# Patient Record
Sex: FEMALE | Race: WHITE | NOT HISPANIC OR LATINO | Employment: OTHER | URBAN - METROPOLITAN AREA
[De-identification: names, ages, dates, MRNs, and addresses within clinical notes are randomized per-mention and may not be internally consistent; named-entity substitution may affect disease eponyms.]

---

## 2017-01-05 RX ORDER — FLUTICASONE PROPIONATE 50 MCG
SPRAY, SUSPENSION (ML) NASAL
Qty: 16 ML | Refills: 0 | Status: SHIPPED | OUTPATIENT
Start: 2017-01-05 | End: 2017-04-07 | Stop reason: SDUPTHER

## 2017-01-15 DIAGNOSIS — I10 HYPERTENSION, BENIGN: ICD-10-CM

## 2017-01-16 RX ORDER — LOSARTAN POTASSIUM 100 MG/1
TABLET ORAL
Qty: 90 TABLET | Refills: 1 | Status: SHIPPED | OUTPATIENT
Start: 2017-01-16 | End: 2017-08-02 | Stop reason: SDUPTHER

## 2017-01-26 DIAGNOSIS — E03.9 HYPOTHYROIDISM (ACQUIRED): ICD-10-CM

## 2017-01-26 RX ORDER — LEVOTHYROXINE SODIUM 0.12 MG/1
TABLET ORAL
Qty: 90 TABLET | Refills: 0 | Status: SHIPPED | OUTPATIENT
Start: 2017-01-26 | End: 2017-05-16 | Stop reason: SDUPTHER

## 2017-02-14 DIAGNOSIS — I10 ESSENTIAL HYPERTENSION: ICD-10-CM

## 2017-02-14 RX ORDER — ATENOLOL AND CHLORTHALIDONE TABLET 50; 25 MG/1; MG/1
TABLET ORAL
Qty: 90 TABLET | Refills: 1 | Status: SHIPPED | OUTPATIENT
Start: 2017-02-14 | End: 2017-08-30 | Stop reason: SDUPTHER

## 2017-04-10 RX ORDER — FLUTICASONE PROPIONATE 50 MCG
SPRAY, SUSPENSION (ML) NASAL
Qty: 16 ML | Refills: 2 | Status: SHIPPED | OUTPATIENT
Start: 2017-04-10 | End: 2018-11-01 | Stop reason: SDUPTHER

## 2017-05-02 ENCOUNTER — OFFICE VISIT (OUTPATIENT)
Dept: INTERNAL MEDICINE | Facility: CLINIC | Age: 82
End: 2017-05-02

## 2017-05-02 VITALS
WEIGHT: 149.4 LBS | SYSTOLIC BLOOD PRESSURE: 142 MMHG | BODY MASS INDEX: 27.49 KG/M2 | HEIGHT: 62 IN | DIASTOLIC BLOOD PRESSURE: 84 MMHG

## 2017-05-02 DIAGNOSIS — I10 ESSENTIAL HYPERTENSION: Primary | ICD-10-CM

## 2017-05-02 DIAGNOSIS — E78.49 OTHER HYPERLIPIDEMIA: ICD-10-CM

## 2017-05-02 DIAGNOSIS — E03.9 ACQUIRED HYPOTHYROIDISM: ICD-10-CM

## 2017-05-02 LAB
ALBUMIN SERPL-MCNC: 4.06 G/DL (ref 3.4–4.6)
ALBUMIN/GLOB SERPL: 1.2 G/DL
ALP SERPL-CCNC: 67 U/L (ref 46–116)
ALT SERPL W P-5'-P-CCNC: 29 U/L (ref 14–59)
ANION GAP SERPL CALCULATED.3IONS-SCNC: 10 MMOL/L
AST SERPL-CCNC: 22 U/L (ref 7–37)
BASOPHILS # BLD AUTO: 0.02 10*3/MM3 (ref 0–0.2)
BASOPHILS NFR BLD AUTO: 0.3 % (ref 0–2)
BILIRUB SERPL-MCNC: 0.5 MG/DL (ref 0.2–1)
BUN BLD-MCNC: 22 MG/DL (ref 6–22)
BUN/CREAT SERPL: 32.8 (ref 7–25)
CALCIUM SPEC-SCNC: 9.2 MG/DL (ref 8.6–10.5)
CHLORIDE SERPL-SCNC: 101 MMOL/L (ref 95–107)
CHOLEST SERPL-MCNC: 226 MG/DL (ref 0–200)
CO2 SERPL-SCNC: 31 MMOL/L (ref 23–32)
CREAT BLD-MCNC: 0.67 MG/DL (ref 0.55–1.02)
DEPRECATED RDW RBC AUTO: 42.5 FL (ref 37–54)
EOSINOPHIL # BLD AUTO: 0.2 10*3/MM3 (ref 0–0.7)
EOSINOPHIL NFR BLD AUTO: 2.9 % (ref 0–5)
ERYTHROCYTE [DISTWIDTH] IN BLOOD BY AUTOMATED COUNT: 12.4 % (ref 11.5–15)
GFR SERPL CREATININE-BSD FRML MDRD: 84 ML/MIN/1.73
GLOBULIN UR ELPH-MCNC: 3.4 GM/DL
GLUCOSE BLD-MCNC: 100 MG/DL (ref 70–100)
HCT VFR BLD AUTO: 40.9 % (ref 34.1–44.9)
HDLC SERPL-MCNC: 69 MG/DL (ref 40–81)
HGB BLD-MCNC: 13.1 G/DL (ref 11.2–15.7)
LDLC SERPL CALC-MCNC: 136 MG/DL (ref 0–100)
LDLC/HDLC SERPL: 1.97 {RATIO}
LYMPHOCYTES # BLD AUTO: 1.56 10*3/MM3 (ref 0.8–7)
LYMPHOCYTES NFR BLD AUTO: 22.7 % (ref 10–60)
MCH RBC QN AUTO: 30.5 PG (ref 26–34)
MCHC RBC AUTO-ENTMCNC: 32 G/DL (ref 31–37)
MCV RBC AUTO: 95.1 FL (ref 80–100)
MONOCYTES # BLD AUTO: 0.75 10*3/MM3 (ref 0–1)
MONOCYTES NFR BLD AUTO: 10.9 % (ref 0–13)
NEUTROPHILS # BLD AUTO: 4.34 10*3/MM3 (ref 1–11)
NEUTROPHILS NFR BLD AUTO: 63.2 % (ref 30–85)
PLATELET # BLD AUTO: 256 10*3/MM3 (ref 150–450)
PMV BLD AUTO: 9.8 FL (ref 6–12)
POTASSIUM BLD-SCNC: 4.1 MMOL/L (ref 3.3–5.3)
PROT SERPL-MCNC: 7.5 G/DL (ref 6.3–8.4)
RBC # BLD AUTO: 4.3 10*6/MM3 (ref 3.93–5.22)
SODIUM BLD-SCNC: 142 MMOL/L (ref 136–145)
TRIGL SERPL-MCNC: 107 MG/DL (ref 0–150)
TSH SERPL-ACNC: 8.22 MIU/ML (ref 0.27–4.2)
VLDLC SERPL-MCNC: 21.4 MG/DL
WBC NRBC COR # BLD: 6.87 10*3/MM3 (ref 5–10)

## 2017-05-02 PROCEDURE — 99213 OFFICE O/P EST LOW 20 MIN: CPT | Performed by: INTERNAL MEDICINE

## 2017-05-02 PROCEDURE — 36415 COLL VENOUS BLD VENIPUNCTURE: CPT | Performed by: INTERNAL MEDICINE

## 2017-05-02 PROCEDURE — 85025 COMPLETE CBC W/AUTO DIFF WBC: CPT | Performed by: INTERNAL MEDICINE

## 2017-05-02 PROCEDURE — 80053 COMPREHEN METABOLIC PANEL: CPT | Performed by: INTERNAL MEDICINE

## 2017-05-02 PROCEDURE — 80061 LIPID PANEL: CPT | Performed by: INTERNAL MEDICINE

## 2017-05-16 DIAGNOSIS — E03.9 HYPOTHYROIDISM (ACQUIRED): ICD-10-CM

## 2017-05-17 RX ORDER — LEVOTHYROXINE SODIUM 0.12 MG/1
TABLET ORAL
Qty: 90 TABLET | Refills: 1 | Status: SHIPPED | OUTPATIENT
Start: 2017-05-17 | End: 2017-11-24 | Stop reason: SDUPTHER

## 2017-05-26 RX ORDER — AMLODIPINE BESYLATE 5 MG/1
TABLET ORAL
Qty: 90 TABLET | Refills: 0 | Status: SHIPPED | OUTPATIENT
Start: 2017-05-26 | End: 2017-08-30 | Stop reason: SDUPTHER

## 2017-06-16 ENCOUNTER — TELEPHONE (OUTPATIENT)
Dept: INTERNAL MEDICINE | Facility: CLINIC | Age: 82
End: 2017-06-16

## 2017-06-16 NOTE — TELEPHONE ENCOUNTER
----- Message from Emely Roman MA sent at 6/16/2017  2:32 PM EDT -----  Pt calling today and would like to have clarification in Levothyroxine directions     Pt#[phone]

## 2017-08-02 DIAGNOSIS — I10 HYPERTENSION, BENIGN: ICD-10-CM

## 2017-08-02 RX ORDER — LOSARTAN POTASSIUM 100 MG/1
TABLET ORAL
Qty: 90 TABLET | Refills: 1 | Status: SHIPPED | OUTPATIENT
Start: 2017-08-02 | End: 2018-02-19 | Stop reason: SDUPTHER

## 2017-08-30 DIAGNOSIS — I10 ESSENTIAL HYPERTENSION: ICD-10-CM

## 2017-08-30 RX ORDER — ATENOLOL AND CHLORTHALIDONE TABLET 50; 25 MG/1; MG/1
TABLET ORAL
Qty: 90 TABLET | Refills: 1 | Status: SHIPPED | OUTPATIENT
Start: 2017-08-30 | End: 2018-03-19 | Stop reason: SDUPTHER

## 2017-08-30 RX ORDER — AMLODIPINE BESYLATE 5 MG/1
TABLET ORAL
Qty: 90 TABLET | Refills: 1 | Status: SHIPPED | OUTPATIENT
Start: 2017-08-30 | End: 2018-03-19 | Stop reason: SDUPTHER

## 2017-11-08 ENCOUNTER — OFFICE VISIT (OUTPATIENT)
Dept: INTERNAL MEDICINE | Facility: CLINIC | Age: 82
End: 2017-11-08

## 2017-11-08 VITALS
HEIGHT: 62 IN | WEIGHT: 141.8 LBS | RESPIRATION RATE: 16 BRPM | SYSTOLIC BLOOD PRESSURE: 124 MMHG | TEMPERATURE: 97.3 F | BODY MASS INDEX: 26.09 KG/M2 | DIASTOLIC BLOOD PRESSURE: 80 MMHG | HEART RATE: 62 BPM | OXYGEN SATURATION: 97 %

## 2017-11-08 DIAGNOSIS — I10 ESSENTIAL HYPERTENSION: Primary | ICD-10-CM

## 2017-11-08 DIAGNOSIS — Z12.31 ENCOUNTER FOR SCREENING MAMMOGRAM FOR BREAST CANCER: ICD-10-CM

## 2017-11-08 DIAGNOSIS — J30.2 SEASONAL ALLERGIC RHINITIS, UNSPECIFIED CHRONICITY, UNSPECIFIED TRIGGER: ICD-10-CM

## 2017-11-08 DIAGNOSIS — E55.9 VITAMIN D DEFICIENCY: ICD-10-CM

## 2017-11-08 DIAGNOSIS — Z78.0 MENOPAUSE: ICD-10-CM

## 2017-11-08 DIAGNOSIS — Z86.010 HISTORY OF COLONIC POLYPS: ICD-10-CM

## 2017-11-08 DIAGNOSIS — Z00.00 MEDICARE ANNUAL WELLNESS VISIT, SUBSEQUENT: ICD-10-CM

## 2017-11-08 DIAGNOSIS — Z23 NEED FOR VACCINATION: ICD-10-CM

## 2017-11-08 DIAGNOSIS — E03.9 ACQUIRED HYPOTHYROIDISM: ICD-10-CM

## 2017-11-08 DIAGNOSIS — E78.49 OTHER HYPERLIPIDEMIA: ICD-10-CM

## 2017-11-08 LAB
ALBUMIN SERPL-MCNC: 4.6 G/DL (ref 3.5–5.2)
ALBUMIN/GLOB SERPL: 1.6 G/DL
ALP SERPL-CCNC: 65 U/L (ref 39–117)
ALT SERPL-CCNC: 16 U/L (ref 1–33)
AST SERPL-CCNC: 19 U/L (ref 1–32)
BILIRUB SERPL-MCNC: 0.5 MG/DL (ref 0.1–1.2)
BUN SERPL-MCNC: 17 MG/DL (ref 8–23)
BUN/CREAT SERPL: 25.8 (ref 7–25)
CALCIUM SERPL-MCNC: 10.3 MG/DL (ref 8.6–10.5)
CHLORIDE SERPL-SCNC: 99 MMOL/L (ref 98–107)
CHOLEST SERPL-MCNC: 205 MG/DL (ref 0–200)
CO2 SERPL-SCNC: 32.7 MMOL/L (ref 22–29)
CREAT SERPL-MCNC: 0.66 MG/DL (ref 0.57–1)
GLOBULIN SER CALC-MCNC: 2.9 GM/DL
GLUCOSE SERPL-MCNC: 108 MG/DL (ref 65–99)
HDLC SERPL-MCNC: 53 MG/DL (ref 40–60)
LDLC SERPL CALC-MCNC: 114 MG/DL (ref 0–100)
POTASSIUM SERPL-SCNC: 3.8 MMOL/L (ref 3.5–5.2)
PROT SERPL-MCNC: 7.5 G/DL (ref 6–8.5)
SODIUM SERPL-SCNC: 144 MMOL/L (ref 136–145)
TRIGL SERPL-MCNC: 189 MG/DL (ref 0–150)
TSH SERPL-ACNC: 0.62 MIU/ML (ref 0.27–4.2)
VLDLC SERPL-MCNC: 37.8 MG/DL (ref 5–40)

## 2017-11-08 PROCEDURE — 90670 PCV13 VACCINE IM: CPT | Performed by: INTERNAL MEDICINE

## 2017-11-08 PROCEDURE — 90662 IIV NO PRSV INCREASED AG IM: CPT | Performed by: INTERNAL MEDICINE

## 2017-11-08 PROCEDURE — 99213 OFFICE O/P EST LOW 20 MIN: CPT | Performed by: INTERNAL MEDICINE

## 2017-11-08 PROCEDURE — 90472 IMMUNIZATION ADMIN EACH ADD: CPT | Performed by: INTERNAL MEDICINE

## 2017-11-08 PROCEDURE — 90471 IMMUNIZATION ADMIN: CPT | Performed by: INTERNAL MEDICINE

## 2017-11-08 PROCEDURE — G0439 PPPS, SUBSEQ VISIT: HCPCS | Performed by: INTERNAL MEDICINE

## 2017-11-08 NOTE — PROGRESS NOTES
"Alanis eSverino is a 82 y.o. female here for   Chief Complaint   Patient presents with   • Subsequent Wellness   • Hyperlipidemia   • Hypertension   • Hypothyroidism   .    Vitals:    11/08/17 0810   BP: 124/80   BP Location: Left arm   Patient Position: Sitting   Cuff Size: Adult   Pulse: 62   Resp: 16   Temp: 97.3 °F (36.3 °C)   TempSrc: Temporal Artery    SpO2: 97%   Weight: 141 lb 12.8 oz (64.3 kg)   Height: 61.5\" (156.2 cm)       Body mass index is 26.36 kg/(m^2).    Hyperlipidemia   This is a chronic problem. The current episode started more than 1 year ago. The problem is controlled. Recent lipid tests were reviewed and are normal. Exacerbating diseases include hypothyroidism. She has no history of chronic renal disease, diabetes or liver disease. Pertinent negatives include no chest pain or shortness of breath.   Hypertension   This is a chronic problem. The current episode started more than 1 year ago. The problem is unchanged. The problem is controlled. Pertinent negatives include no chest pain, palpitations or shortness of breath. There is no history of chronic renal disease.   Hypothyroidism   This is a chronic problem. The current episode started more than 1 year ago. The problem occurs constantly. The problem has been unchanged. Pertinent negatives include no chest pain, chills, coughing, fatigue or fever.        The following portions of the patient's history were reviewed and updated as appropriate: allergies, current medications, past social history and problem list.    Review of Systems   Constitutional: Negative for chills, fatigue and fever.   Respiratory: Negative for cough, shortness of breath and wheezing.    Cardiovascular: Negative for chest pain, palpitations and leg swelling.   Psychiatric/Behavioral: Negative for dysphoric mood and sleep disturbance. The patient is not nervous/anxious.        Objective   Physical Exam   Constitutional: She appears well-developed and " well-nourished. No distress.   Cardiovascular: Normal rate, regular rhythm and normal heart sounds.    Pulmonary/Chest: No respiratory distress. She has no wheezes. She has no rales. She exhibits no tenderness.   Musculoskeletal: She exhibits no edema.   Psychiatric: She has a normal mood and affect. Her behavior is normal.   Nursing note and vitals reviewed.      Assessment/Plan   Diagnoses and all orders for this visit:    Essential hypertension  Comments:  controlled - call if bp over 140/90  Orders:  -     Comprehensive Metabolic Panel; Future  -     Lipid Panel; Future  -     TSH; Future  -     Urinalysis With / Microscopic If Indicated - Urine, Clean Catch; Future  -     Comprehensive Metabolic Panel  -     Lipid Panel  -     TSH  -     Urinalysis With / Microscopic If Indicated - Urine, Clean Catch    Acquired hypothyroidism  Comments:  she is taking synthroid 125 mcg daily - need rechk  Orders:  -     TSH; Future  -     TSH    Other hyperlipidemia  Comments:  need diet & exercise  Orders:  -     Comprehensive Metabolic Panel; Future  -     Lipid Panel; Future  -     Comprehensive Metabolic Panel  -     Lipid Panel    Vitamin D deficiency  Comments:  D=28 - need 2,000 units daily    Medicare annual wellness visit, subsequent    Need for vaccination  -     Flu Vaccine High Dose PF 65YR+  -     Discontinue: pneumococcal conj. 13-valent (PREVNAR-13) vaccine 0.5 mL; Inject 0.5 mL into the shoulder, thigh, or buttocks 1 (One) Time.  -     Pneumococcal Conjugate Vaccine 13-Valent All    Encounter for screening mammogram for breast cancer  -     Mammo Screening Bilateral With CAD; Future    Menopause  -     DEXA Bone Density Axial; Future    History of colonic polyps  Comments:  hyperplastic 10/2012    Seasonal allergic rhinitis, unspecified chronicity, unspecified trigger  Comments:  call if incr sx       Wellness today.     Need daily strengthening & balance exercises (shown today).   Need screening for AAA &  carotid disease.  Information given today.      I recommend zostavax - she will ask cost at pharmacy.

## 2017-11-08 NOTE — PATIENT INSTRUCTIONS
Medicare Wellness  Personal Prevention Plan of Service     Date of Office Visit:  2017  Encounter Provider:  Lluvia Liz MD  Place of Service:  [facility] INTERNAL MEDICINE  Patient Name: Kajal Severino  :  1935    As part of the Medicare Wellness portion of your visit today, we are providing you with this personalized preventive plan of services (PPPS). This plan is based upon recommendations of the [place] Preventive Services Task Force (USPSTF) and the Advisory Committee on Immunization Practices (ACIP).    This lists the preventive care services that should be considered, and provides dates of when you are due. Items listed as completed are up-to-date and do not require any further intervention.    Health Maintenance   Topic Date Due   • PNEUMOCOCCAL VACCINES (65+ LOW/MEDIUM RISK) (2 of 2 - PCV13) 2014   • MEDICARE ANNUAL WELLNESS  2016   • MAMMOGRAM  2016   • DXA SCAN  2016   • INFLUENZA VACCINE  2017   • LIPID PANEL  2018   • TDAP/TD VACCINES (2 - Td) 2023   • ZOSTER VACCINE  Completed       Orders Placed This Encounter   Procedures   • Comprehensive Metabolic Panel     Standing Status:   Future     Standing Expiration Date:   2018   • Lipid Panel     Standing Status:   Future     Standing Expiration Date:   2018   • TSH     Standing Status:   Future     Standing Expiration Date:   2018   • Urinalysis With / Microscopic If Indicated - Urine, Clean Catch     Standing Status:   Future     Standing Expiration Date:   2018       No Follow-up on file.

## 2017-11-08 NOTE — PROGRESS NOTES
QUICK REFERENCE INFORMATION:  The ABCs of the Annual Wellness Visit    Subsequent Medicare Wellness Visit    HEALTH RISK ASSESSMENT    1935    Recent Hospitalizations:  No hospitalization(s) within the last year..        Current Medical Providers:  Patient Care Team:  Lluvia Liz MD as PCP - General  Lluvia Liz MD as PCP - Family Medicine        Smoking Status:  History   Smoking Status   • Never Smoker   Smokeless Tobacco   • Never Used       Alcohol Consumption:  History   Alcohol Use No       Depression Screen:   PHQ-2/PHQ-9 Depression Screening 11/8/2017   Little interest or pleasure in doing things 0   Feeling down, depressed, or hopeless 0   Trouble falling or staying asleep, or sleeping too much 0   Feeling tired or having little energy 0   Poor appetite or overeating 0   Feeling bad about yourself - or that you are a failure or have let yourself or your family down 0   Trouble concentrating on things, such as reading the newspaper or watching television 0   Moving or speaking so slowly that other people could have noticed. Or the opposite - being so fidgety or restless that you have been moving around a lot more than usual 0   Thoughts that you would be better off dead, or of hurting yourself in some way 0   Total Score 0       Health Habits and Functional and Cognitive Screening:  Functional & Cognitive Status 11/8/2017   Do you have difficulty preparing food and eating? No   Do you have difficulty bathing yourself, getting dressed or grooming yourself? No   Do you have difficulty using the toilet? No   Do you have difficulty moving around from place to place? No   Do you have trouble with steps or getting out of a bed or a chair? No   In the past year have you fallen or experienced a near fall? No   Do you need help using the phone?  No   Are you deaf or do you have serious difficulty hearing?  No   Do you need help with transportation? Yes   Do you need help shopping? No   Do you need  help preparing meals?  No   Do you need help with housework?  No   Do you need help with laundry? No   Do you need help taking your medications? No   Do you need help managing money? No   Do you have difficulty concentrating, remembering or making decisions? No           Does the patient have evidence of cognitive impairment? No    Aspirin use counseling: Does not need AS but is currently taking (discussed benefits vs risks and patient elects to stay on ASA)      Recent Lab Results:  CMP:  Lab Results   Component Value Date    GLU 99 05/20/2016    BUN 22 05/02/2017    CREATININE 0.67 05/02/2017    EGFRIFNONA 84 05/02/2017    EGFRIFAFRI 121 05/20/2016    BCR 32.8 (H) 05/02/2017     05/02/2017    K 4.1 05/02/2017    CO2 31.0 05/02/2017    CALCIUM 9.2 05/02/2017    PROTENTOTREF 6.9 05/20/2016    ALBUMIN 4.06 05/02/2017    LABGLOBREF 2.5 05/20/2016    LABIL2 1.2 05/02/2017    BILITOT 0.5 05/02/2017    ALKPHOS 67 05/02/2017    AST 22 05/02/2017    ALT 29 05/02/2017     Lipid Panel:  Lab Results   Component Value Date    CHOL 226 (H) 05/02/2017    TRIG 107 05/02/2017    HDL 69 05/02/2017    VLDL 21.4 05/02/2017    LDLCALC 136 (H) 05/02/2017    LDLHDL 1.97 05/02/2017     HbA1c:       Visual Acuity:  No exam data present    Age-appropriate Screening Schedule:  Refer to the list below for future screening recommendations based on patient's age, sex and/or medical conditions. Orders for these recommended tests are listed in the plan section. The patient has been provided with a written plan.    Health Maintenance   Topic Date Due   • MAMMOGRAM  03/09/2016   • DXA SCAN  08/03/2016   • LIPID PANEL  11/08/2018   • TDAP/TD VACCINES (2 - Td) 03/18/2023   • INFLUENZA VACCINE  Completed   • PNEUMOCOCCAL VACCINES (65+ LOW/MEDIUM RISK)  Completed   • ZOSTER VACCINE  Completed        Subjective   History of Present Illness    Kajal Severino is a 82 y.o. female who presents for an Subsequent Wellness Visit.    The following portions  of the patient's history were reviewed and updated as appropriate: allergies, current medications, past family history, past medical history, past social history, past surgical history and problem list.    Outpatient Medications Prior to Visit   Medication Sig Dispense Refill   • amLODIPine (NORVASC) 5 MG tablet TAKE 1 TABLET BY MOUTH DAILY 90 tablet 1   • atenolol-chlorthalidone (TENORETIC) 50-25 MG per tablet TAKE 1 TABLET BY MOUTH DAILY 90 tablet 1   • fluticasone (FLONASE) 50 MCG/ACT nasal spray USE 2 SPRAYS IN EACH NOSTRIL EVERY DAY 16 mL 2   • levothyroxine (SYNTHROID, LEVOTHROID) 125 MCG tablet TAKE 1 TABLET BY MOUTH EVERY DAY EXCEPT SUNDAY 90 tablet 1   • losartan (COZAAR) 100 MG tablet TAKE 1 TABLET BY MOUTH DAILY 90 tablet 1   • montelukast (SINGULAIR) 10 MG tablet TAKE 1 TABLET BY MOUTH AT BEDTIME 30 tablet 3   • potassium chloride (K-DUR,KLOR-CON) 10 MEQ CR tablet Take 1 tablet by mouth Daily. 90 tablet 1   • levothyroxine (SYNTHROID, LEVOTHROID) 125 MCG tablet TAKE 1 TABLET BY MOUTH EVERY DAY EXCEPT SUNDAY (Patient taking differently: TAKE 1 TABLET BY MOUTH EVERY DAY) 90 tablet 0     No facility-administered medications prior to visit.        Patient Active Problem List   Diagnosis   • Hypertension   • Hyperlipidemia   • History of colonic polyps   • Hypothyroidism   • Allergic rhinitis   • Back pain, lumbosacral   • Osteoarthritis   • Vitamin D deficiency       Advance Care Planning:  Information given today - she will discuss with 3 daughters & fill out forms.    Identification of Risk Factors:  Risk factors include: increased fall risk.    Review of Systems    Compared to one year ago, the patient feels her physical health is better.  Compared to one year ago, the patient feels her mental health is better.    Objective     Physical Exam    Vitals:    11/08/17 0810   BP: 124/80   BP Location: Left arm   Patient Position: Sitting   Cuff Size: Adult   Pulse: 62   Resp: 16   Temp: 97.3 °F (36.3 °C)  "  TempSrc: Temporal Artery    SpO2: 97%   Weight: 141 lb 12.8 oz (64.3 kg)   Height: 61.5\" (156.2 cm)  Comment: Updated Height   PainSc: 0-No pain       Body mass index is 26.36 kg/(m^2).  Discussed the patient's BMI with her. The BMI is above average; BMI management plan is completed.    Assessment/Plan   Patient Self-Management and Personalized Health Advice  The patient has been provided with information about: diet, exercise, weight management, prevention of cardiac or vascular disease, the relationship between weight and GERD, fall prevention and designing advance directives and preventive services including:   · Advance directive, Bone densitometry screening, Diabetes screening, see lab orders, Exercise counseling provided, Fall Risk assessment done, Fall Risk plan of care done, Glaucoma screening recommended, Influenza vaccine, Nutrition counseling provided, Pneumococcal vaccine , Screening for AAA, referral for ultrasound placed, Screening mammography, referral placed, Zostavax vaccine (Herpes Zoster).    Visit Diagnoses:    ICD-10-CM ICD-9-CM   1. Need for vaccination Z23 V05.9   2. Acquired hypothyroidism E03.9 244.9   3. Essential hypertension I10 401.9   4. Other hyperlipidemia E78.4 272.4   5. Vitamin D deficiency E55.9 268.9   6. Medicare annual wellness visit, subsequent Z00.00 V70.0   7. Encounter for screening mammogram for breast cancer Z12.31 V76.12   8. Menopause Z78.0 627.2   9. History of colonic polyps Z86.010 V12.72   10. Seasonal allergic rhinitis, unspecified chronicity, unspecified trigger J30.2 477.9       Orders Placed This Encounter   Procedures   • DEXA Bone Density Axial     Standing Status:   Future     Standing Expiration Date:   11/8/2018     Order Specific Question:   Reason for Exam:     Answer:   menopause   • Mammo Screening Bilateral With CAD     Standing Status:   Future     Standing Expiration Date:   11/8/2018     Order Specific Question:   Reason for Exam:     Answer:   " screen   • Flu Vaccine High Dose PF 65YR+   • Pneumococcal Conjugate Vaccine 13-Valent All   • Comprehensive Metabolic Panel     Standing Status:   Future     Number of Occurrences:   1     Standing Expiration Date:   11/8/2018   • Lipid Panel     Standing Status:   Future     Number of Occurrences:   1     Standing Expiration Date:   11/8/2018   • TSH     Standing Status:   Future     Number of Occurrences:   1     Standing Expiration Date:   11/8/2018   • Urinalysis With / Microscopic If Indicated - Urine, Clean Catch     Standing Status:   Future     Number of Occurrences:   1     Standing Expiration Date:   11/8/2018       Outpatient Encounter Prescriptions as of 11/8/2017   Medication Sig Dispense Refill   • amLODIPine (NORVASC) 5 MG tablet TAKE 1 TABLET BY MOUTH DAILY 90 tablet 1   • atenolol-chlorthalidone (TENORETIC) 50-25 MG per tablet TAKE 1 TABLET BY MOUTH DAILY 90 tablet 1   • fluticasone (FLONASE) 50 MCG/ACT nasal spray USE 2 SPRAYS IN EACH NOSTRIL EVERY DAY 16 mL 2   • levothyroxine (SYNTHROID, LEVOTHROID) 125 MCG tablet TAKE 1 TABLET BY MOUTH EVERY DAY EXCEPT SUNDAY 90 tablet 1   • losartan (COZAAR) 100 MG tablet TAKE 1 TABLET BY MOUTH DAILY 90 tablet 1   • montelukast (SINGULAIR) 10 MG tablet TAKE 1 TABLET BY MOUTH AT BEDTIME 30 tablet 3   • potassium chloride (K-DUR,KLOR-CON) 10 MEQ CR tablet Take 1 tablet by mouth Daily. 90 tablet 1   • [DISCONTINUED] levothyroxine (SYNTHROID, LEVOTHROID) 125 MCG tablet TAKE 1 TABLET BY MOUTH EVERY DAY EXCEPT SUNDAY (Patient taking differently: TAKE 1 TABLET BY MOUTH EVERY DAY) 90 tablet 0     Facility-Administered Encounter Medications as of 11/8/2017   Medication Dose Route Frequency Provider Last Rate Last Dose   • [DISCONTINUED] pneumococcal conj. 13-valent (PREVNAR-13) vaccine 0.5 mL  0.5 mL Intramuscular Once Lluvia Liz MD           Reviewed use of high risk medication in the elderly: not applicable  Reviewed for potential of harmful drug interactions  in the elderly: not applicable    Follow Up:  Return in about 6 months (around 5/8/2018) for Recheck.     An After Visit Summary and PPPS with all of these plans were given to the patient.

## 2017-11-24 DIAGNOSIS — E03.9 HYPOTHYROIDISM (ACQUIRED): ICD-10-CM

## 2017-11-27 RX ORDER — LEVOTHYROXINE SODIUM 0.12 MG/1
TABLET ORAL
Qty: 90 TABLET | Refills: 3 | Status: SHIPPED | OUTPATIENT
Start: 2017-11-27 | End: 2018-05-09 | Stop reason: DRUGHIGH

## 2018-02-19 DIAGNOSIS — I10 HYPERTENSION, BENIGN: ICD-10-CM

## 2018-02-19 RX ORDER — LOSARTAN POTASSIUM 100 MG/1
TABLET ORAL
Qty: 90 TABLET | Refills: 1 | Status: SHIPPED | OUTPATIENT
Start: 2018-02-19 | End: 2018-09-10 | Stop reason: SDUPTHER

## 2018-03-19 DIAGNOSIS — I10 ESSENTIAL HYPERTENSION: ICD-10-CM

## 2018-03-19 RX ORDER — AMLODIPINE BESYLATE 5 MG/1
TABLET ORAL
Qty: 90 TABLET | Refills: 1 | Status: SHIPPED | OUTPATIENT
Start: 2018-03-19 | End: 2018-11-20 | Stop reason: SDUPTHER

## 2018-03-19 RX ORDER — ATENOLOL AND CHLORTHALIDONE TABLET 50; 25 MG/1; MG/1
TABLET ORAL
Qty: 90 TABLET | Refills: 1 | Status: SHIPPED | OUTPATIENT
Start: 2018-03-19 | End: 2018-10-06 | Stop reason: SDUPTHER

## 2018-05-09 ENCOUNTER — OFFICE VISIT (OUTPATIENT)
Dept: INTERNAL MEDICINE | Facility: CLINIC | Age: 83
End: 2018-05-09

## 2018-05-09 VITALS
HEIGHT: 62 IN | OXYGEN SATURATION: 98 % | HEART RATE: 60 BPM | BODY MASS INDEX: 25.21 KG/M2 | DIASTOLIC BLOOD PRESSURE: 80 MMHG | SYSTOLIC BLOOD PRESSURE: 134 MMHG | WEIGHT: 137 LBS

## 2018-05-09 DIAGNOSIS — E55.9 VITAMIN D DEFICIENCY: ICD-10-CM

## 2018-05-09 DIAGNOSIS — E03.9 ACQUIRED HYPOTHYROIDISM: ICD-10-CM

## 2018-05-09 DIAGNOSIS — I10 ESSENTIAL HYPERTENSION: Primary | ICD-10-CM

## 2018-05-09 DIAGNOSIS — E78.49 OTHER HYPERLIPIDEMIA: ICD-10-CM

## 2018-05-09 LAB
ALBUMIN SERPL-MCNC: 4.6 G/DL (ref 3.5–5.2)
ALBUMIN/GLOB SERPL: 1.8 G/DL
ALP SERPL-CCNC: 54 U/L (ref 39–117)
ALT SERPL-CCNC: 22 U/L (ref 1–33)
AST SERPL-CCNC: 27 U/L (ref 1–32)
BASOPHILS # BLD AUTO: 0.03 10*3/MM3 (ref 0–0.2)
BASOPHILS NFR BLD AUTO: 0.6 % (ref 0–2)
BILIRUB SERPL-MCNC: 0.4 MG/DL (ref 0.1–1.2)
BUN SERPL-MCNC: 19 MG/DL (ref 8–23)
BUN/CREAT SERPL: 28.8 (ref 7–25)
CALCIUM SERPL-MCNC: 10.1 MG/DL (ref 8.6–10.5)
CHLORIDE SERPL-SCNC: 100 MMOL/L (ref 98–107)
CHOLEST SERPL-MCNC: 202 MG/DL (ref 0–200)
CO2 SERPL-SCNC: 29.5 MMOL/L (ref 22–29)
CREAT SERPL-MCNC: 0.66 MG/DL (ref 0.57–1)
DEPRECATED RDW RBC AUTO: 44.2 FL (ref 37–54)
EOSINOPHIL # BLD AUTO: 0.3 10*3/MM3 (ref 0–0.7)
EOSINOPHIL NFR BLD AUTO: 5.9 % (ref 0–5)
ERYTHROCYTE [DISTWIDTH] IN BLOOD BY AUTOMATED COUNT: 12.8 % (ref 11.5–15)
GLOBULIN SER CALC-MCNC: 2.6 GM/DL
GLUCOSE SERPL-MCNC: 97 MG/DL (ref 65–99)
HCT VFR BLD AUTO: 40.4 % (ref 34.1–44.9)
HDLC SERPL-MCNC: 67 MG/DL (ref 40–60)
HGB BLD-MCNC: 13.2 G/DL (ref 11.2–15.7)
LDLC SERPL CALC-MCNC: 107 MG/DL (ref 0–100)
LYMPHOCYTES # BLD AUTO: 1.86 10*3/MM3 (ref 0.8–7)
LYMPHOCYTES NFR BLD AUTO: 36.7 % (ref 10–60)
MCH RBC QN AUTO: 31.6 PG (ref 26–34)
MCHC RBC AUTO-ENTMCNC: 32.7 G/DL (ref 31–37)
MCV RBC AUTO: 96.7 FL (ref 80–100)
MONOCYTES # BLD AUTO: 0.49 10*3/MM3 (ref 0–1)
MONOCYTES NFR BLD AUTO: 9.7 % (ref 0–13)
NEUTROPHILS # BLD AUTO: 2.39 10*3/MM3 (ref 1–11)
NEUTROPHILS NFR BLD AUTO: 47.1 % (ref 30–85)
PLATELET # BLD AUTO: 224 10*3/MM3 (ref 150–450)
PMV BLD AUTO: 9.7 FL (ref 6–12)
POTASSIUM SERPL-SCNC: 3.9 MMOL/L (ref 3.5–5.2)
PROT SERPL-MCNC: 7.2 G/DL (ref 6–8.5)
RBC # BLD AUTO: 4.18 10*6/MM3 (ref 3.93–5.22)
SODIUM SERPL-SCNC: 142 MMOL/L (ref 136–145)
TRIGL SERPL-MCNC: 140 MG/DL (ref 0–150)
TSH SERPL-ACNC: 1.94 MIU/ML (ref 0.27–4.2)
VLDLC SERPL-MCNC: 28 MG/DL (ref 5–40)
WBC NRBC COR # BLD: 5.07 10*3/MM3 (ref 5–10)

## 2018-05-09 PROCEDURE — 36415 COLL VENOUS BLD VENIPUNCTURE: CPT | Performed by: INTERNAL MEDICINE

## 2018-05-09 PROCEDURE — 85025 COMPLETE CBC W/AUTO DIFF WBC: CPT | Performed by: INTERNAL MEDICINE

## 2018-05-09 PROCEDURE — 99213 OFFICE O/P EST LOW 20 MIN: CPT | Performed by: INTERNAL MEDICINE

## 2018-05-09 RX ORDER — LEVOTHYROXINE SODIUM 0.12 MG/1
125 TABLET ORAL DAILY
COMMUNITY
End: 2019-07-16 | Stop reason: DRUGHIGH

## 2018-05-09 NOTE — PROGRESS NOTES
"Alanis Severino is a 83 y.o. female here for   Chief Complaint   Patient presents with   • Hypertension   • Hyperlipidemia   • Hypothyroidism   .    Vitals:    05/09/18 0754   BP: 134/80   BP Location: Right arm   Patient Position: Sitting   Cuff Size: Adult   Pulse: 60   SpO2: 98%   Weight: 62.1 kg (137 lb)   Height: 156.2 cm (61.5\")       Body mass index is 25.47 kg/m².    Hypertension   This is a chronic problem. The current episode started more than 1 year ago. The problem is unchanged. The problem is controlled. Pertinent negatives include no chest pain, palpitations or shortness of breath.   Hyperlipidemia   This is a chronic problem. The current episode started more than 1 year ago. The problem is controlled. Recent lipid tests were reviewed and are normal. Exacerbating diseases include hypothyroidism. She has no history of diabetes. Pertinent negatives include no chest pain or shortness of breath.   Hypothyroidism   This is a chronic problem. The current episode started more than 1 year ago. The problem occurs constantly. The problem has been unchanged. Pertinent negatives include no chest pain, chills, coughing, fatigue or fever.        The following portions of the patient's history were reviewed and updated as appropriate: allergies, current medications, past social history and problem list.    Review of Systems   Constitutional: Negative for chills, fatigue and fever.   Respiratory: Negative for cough, shortness of breath and wheezing.    Cardiovascular: Negative for chest pain, palpitations and leg swelling.   Psychiatric/Behavioral: Negative for dysphoric mood and sleep disturbance. The patient is not nervous/anxious.        Objective   Physical Exam   Constitutional: She appears well-developed and well-nourished. No distress.   Cardiovascular: Normal rate, regular rhythm and normal heart sounds.    Pulmonary/Chest: No respiratory distress. She has no wheezes. She has no rales. She exhibits no " tenderness.   Musculoskeletal: She exhibits no edema.   Psychiatric: She has a normal mood and affect. Her behavior is normal.   Nursing note and vitals reviewed.      Assessment/Plan   Diagnoses and all orders for this visit:    Essential hypertension  Comments:  stable - call if bp over 140/90  Orders:  -     Comprehensive Metabolic Panel; Future  -     Lipid Panel; Future  -     CBC Auto Differential; Future    Other hyperlipidemia  Comments:  continue diet/ex  Orders:  -     Comprehensive Metabolic Panel; Future  -     Lipid Panel; Future    Vitamin D deficiency  Comments:  continue 2,000 units daily    Acquired hypothyroidism  Comments:  stable  Orders:  -     TSH Rfx On Abnormal To Free T4; Future    Other orders  -     levothyroxine (SYNTHROID, LEVOTHROID) 125 MCG tablet; Take 125 mcg by mouth Daily.

## 2018-06-14 ENCOUNTER — OFFICE VISIT (OUTPATIENT)
Dept: INTERNAL MEDICINE | Facility: CLINIC | Age: 83
End: 2018-06-14

## 2018-06-14 ENCOUNTER — APPOINTMENT (OUTPATIENT)
Dept: WOMENS IMAGING | Facility: HOSPITAL | Age: 83
End: 2018-06-14

## 2018-06-14 VITALS
BODY MASS INDEX: 26.02 KG/M2 | TEMPERATURE: 98.5 F | HEART RATE: 61 BPM | WEIGHT: 140 LBS | SYSTOLIC BLOOD PRESSURE: 136 MMHG | OXYGEN SATURATION: 95 % | DIASTOLIC BLOOD PRESSURE: 84 MMHG

## 2018-06-14 DIAGNOSIS — S86.891A MEDIAL TIBIAL STRESS SYNDROME, RIGHT, INITIAL ENCOUNTER: ICD-10-CM

## 2018-06-14 DIAGNOSIS — M79.604 RIGHT LEG PAIN: Primary | ICD-10-CM

## 2018-06-14 PROCEDURE — 73590 X-RAY EXAM OF LOWER LEG: CPT | Performed by: RADIOLOGY

## 2018-06-14 PROCEDURE — 99213 OFFICE O/P EST LOW 20 MIN: CPT | Performed by: NURSE PRACTITIONER

## 2018-06-14 PROCEDURE — 73590 X-RAY EXAM OF LOWER LEG: CPT | Performed by: NURSE PRACTITIONER

## 2018-06-14 NOTE — PATIENT INSTRUCTIONS
Shin Splints  Shin splints is a painful condition that is felt on the bone that is located in the front of the lower leg (tibia or shin bone) or in the muscles on either side of the bone. This condition happens when physical activities lead to inflammation of the muscles, tendons, and the thin layer that covers the shin bone. It may result from participating in sports or other demanding exercise.  What are the causes?  This condition may be caused by:  · Overuse of muscles.  · Repetitive activities.  · Flat feet or rigid arches.  Activities that could contribute to shin splints include:  · Having a sudden increase in exercise time.  · Starting a new, demanding activity.  · Running up hills or long distances.  · Playing sports that involve sudden starts and stops.  · Using a poor warm-up.  · Wearing old or worn-out shoes.  What are the signs or symptoms?  Symptoms of this condition include:  · Pain on the front of the lower leg.  · Pain while exercising or at rest.  How is this diagnosed?  This condition may be diagnosed based on a physical exam and the history of your symptoms. Your health care provider may observe you as you walk or run. You may also have X-rays or other imaging tests to rule out other problems that could cause lower leg pain, such as a stress fracture.  How is this treated?  Treatment for this condition may depend on your age, history, health, and how bad the pain is. Most cases of shin splints can be managed by doing one or more of the following:  · Resting.  · Reducing the length and intensity of your exercise.  · Stopping the activity that causes shin pain.  · Taking medicines to control the inflammation.  · Icing, massaging, stretching, and strengthening the affected area.  · Wearing shoes that have rigid heels, shock absorption, and a good arch support.  Follow these instructions at home:  Injury care   · If directed, apply ice to the injured area:  ¨ Put ice in a plastic bag.  ¨ Place a  towel between your skin and the bag.  ¨ Leave the ice on for 20 minutes, 2-3 times a day.  · Massage, stretch, and strengthen the affected area as directed by your health care provider.  Activity   · Rest as needed. Return to activity gradually as directed by your health care provider.  · Restart your exercise sessions with non-weight-bearing exercises, such as cycling or swimming.  · Stop running if the pain returns.  · Warm up properly before exercising.  · Run on a surface that is level and fairly firm.  · Gradually change the intensity of an exercise.  · If you increase your running distance, add only 5-10% to your distance each week. This means that if you are running 5 miles this week, you should only increase your run by ¼-½ mile for next week.  General instructions   · Take medicines only as directed by your health care provider.  · Wear shoes that have rigid heels, shock absorption, and a good arch support.  · Change your athletic shoes every 6 months, or every 350-450 miles.  Contact a health care provider if:  · Your symptoms continue or they worsen even after treatment.  · The location, intensity, or type of pain changes over time.  · You have swelling in your lower leg that gets worse.  · Your shin becomes red and feels warm.  Get help right away if:  · You have severe pain.  · You have trouble walking.  This information is not intended to replace advice given to you by your health care provider. Make sure you discuss any questions you have with your health care provider.  Document Released: 12/15/2001 Document Revised: 08/12/2017 Document Reviewed: 12/14/2015  [employer] Interactive Patient Education © 2017 [employer]

## 2018-06-14 NOTE — PROGRESS NOTES
Subjective   Kajal Severino is a 83 y.o. female.     She was out in the garden and was using a tool to put flowers in. She is not sure if this aggravated her leg.   .She reports she just started with right leg anterior pain. No calf pain. No history of blood clots.       Leg Pain    The incident occurred more than 1 week ago. There was no injury mechanism. The pain is present in the left leg. The quality of the pain is described as aching. The pain has been fluctuating since onset. Pertinent negatives include no inability to bear weight, numbness or tingling. The symptoms are aggravated by weight bearing. She has tried ice (ibuprofen (400 mg), compression stockings, arthritis cream ) for the symptoms. The treatment provided mild relief.   Leg Swelling   This is a new problem. The current episode started 1 to 4 weeks ago. The problem has been waxing and waning. Associated symptoms include arthralgias (left leg pain with intermittent swelling ). Pertinent negatives include no abdominal pain, chest pain, coughing, fatigue, fever, joint swelling or numbness. She has tried ice for the symptoms.        The following portions of the patient's history were reviewed and updated as appropriate: allergies, current medications, past social history and problem list.    Review of Systems   Constitutional: Negative for activity change, appetite change, fatigue and fever.   Respiratory: Negative for cough, shortness of breath and wheezing.    Cardiovascular: Negative for chest pain, palpitations and leg swelling.        Varicose veins    Gastrointestinal: Negative for abdominal pain.   Musculoskeletal: Positive for arthralgias (left leg pain with intermittent swelling ). Negative for joint swelling.   Neurological: Negative for dizziness, tingling, speech difficulty and numbness.       Objective   Physical Exam   Constitutional: She is oriented to person, place, and time. She appears well-developed and well-nourished.   HENT:   Head:  Normocephalic.   Nose: Nose normal.   Cardiovascular: Regular rhythm and normal heart sounds.  Exam reveals no S3 and no S4.    No murmur heard.  No redness or warmth   Negative homans sign   Bilateral varicose veins   Pulmonary/Chest: Effort normal and breath sounds normal. She has no decreased breath sounds. She has no wheezes. She has no rhonchi. She has no rales.   Musculoskeletal: She exhibits no edema.        Right lower leg: She exhibits tenderness and bony tenderness. She exhibits no swelling.        Left lower leg: She exhibits no tenderness and no bony tenderness.   No redness, warmth noted  Tenderness along lateral anterior lower leg  No swelling noted   Neurological: She is alert and oriented to person, place, and time. Gait normal.   Skin: Skin is warm and dry.   Psychiatric: She has a normal mood and affect.       Assessment/Plan   Kajal was seen today for leg pain and leg swelling.    Diagnoses and all orders for this visit:    Right leg pain  Comments:  increase ibuprofen 600 mg tid, apply ice and elevate, stretches demonstrated  Orders:  -     Cancel: XR Tibia Fibula 2 View Left  -     XR Tibia Fibula 2 View Right    Medial tibial stress syndrome, right, initial encounter      Xray no acute findings. She declines PT. She has been instructed to return sooner if any redness, warmth or streaking noted. She is accompanied by her daughter.

## 2018-06-29 ENCOUNTER — OFFICE VISIT (OUTPATIENT)
Dept: INTERNAL MEDICINE | Facility: CLINIC | Age: 83
End: 2018-06-29

## 2018-06-29 ENCOUNTER — APPOINTMENT (OUTPATIENT)
Dept: WOMENS IMAGING | Facility: HOSPITAL | Age: 83
End: 2018-06-29

## 2018-06-29 VITALS
BODY MASS INDEX: 26.02 KG/M2 | WEIGHT: 140 LBS | TEMPERATURE: 97.5 F | DIASTOLIC BLOOD PRESSURE: 84 MMHG | SYSTOLIC BLOOD PRESSURE: 130 MMHG

## 2018-06-29 DIAGNOSIS — M79.604 RIGHT LEG PAIN: Primary | ICD-10-CM

## 2018-06-29 DIAGNOSIS — M25.571 ACUTE RIGHT ANKLE PAIN: ICD-10-CM

## 2018-06-29 PROCEDURE — 99213 OFFICE O/P EST LOW 20 MIN: CPT | Performed by: NURSE PRACTITIONER

## 2018-06-29 PROCEDURE — 73610 X-RAY EXAM OF ANKLE: CPT | Performed by: NURSE PRACTITIONER

## 2018-06-29 PROCEDURE — 73610 X-RAY EXAM OF ANKLE: CPT | Performed by: RADIOLOGY

## 2018-06-29 RX ORDER — METHYLPREDNISOLONE 4 MG/1
TABLET ORAL
Qty: 21 TABLET | Refills: 0 | Status: SHIPPED | OUTPATIENT
Start: 2018-06-29 | End: 2018-08-08

## 2018-06-29 NOTE — PROGRESS NOTES
Subjective   Kajal Severino is a 83 y.o. female.     She was seen in office on 6/14/2018 and c/o of right leg pain. She had imaging and no acute findings noted. She reports still having dull pain to right leg.Her ankle with intermittent sharp pain. She denies any redness or streaking.       Ankle Pain    The incident occurred more than 1 week ago. The injury mechanism is unknown. The pain is present in the right ankle. The pain is at a severity of 8/10. The pain is mild. The pain has been intermittent since onset. Pertinent negatives include no numbness or tingling. The symptoms are aggravated by weight bearing. She has tried ice (ibuprofen, compression stocking ) for the symptoms. The treatment provided mild relief.        The following portions of the patient's history were reviewed and updated as appropriate: allergies, current medications, past social history and problem list.    Review of Systems   Constitutional: Negative for activity change, appetite change, fatigue and fever.   Respiratory: Negative for cough, shortness of breath and wheezing.    Cardiovascular: Negative for chest pain, palpitations and leg swelling.   Musculoskeletal: Positive for arthralgias (right lower leg/shin ) and joint swelling (mild ankle right ). Negative for back pain.   Neurological: Negative for tingling and numbness.       Objective   Physical Exam   Constitutional: She is oriented to person, place, and time. She appears well-developed and well-nourished.   HENT:   Head: Normocephalic.   Nose: Nose normal.   Cardiovascular: Regular rhythm and normal heart sounds.  Exam reveals no S3 and no S4.    No murmur heard.  Pulmonary/Chest: Effort normal and breath sounds normal. She has no decreased breath sounds. She has no wheezes. She has no rhonchi. She has no rales.   Musculoskeletal: She exhibits no edema.        Right ankle: She exhibits swelling. She exhibits normal range of motion. Tenderness. Lateral malleolus tenderness found. No  medial malleolus tenderness found.        Left ankle: She exhibits normal range of motion and no swelling. No tenderness. No lateral malleolus tenderness found.   Neurological: She is alert and oriented to person, place, and time. Gait normal.   Skin: Skin is warm and dry.   Psychiatric: She has a normal mood and affect.       Assessment/Plan   Kajal was seen today for ankle pain.    Diagnoses and all orders for this visit:    Right leg pain  -     Ambulatory Referral to Physical Therapy Evaluate and treat    Acute right ankle pain  Comments:  continue with ace wrap, and elevation   Orders:  -     XR Ankle 3+ View Right  -     Ambulatory Referral to Physical Therapy Evaluate and treat  -     MethylPREDNISolone (MEDROL) 4 MG tablet; follow package directions    Will send to PT. If symptoms persist will obtain MRI of ankle. Patient instructed to return if any redness, warmth or streaking.  She is accompanied by her daughter.

## 2018-07-02 ENCOUNTER — TELEPHONE (OUTPATIENT)
Dept: INTERNAL MEDICINE | Facility: CLINIC | Age: 83
End: 2018-07-02

## 2018-07-02 NOTE — TELEPHONE ENCOUNTER
I called and notified daughter of xray findings (soft tissue swelling). She reports patient she is feeling better since starting oral steroid. I did remind her of PT evaluation. She will try to convince her mother to due to therapy. I did recommend her to returning sooner if needed, otherwise will reevaluate at next appointment.

## 2018-08-08 ENCOUNTER — APPOINTMENT (OUTPATIENT)
Dept: WOMENS IMAGING | Facility: HOSPITAL | Age: 83
End: 2018-08-08

## 2018-08-08 ENCOUNTER — OFFICE VISIT (OUTPATIENT)
Dept: INTERNAL MEDICINE | Facility: CLINIC | Age: 83
End: 2018-08-08

## 2018-08-08 VITALS
SYSTOLIC BLOOD PRESSURE: 128 MMHG | WEIGHT: 143 LBS | DIASTOLIC BLOOD PRESSURE: 84 MMHG | BODY MASS INDEX: 26.58 KG/M2 | OXYGEN SATURATION: 98 % | HEART RATE: 66 BPM

## 2018-08-08 DIAGNOSIS — M54.30 SCIATIC LEG PAIN: Primary | ICD-10-CM

## 2018-08-08 PROCEDURE — 72110 X-RAY EXAM L-2 SPINE 4/>VWS: CPT | Performed by: NURSE PRACTITIONER

## 2018-08-08 PROCEDURE — 72110 X-RAY EXAM L-2 SPINE 4/>VWS: CPT | Performed by: RADIOLOGY

## 2018-08-08 PROCEDURE — 99213 OFFICE O/P EST LOW 20 MIN: CPT | Performed by: NURSE PRACTITIONER

## 2018-08-08 RX ORDER — GABAPENTIN 100 MG/1
100 CAPSULE ORAL NIGHTLY PRN
Qty: 30 CAPSULE | Refills: 0 | Status: SHIPPED | OUTPATIENT
Start: 2018-08-08 | End: 2018-09-10 | Stop reason: SDUPTHER

## 2018-08-08 NOTE — PROGRESS NOTES
Subjective   Kajal Severino is a 83 y.o. female.     She had imaging of right leg/ankle about one month ago and no acute findings. She is now having right knee pain. She was given oral steroid and had some relief. She was given referral for PT, however she did not go (forgot).       Leg Pain    The incident occurred more than 1 week ago. There was no injury mechanism. The pain is present in the right leg. The pain is at a severity of 1/10 (worst 9/10). The pain has been fluctuating since onset. Pertinent negatives include no numbness or tingling. The symptoms are aggravated by movement (going upstairs, walking ). Treatments tried: ibuprofen         The following portions of the patient's history were reviewed and updated as appropriate: allergies, current medications, past social history and problem list.    Review of Systems   Constitutional: Negative for chills and fever.   Cardiovascular: Negative for chest pain, palpitations and leg swelling.   Musculoskeletal: Positive for arthralgias (right knee pain) and back pain (right lower sciatic). Negative for gait problem, joint swelling, neck pain and neck stiffness.   Neurological: Negative for tingling and numbness.       Objective   Physical Exam   Constitutional: She is oriented to person, place, and time. She appears well-developed and well-nourished.   HENT:   Head: Normocephalic.   Nose: Nose normal.   Cardiovascular: Regular rhythm and normal heart sounds.  Exam reveals no S3 and no S4.    No murmur heard.  Pulmonary/Chest: Effort normal and breath sounds normal. She has no decreased breath sounds. She has no wheezes. She has no rhonchi. She has no rales.   Musculoskeletal: She exhibits no edema.        Right hip: She exhibits normal range of motion and normal strength.        Right knee: She exhibits decreased range of motion. Tenderness found.        Right ankle: She exhibits normal range of motion. No tenderness.        Lumbar back: She exhibits decreased range  of motion and tenderness.   (+) SLR right side  Pain along lateral right lower leg     Neurological: She is alert and oriented to person, place, and time. Gait normal.   Reflex Scores:       Patellar reflexes are 2+ on the right side and 2+ on the left side.  Skin: Skin is warm and dry.   Psychiatric: She has a normal mood and affect.       Assessment/Plan   Kajal was seen today for leg pain.    Diagnoses and all orders for this visit:    Sciatic leg pain  Comments:  back exercises provided, needs to get in with PT   Orders:  -     gabapentin (NEURONTIN) 100 MG capsule; Take 1 capsule by mouth At Night As Needed (pain).  -     XR Spine Lumbar 4+ View    Xray with multilevel degenerative changes. Sent for final review. She is accompanied by her daughter. I have reiterated patient needs to see PT therapy.   Controlled substance agreement for obtained. I did advise patient to potential side effects with medication .   JUAN A query complete. Treatment plan to include limited course of prescribed controlled substance. Risks including addiction, benefits, and alternatives presented to patient.

## 2018-08-08 NOTE — PATIENT INSTRUCTIONS
Back Pain, Adult  Many adults have back pain from time to time. Common causes of back pain include:  · A strained muscle or ligament.  · Wear and tear (degeneration) of the spinal disks.  · Arthritis.  · A hit to the back.    Back pain can be short-lived (acute) or last a long time (chronic). A physical exam, lab tests, and imaging studies may be done to find the cause of your pain.  Follow these instructions at home:  Managing pain and stiffness  · Take over-the-counter and prescription medicines only as told by your health care provider.  · If directed, apply heat to the affected area as often as told by your health care provider. Use the heat source that your health care provider recommends, such as a moist heat pack or a heating pad.  ? Place a towel between your skin and the heat source.  ? Leave the heat on for 20-30 minutes.  ? Remove the heat if your skin turns bright red. This is especially important if you are unable to feel pain, heat, or cold. You have a greater risk of getting burned.  · If directed, apply ice to the injured area:  ? Put ice in a plastic bag.  ? Place a towel between your skin and the bag.  ? Leave the ice on for 20 minutes, 2-3 times a day for the first 2-3 days.  Activity  · Do not stay in bed. Resting more than 1-2 days can delay your recovery.  · Take short walks on even surfaces as soon as you are able. Try to increase the length of time you walk each day.  · Do not sit, drive, or  one place for more than 30 minutes at a time. Sitting or standing for long periods of time can put stress on your back.  · Use proper lifting techniques. When you bend and lift, use positions that put less stress on your back:  ? Bend your knees.  ? Keep the load close to your body.  ? Avoid twisting.  · Exercise regularly as told by your health care provider. Exercising will help your back heal faster. This also helps prevent back injuries by keeping muscles strong and flexible.  · Your health  care provider may recommend that you see a physical therapist. This person can help you come up with a safe exercise program. Do any exercises as told by your physical therapist.  Lifestyle  · Maintain a healthy weight. Extra weight puts stress on your back and makes it difficult to have good posture.  · Avoid activities or situations that make you feel anxious or stressed. Learn ways to manage anxiety and stress. One way to manage stress is through exercise. Stress and anxiety increase muscle tension and can make back pain worse.  General instructions  · Sleep on a firm mattress in a comfortable position. Try lying on your side with your knees slightly bent. If you lie on your back, put a pillow under your knees.  · Follow your treatment plan as told by your health care provider. This may include:  ? Cognitive or behavioral therapy.  ? Acupuncture or massage therapy.  ? Meditation or yoga.  Contact a health care provider if:  · You have pain that is not relieved with rest or medicine.  · You have increasing pain going down into your legs or buttocks.  · Your pain does not improve in 2 weeks.  · You have pain at night.  · You lose weight.  · You have a fever or chills.  Get help right away if:  · You develop new bowel or bladder control problems.  · You have unusual weakness or numbness in your arms or legs.  · You develop nausea or vomiting.  · You develop abdominal pain.  · You feel faint.  Summary  · Many adults have back pain from time to time. A physical exam, lab tests, and imaging studies may be done to find the cause of your pain.  · Use proper lifting techniques. When you bend and lift, use positions that put less stress on your back.  · Take over-the-counter and prescription medicines and apply heat or ice as directed by your health care provider.  This information is not intended to replace advice given to you by your health care provider. Make sure you discuss any questions you have with your health care  provider.  Document Released: 12/18/2006 Document Revised: 01/22/2018 Document Reviewed: 01/22/2018  [employer] Interactive Patient Education © 2018 [employer]

## 2018-08-10 ENCOUNTER — TELEPHONE (OUTPATIENT)
Dept: INTERNAL MEDICINE | Facility: CLINIC | Age: 83
End: 2018-08-10

## 2018-08-10 NOTE — TELEPHONE ENCOUNTER
----- Message from Missy Ravi sent at 8/10/2018  2:33 PM EDT -----  Contact: Patient  Informed pt. of Cecile's comments and asked if she scheduled an appointment for pt. Patient stated she would Monday. Please advise    Patient:[phone] (M)

## 2018-08-17 ENCOUNTER — OFFICE VISIT (OUTPATIENT)
Dept: ORTHOPEDIC SURGERY | Facility: CLINIC | Age: 83
End: 2018-08-17

## 2018-08-17 VITALS — TEMPERATURE: 97.8 F | WEIGHT: 145 LBS | HEIGHT: 62 IN | BODY MASS INDEX: 26.68 KG/M2

## 2018-08-17 DIAGNOSIS — M79.604 RIGHT LEG PAIN: ICD-10-CM

## 2018-08-17 DIAGNOSIS — M25.571 ACUTE RIGHT ANKLE PAIN: ICD-10-CM

## 2018-08-17 DIAGNOSIS — M54.16 RIGHT LUMBAR RADICULOPATHY: Primary | ICD-10-CM

## 2018-08-17 PROCEDURE — 99203 OFFICE O/P NEW LOW 30 MIN: CPT | Performed by: ORTHOPAEDIC SURGERY

## 2018-08-17 NOTE — PROGRESS NOTES
"Patient:  Kajal Severino is a 83 y.o. female    Chief Complaint/ Reason for Visit:    Chief Complaint   Patient presents with   • Right Tibia - Establish Care, Pain   • Right Ankle - Establish Care, Pain       HPI:  This pleasant lady presents today accompanied by her daughter.  She reports a 2 month history of pain in her right \"shin\".  She also has some pain in the lateral aspect of her right ankle, and thinks that sometimes the pain radiates from the shin to the ankle.  Occasionally she will have pain along lateral aspect of her right knee.  She says she remembered it first acting up after she had carried a gallon of pain of some stairs and had been painting some baseboards in her home.  She also says that within a day or 2 thereafter, she was doing some gardening and using a gardening stayed frequently.  However, she does not recall a specific injury.  She is had some mild swelling in the ankle.  She has not had any calf pain, chest pain, or shortness of breath.  They pain has been aching and stabbing and severe and constantly present when she is standing and walking.  It feels better when she sits and rests.  She has no complaints in her left lower extremity.  She has had no acute changes in bowel or bladder control.      PMH:    Past Medical History:   Diagnosis Date   • Allergic rhinitis    • Back pain, lumbosacral    • Depression    • Fibrocystic breast    • H/O bone density study 2014   • History of anemia    • History of chest pain    • History of colonic polyps    • Hyperlipidemia    • Hypertension    • Hypothyroidism    • Osteoarthritis    • Osteoporosis    • Potassium deficiency    • Shortness of breath        PSH:    Past Surgical History:   Procedure Laterality Date   • BREAST BIOPSY  1980    x3   •  SECTION N/A     x1   • COLONOSCOPY N/A 10/05/2012    + Diverticulosis and polyps   • PAP SMEAR N/A 11/15/2006       Social Hx:    Social History     Social History   • Marital status:     " " Spouse name: N/A   • Number of children: N/A   • Years of education: N/A     Occupational History   • Not on file.     Social History Main Topics   • Smoking status: Never Smoker   • Smokeless tobacco: Never Used   • Alcohol use No   • Drug use: No   • Sexual activity: No     Other Topics Concern   • Not on file     Social History Narrative   • No narrative on file       Family Hx:    Family History   Problem Relation Age of Onset   • Heart disease Mother    • Diabetes Father        Meds:    Current Outpatient Prescriptions:   •  amLODIPine (NORVASC) 5 MG tablet, TAKE 1 TABLET BY MOUTH DAILY, Disp: 90 tablet, Rfl: 1  •  atenolol-chlorthalidone (TENORETIC) 50-25 MG per tablet, TAKE 1 TABLET BY MOUTH DAILY, Disp: 90 tablet, Rfl: 1  •  fluticasone (FLONASE) 50 MCG/ACT nasal spray, USE 2 SPRAYS IN EACH NOSTRIL EVERY DAY, Disp: 16 mL, Rfl: 2  •  gabapentin (NEURONTIN) 100 MG capsule, Take 1 capsule by mouth At Night As Needed (pain)., Disp: 30 capsule, Rfl: 0  •  levothyroxine (SYNTHROID, LEVOTHROID) 125 MCG tablet, Take 125 mcg by mouth Daily., Disp: , Rfl:   •  losartan (COZAAR) 100 MG tablet, TAKE 1 TABLET BY MOUTH DAILY, Disp: 90 tablet, Rfl: 1    Allergies:  No Known Allergies    ROS:  Review of Systems    Vitals:    08/17/18 1053   Temp: 97.8 °F (36.6 °C)   TempSrc: Temporal Artery    Weight: 65.8 kg (145 lb)   Height: 157.5 cm (62\")     Body mass index is 26.52 kg/m².    Physical Exam    The patient is awake, alert, and oriented ×3.  The patient is in no acute distress.  Breathing is regular and unlabored with a respiratory rate of 12/m.  Extraocular movements and pupillary responses are symmetrically intact. Sclerae are anicteric.   Hearing is within normal limits.  Speech is within normal limits.  There is no jugular venous distention.    When she gets up from a chair, she seems to have some pain in her right leg.  She has a slight limp antalgic from the right.  I don't see any substantial swelling in either of " her lower extremities.  There is no bruising or discoloration.  There is no atrophy or asymmetry.    On seated exam, she has absolutely no tenderness anywhere in her right knee her right leg or tibia or her right ankle.  Her peroneal tendons as well as her tibialis anterior, tibialis posterior, and Achilles all seem to be intact, functioning, and nontender.  Sensory exams grossly intact light touch and pedal pulses are intact.  She has no spasticity, cogwheeling, or clonus.  Her right ankle is stable.  There is no abnormal warmth or effusion.  No manipulation or palpation of the patient's right leg or ankle reproduced any pain.        Radiology: X-rays: 3 views of the patient's right ankle dated June 29 were reviewed from an outside facility.  Comparison images were not available.  These images show evidence of an old avulsion fracture from the tip of the lateral malleolus.  The joint appears mostly preserved and well aligned.  Incidental note is also made of calcific insertional Achilles tendinosis.  Some midfoot degenerative changes are also seen.    X-rays: AP and lateral the patient's right tibia and fibula dated June 14 of this year were also reviewed from an outside facility.  Comparison images were not available.  Other than the findings that can be seen on the ankle films, I don't see any evidence of stress fracture, acute fracture, or other obvious acute osseous or articular abnormality.    X-rays: A 4 view series of the patient's lumbosacral spine was reviewed from an outside facility dated August 8 of this year.  The patient has multilevel degenerative changes with significant facet arthropathy at L4 5 and L5-S1.  She does have a tendency towards some mild L5-S1 degenerative spondylolisthesis.  There is disc height narrowing at multiple levels.  I don't see any obvious acute compression deformities.  There were no comparison films.    Additionally, I reviewed the radiologist's report regarding all of  these films.  I agree with their findings.  There reports also referenced previous images and noted no significant interval changes.          Assessment:     Diagnosis Plan   1. Right lumbar radiculopathy  Ambulatory Referral to Physical Therapy Evaluate and treat, Ortho   2. Right leg pain  Ambulatory Referral to Physical Therapy Evaluate and treat, Ortho   3. Acute right ankle pain  Ambulatory Referral to Physical Therapy Evaluate and treat, Ortho           Plan:  I discussed everything with the patient and her daughter.  I really think her problem is a lumbar radiculopathy.  I don't see any evidence of stress fracture and she certainly has no tenderness in any of the areas where she complains of pain.  I think this strongly suggest a lumbar problem.  I recommended physical therapy.  I do not think an MRI of her lumbar spine would change his recommendation at this time.  The patient and her daughter agree.  She will follow-up on an as-needed basis.  If the therapy doesn't help then she will probably need referral to a spine specialist.    Neurologic precautions were reviewed with the patient and her daughter, and they know to get immediate medical attention for any such deterioration.      Orders Placed This Encounter   Procedures   • Ambulatory Referral to Physical Therapy Evaluate and treat, Ortho     Referral Priority:   Routine     Referral Type:   Therapy     Referral Reason:   Specialty Services Required     Requested Specialty:   Physical Therapy     Number of Visits Requested:   1

## 2018-08-20 ENCOUNTER — TELEPHONE (OUTPATIENT)
Dept: ORTHOPEDIC SURGERY | Facility: CLINIC | Age: 83
End: 2018-08-20

## 2018-09-10 DIAGNOSIS — M54.30 SCIATIC LEG PAIN: ICD-10-CM

## 2018-09-10 DIAGNOSIS — I10 HYPERTENSION, BENIGN: ICD-10-CM

## 2018-09-10 RX ORDER — LOSARTAN POTASSIUM 100 MG/1
TABLET ORAL
Qty: 90 TABLET | Refills: 2 | Status: SHIPPED | OUTPATIENT
Start: 2018-09-10 | End: 2019-03-27

## 2018-09-11 RX ORDER — GABAPENTIN 100 MG/1
CAPSULE ORAL
Qty: 30 CAPSULE | Refills: 0 | OUTPATIENT
Start: 2018-09-11 | End: 2018-11-20

## 2018-10-06 DIAGNOSIS — I10 ESSENTIAL HYPERTENSION: ICD-10-CM

## 2018-10-08 RX ORDER — ATENOLOL AND CHLORTHALIDONE TABLET 50; 25 MG/1; MG/1
TABLET ORAL
Qty: 90 TABLET | Refills: 0 | Status: SHIPPED | OUTPATIENT
Start: 2018-10-08 | End: 2019-01-08 | Stop reason: SDUPTHER

## 2018-11-01 RX ORDER — FLUTICASONE PROPIONATE 50 MCG
SPRAY, SUSPENSION (ML) NASAL
Qty: 16 ML | Refills: 3 | Status: SHIPPED | OUTPATIENT
Start: 2018-11-01 | End: 2022-07-14 | Stop reason: SDUPTHER

## 2018-11-06 ENCOUNTER — TELEPHONE (OUTPATIENT)
Dept: ORTHOPEDIC SURGERY | Facility: CLINIC | Age: 83
End: 2018-11-06

## 2018-11-06 NOTE — TELEPHONE ENCOUNTER
Patient needs updated referral to [facility] at [facility] on [address] [address] - Fax [phone] for Right lumbar radiculopathy, Right leg pain, & Acute Right Ankle pain (referral written 8/17/18 was great but is outdated - [facility] stated could just resubmit with new date). Thanks /srh

## 2018-11-07 DIAGNOSIS — M54.50 BACK PAIN, LUMBOSACRAL: ICD-10-CM

## 2018-11-07 DIAGNOSIS — M54.16 RIGHT LUMBAR RADICULOPATHY: Primary | ICD-10-CM

## 2018-11-07 DIAGNOSIS — M79.604 RIGHT LEG PAIN: ICD-10-CM

## 2018-11-20 ENCOUNTER — OFFICE VISIT (OUTPATIENT)
Dept: INTERNAL MEDICINE | Facility: CLINIC | Age: 83
End: 2018-11-20

## 2018-11-20 VITALS
HEIGHT: 61 IN | SYSTOLIC BLOOD PRESSURE: 144 MMHG | DIASTOLIC BLOOD PRESSURE: 80 MMHG | RESPIRATION RATE: 16 BRPM | OXYGEN SATURATION: 98 % | HEART RATE: 84 BPM | WEIGHT: 144 LBS | BODY MASS INDEX: 27.19 KG/M2

## 2018-11-20 DIAGNOSIS — J30.2 SEASONAL ALLERGIC RHINITIS, UNSPECIFIED TRIGGER: ICD-10-CM

## 2018-11-20 DIAGNOSIS — F51.01 PRIMARY INSOMNIA: ICD-10-CM

## 2018-11-20 DIAGNOSIS — E78.49 OTHER HYPERLIPIDEMIA: ICD-10-CM

## 2018-11-20 DIAGNOSIS — E03.9 ACQUIRED HYPOTHYROIDISM: ICD-10-CM

## 2018-11-20 DIAGNOSIS — M54.16 RIGHT LUMBAR RADICULOPATHY: ICD-10-CM

## 2018-11-20 DIAGNOSIS — E55.9 VITAMIN D DEFICIENCY: ICD-10-CM

## 2018-11-20 DIAGNOSIS — Z86.010 HISTORY OF COLONIC POLYPS: ICD-10-CM

## 2018-11-20 DIAGNOSIS — I10 ESSENTIAL HYPERTENSION: Primary | ICD-10-CM

## 2018-11-20 DIAGNOSIS — Z78.0 MENOPAUSE: ICD-10-CM

## 2018-11-20 DIAGNOSIS — Z00.00 MEDICARE ANNUAL WELLNESS VISIT, SUBSEQUENT: ICD-10-CM

## 2018-11-20 LAB
ALBUMIN SERPL-MCNC: 4.6 G/DL (ref 3.5–5.2)
ALBUMIN/GLOB SERPL: 1.6 G/DL
ALP SERPL-CCNC: 71 U/L (ref 39–117)
ALT SERPL-CCNC: 18 U/L (ref 1–33)
APPEARANCE UR: CLEAR
AST SERPL-CCNC: 15 U/L (ref 1–32)
BASOPHILS # BLD AUTO: 0.02 10*3/MM3 (ref 0–0.2)
BASOPHILS NFR BLD AUTO: 0.3 % (ref 0–2)
BILIRUB SERPL-MCNC: 0.4 MG/DL (ref 0.1–1.2)
BILIRUB UR QL STRIP: NEGATIVE
BUN SERPL-MCNC: 30 MG/DL (ref 8–23)
BUN/CREAT SERPL: 41.1 (ref 7–25)
CALCIUM SERPL-MCNC: 10.2 MG/DL (ref 8.6–10.5)
CASTS URNS MICRO: ABNORMAL
CHLORIDE SERPL-SCNC: 97 MMOL/L (ref 98–107)
CHOLEST SERPL-MCNC: 210 MG/DL (ref 0–200)
CO2 SERPL-SCNC: 28.5 MMOL/L (ref 22–29)
COLOR UR: YELLOW
CONV BACTERIA IN URINE MICRO: ABNORMAL /HPF
CREAT SERPL-MCNC: 0.73 MG/DL (ref 0.57–1)
DEPRECATED RDW RBC AUTO: 41.7 FL (ref 37–54)
EOSINOPHIL # BLD AUTO: 0.13 10*3/MM3 (ref 0–0.7)
EOSINOPHIL NFR BLD AUTO: 1.8 % (ref 0–5)
EPI CELLS #/AREA URNS HPF: ABNORMAL /HPF
ERYTHROCYTE [DISTWIDTH] IN BLOOD BY AUTOMATED COUNT: 12.2 % (ref 11.5–15)
GLOBULIN SER CALC-MCNC: 2.9 GM/DL
GLUCOSE SERPL-MCNC: 130 MG/DL (ref 65–99)
GLUCOSE UR QL: NEGATIVE
HCT VFR BLD AUTO: 43.9 % (ref 34.1–44.9)
HDLC SERPL-MCNC: 62 MG/DL (ref 40–60)
HGB BLD-MCNC: 14.4 G/DL (ref 11.2–15.7)
HGB UR QL STRIP: NEGATIVE
KETONES UR QL STRIP: NEGATIVE
LDLC SERPL CALC-MCNC: 108 MG/DL (ref 0–100)
LEUKOCYTE ESTERASE UR QL STRIP: ABNORMAL
LYMPHOCYTES # BLD AUTO: 1.71 10*3/MM3 (ref 0.8–7)
LYMPHOCYTES NFR BLD AUTO: 23 % (ref 10–60)
MCH RBC QN AUTO: 30.9 PG (ref 26–34)
MCHC RBC AUTO-ENTMCNC: 32.8 G/DL (ref 31–37)
MCV RBC AUTO: 94.2 FL (ref 80–100)
MONOCYTES # BLD AUTO: 0.59 10*3/MM3 (ref 0–1)
MONOCYTES NFR BLD AUTO: 8 % (ref 0–13)
NEUTROPHILS # BLD AUTO: 4.97 10*3/MM3 (ref 1–11)
NEUTROPHILS NFR BLD AUTO: 66.9 % (ref 30–85)
NITRITE UR QL STRIP: NEGATIVE
PH UR STRIP.AUTO: 5.5 [PH] (ref 5–8)
PLATELET # BLD AUTO: 256 10*3/MM3 (ref 150–450)
PMV BLD AUTO: 10.1 FL (ref 6–12)
POTASSIUM SERPL-SCNC: 3.6 MMOL/L (ref 3.5–5.2)
PROT SERPL-MCNC: 7.5 G/DL (ref 6–8.5)
PROTEIN: NEGATIVE
RBC # BLD AUTO: 4.66 10*6/MM3 (ref 3.93–5.22)
RBC #/AREA URNS HPF: ABNORMAL /HPF
SODIUM SERPL-SCNC: 139 MMOL/L (ref 136–145)
SP GR UR: 1.03 (ref 1–1.03)
TRIGL SERPL-MCNC: 200 MG/DL (ref 0–150)
TSH SERPL-ACNC: 0.84 MIU/ML (ref 0.27–4.2)
UROBILINOGEN UR STRIP-MCNC: ABNORMAL MG/DL
VLDLC SERPL-MCNC: 40 MG/DL (ref 5–40)
WBC #/AREA URNS HPF: ABNORMAL /HPF
WBC NRBC COR # BLD: 7.42 10*3/MM3 (ref 5–10)

## 2018-11-20 PROCEDURE — G0439 PPPS, SUBSEQ VISIT: HCPCS | Performed by: INTERNAL MEDICINE

## 2018-11-20 PROCEDURE — 85025 COMPLETE CBC W/AUTO DIFF WBC: CPT | Performed by: INTERNAL MEDICINE

## 2018-11-20 PROCEDURE — 99214 OFFICE O/P EST MOD 30 MIN: CPT | Performed by: INTERNAL MEDICINE

## 2018-11-20 PROCEDURE — 36415 COLL VENOUS BLD VENIPUNCTURE: CPT | Performed by: INTERNAL MEDICINE

## 2018-11-20 PROCEDURE — 96160 PT-FOCUSED HLTH RISK ASSMT: CPT | Performed by: INTERNAL MEDICINE

## 2018-11-20 RX ORDER — AMLODIPINE BESYLATE 5 MG/1
5 TABLET ORAL DAILY
Qty: 90 TABLET | Refills: 3 | Status: SHIPPED | OUTPATIENT
Start: 2018-11-20 | End: 2019-03-22

## 2018-11-20 RX ORDER — TRAZODONE HYDROCHLORIDE 50 MG/1
50 TABLET ORAL NIGHTLY
Qty: 30 TABLET | Refills: 6 | Status: SHIPPED | OUTPATIENT
Start: 2018-11-20 | End: 2019-02-01

## 2018-11-20 NOTE — PROGRESS NOTES
"Alanis Severino is a 83 y.o. female here for   Chief Complaint   Patient presents with   • Medicare Wellness-subsequent   • Hyperlipidemia   • Hypertension   • Hypothyroidism   • Leg Pain     Right   .    Vitals:    11/20/18 0809   BP: 144/80   BP Location: Left arm   Patient Position: Sitting   Cuff Size: Adult   Pulse: 84   Resp: 16   SpO2: 98%   Weight: 65.3 kg (144 lb)   Height: 155.6 cm (61.25\")       Body mass index is 26.99 kg/m².    Hypertension   This is a chronic problem. The current episode started more than 1 year ago. The problem is unchanged. The problem is controlled. Associated symptoms include anxiety. Pertinent negatives include no chest pain, palpitations or shortness of breath.   Hypothyroidism   This is a chronic problem. The current episode started more than 1 year ago. The problem occurs constantly. The problem has been unchanged. Associated symptoms include arthralgias (right leg pain - ortho says it's from back) and fatigue (mild). Pertinent negatives include no chest pain, chills, coughing, fever or numbness.   Leg Pain    The incident occurred more than 1 week ago. There was no injury mechanism. The pain is present in the right leg. The quality of the pain is described as burning. The pain is severe. The pain has been intermittent since onset. Pertinent negatives include no inability to bear weight, loss of motion, loss of sensation, muscle weakness, numbness or tingling. She has tried NSAIDs and acetaminophen for the symptoms.        The following portions of the patient's history were reviewed and updated as appropriate: allergies, current medications, past social history and problem list.    Review of Systems   Constitutional: Positive for fatigue (mild). Negative for chills and fever.   HENT: Positive for postnasal drip and sinus pressure.    Respiratory: Negative for cough, shortness of breath and wheezing.    Cardiovascular: Negative for chest pain, palpitations and leg " swelling.   Musculoskeletal: Positive for arthralgias (right leg pain - ortho says it's from back) and back pain.   Allergic/Immunologic: Positive for environmental allergies.   Neurological: Negative for tingling and numbness.   Psychiatric/Behavioral: Positive for sleep disturbance. Negative for dysphoric mood. The patient is nervous/anxious.        Objective   Physical Exam   Constitutional: She appears well-developed and well-nourished. No distress.   Cardiovascular: Normal rate, regular rhythm and normal heart sounds.   Pulmonary/Chest: No respiratory distress. She has no wheezes. She has no rales. She exhibits no tenderness. Right breast exhibits no inverted nipple, no mass, no nipple discharge, no skin change and no tenderness. Left breast exhibits no inverted nipple, no mass, no nipple discharge, no skin change and no tenderness.   Musculoskeletal: She exhibits no edema.   Psychiatric: She has a normal mood and affect. Her behavior is normal.   Nursing note and vitals reviewed.      Assessment/Plan   Diagnoses and all orders for this visit:    Essential hypertension  Comments:  controlled with medications -call if bp over 140/90  Orders:  -     amLODIPine (NORVASC) 5 MG tablet; Take 1 tablet by mouth Daily.  -     CBC Auto Differential; Future  -     Comprehensive Metabolic Panel; Future  -     Lipid Panel; Future  -     Urinalysis With Microscopic If Indicated (No Culture) - Urine, Clean Catch; Future  -     CBC Auto Differential  -     Comprehensive Metabolic Panel  -     Lipid Panel  -     Cancel: Urinalysis With Microscopic If Indicated (No Culture) - Urine, Clean Catch  -     Urinalysis, Microscopic Only - Urine, Clean Catch; Future  -     Cancel: Urinalysis, Microscopic Only - Urine, Clean Catch  -     Urinalysis With Microscopic If Indicated (No Culture) - Urine, Clean Catch; Future  -     Urinalysis With Microscopic If Indicated (No Culture) - Urine, Clean Catch    Seasonal allergic rhinitis,  unspecified trigger  Comments:  controlled with flonase    Other hyperlipidemia  Comments:  need diet/ex  Orders:  -     Comprehensive Metabolic Panel; Future  -     Lipid Panel; Future  -     Comprehensive Metabolic Panel  -     Lipid Panel    Vitamin D deficiency  Comments:  continue 2,000 units/d    Acquired hypothyroidism  Comments:  continue synthroid 125 mcg daily  Orders:  -     TSH Rfx On Abnormal To Free T4; Future  -     TSH Rfx On Abnormal To Free T4    Right lumbar radiculopathy  Comments:  continue PT for leg pain (from  back pain) - need MRI - consider epidural injections if worse  Orders:  -     MRI Lumbar Spine Without Contrast; Future    History of colonic polyps  Comments:  colonoscopy     Medicare annual wellness visit, subsequent    Primary insomnia  Comments:  trial of trazodone qhs - call if problems persist    Menopause  -     DEXA Bone Density Axial; Future    Other orders  -     traZODone (DESYREL) 50 MG tablet; Take 1 tablet by mouth Every Night. Ok to start with 1/2 pill qhs - then incr to 1qhs.       Wellness today.   Need daily strengthening & balance exercises (shown today).   Need screening for AAA & carotid disease.  Information given today.      Need shingrix & hep A vaccines at pharmacy.    Return 2-3 mos.

## 2018-11-20 NOTE — PATIENT INSTRUCTIONS
Medicare Wellness  Personal Prevention Plan of Service     Date of Office Visit:  2018  Encounter Provider:  Lluvia Liz MD  Place of Service:  [facility] INTER[facility] MED[facility]  Patient Name: Kajal Severino  :  1935    As part of the Medicare Wellness portion of your visit today, we are providing you with this personalized preventive plan of services (PPPS). This plan is based upon recommendations of the [place] Preventive Services Task Force (USPSTF) and the Advisory Committee on Immunization Practices (ACIP).    This lists the preventive care services that should be considered, and provides dates of when you are due. Items listed as completed are up-to-date and do not require any further intervention.    Health Maintenance   Topic Date Due   • ZOSTER VACCINE (2 of 3) 2013   • MAMMOGRAM  2016   • DXA SCAN  2016   • LIPID PANEL  2019   • MEDICARE ANNUAL WELLNESS  2019   • TDAP/TD VACCINES (2 - Td) 2023   • INFLUENZA VACCINE  Completed   • PNEUMOCOCCAL VACCINES (65+ LOW/MEDIUM RISK)  Completed       No orders of the defined types were placed in this encounter.      No Follow-up on file.

## 2018-11-20 NOTE — PROGRESS NOTES
QUICK REFERENCE INFORMATION:  The ABCs of the Annual Wellness Visit    Subsequent Medicare Wellness Visit    HEALTH RISK ASSESSMENT    1935    Recent Hospitalizations:  No hospitalization(s) within the last year..        Current Medical Providers:  Patient Care Team:  Lluvia Liz MD as PCP - General  Lluvia Liz MD as PCP - Family Medicine        Smoking Status:  Social History     Tobacco Use   Smoking Status Never Smoker   Smokeless Tobacco Never Used       Alcohol Consumption:  Social History     Substance and Sexual Activity   Alcohol Use No       Depression Screen:   PHQ-2/PHQ-9 Depression Screening 11/20/2018   Little interest or pleasure in doing things 0   Feeling down, depressed, or hopeless 1   Trouble falling or staying asleep, or sleeping too much 1   Feeling tired or having little energy 1   Poor appetite or overeating 0   Feeling bad about yourself - or that you are a failure or have let yourself or your family down 0   Trouble concentrating on things, such as reading the newspaper or watching television 0   Moving or speaking so slowly that other people could have noticed. Or the opposite - being so fidgety or restless that you have been moving around a lot more than usual 0   Thoughts that you would be better off dead, or of hurting yourself in some way 0   Total Score 3   If you checked off any problems, how difficult have these problems made it for you to do your work, take care of things at home, or get along with other people? Not difficult at all       Health Habits and Functional and Cognitive Screening:  Functional & Cognitive Status 11/20/2018   Do you have difficulty preparing food and eating? No   Do you have difficulty bathing yourself, getting dressed or grooming yourself? No   Do you have difficulty using the toilet? No   Do you have difficulty moving around from place to place? Yes   Do you have trouble with steps or getting out of a bed or a chair? No   In the past  year have you fallen or experienced a near fall? No   Current Diet Well Balanced Diet   Dental Exam Up to date   Eye Exam Up to date   Exercise (times per week) 4 times per week   Current Exercise Activities Include Gardening   Do you need help using the phone?  No   Are you deaf or do you have serious difficulty hearing?  No   Do you need help with transportation? Yes   Do you need help shopping? No   Do you need help preparing meals?  No   Do you need help with housework?  No   Do you need help with laundry? No   Do you need help taking your medications? No   Do you need help managing money? No   Do you ever drive or ride in a car without wearing a seat belt? No   Have you felt unusual stress, anger or loneliness in the last month? Yes   Who do you live with? Child   If you need help, do you have trouble finding someone available to you? No   Have you been bothered in the last four weeks by sexual problems? No   Do you have difficulty concentrating, remembering or making decisions? No           Does the patient have evidence of cognitive impairment? No    Aspirin use counseling: Does not need ASA (and currently is not on it)      Recent Lab Results:  CMP:  Lab Results   Component Value Date    GLU 97 05/09/2018    BUN 19 05/09/2018    CREATININE 0.66 05/09/2018    EGFRIFNONA 86 05/09/2018    EGFRIFAFRI 104 05/09/2018    BCR 28.8 (H) 05/09/2018     05/09/2018    K 3.9 05/09/2018    CO2 29.5 (H) 05/09/2018    CALCIUM 10.1 05/09/2018    PROTENTOTREF 7.2 05/09/2018    ALBUMIN 4.60 05/09/2018    LABGLOBREF 2.6 05/09/2018    LABIL2 1.8 05/09/2018    BILITOT 0.4 05/09/2018    ALKPHOS 54 05/09/2018    AST 27 05/09/2018    ALT 22 05/09/2018     Lipid Panel:  Lab Results   Component Value Date    CHOL 226 (H) 05/02/2017    TRIG 140 05/09/2018    HDL 67 (H) 05/09/2018    VLDL 28 05/09/2018    LDLHDL 1.97 05/02/2017     HbA1c:       Visual Acuity:  No exam data present    Age-appropriate Screening Schedule:  Refer to  the list below for future screening recommendations based on patient's age, sex and/or medical conditions. Orders for these recommended tests are listed in the plan section. The patient has been provided with a written plan.    Health Maintenance   Topic Date Due   • ZOSTER VACCINE (2 of 3) 05/13/2013   • MAMMOGRAM  03/09/2016   • DXA SCAN  11/14/2016   • LIPID PANEL  05/09/2019   • TDAP/TD VACCINES (2 - Td) 03/18/2023   • INFLUENZA VACCINE  Completed   • PNEUMOCOCCAL VACCINES (65+ LOW/MEDIUM RISK)  Completed        Subjective   History of Present Illness    Kajal Severino is a 83 y.o. female who presents for an Subsequent Wellness Visit.    The following portions of the patient's history were reviewed and updated as appropriate: allergies, current medications, past family history, past medical history, past social history, past surgical history and problem list.    Outpatient Medications Prior to Visit   Medication Sig Dispense Refill   • amLODIPine (NORVASC) 5 MG tablet TAKE 1 TABLET BY MOUTH DAILY 90 tablet 1   • atenolol-chlorthalidone (TENORETIC) 50-25 MG per tablet TAKE 1 TABLET BY MOUTH DAILY 90 tablet 0   • fluticasone (FLONASE) 50 MCG/ACT nasal spray USE 2 SPRAYS IN EACH NOSTRIL EVERY DAY 16 mL 3   • levothyroxine (SYNTHROID, LEVOTHROID) 125 MCG tablet Take 125 mcg by mouth Daily.     • losartan (COZAAR) 100 MG tablet TAKE 1 TABLET BY MOUTH DAILY 90 tablet 2   • gabapentin (NEURONTIN) 100 MG capsule TAKE ONE CAPSULE BY MOUTH AT NIGHT AS NEEDED FOR PAIN 30 capsule 0     No facility-administered medications prior to visit.        Patient Active Problem List   Diagnosis   • Hypertension   • Hyperlipidemia   • History of colonic polyps   • Hypothyroidism   • Allergic rhinitis   • Back pain, lumbosacral   • Osteoarthritis   • Vitamin D deficiency   • Acute right ankle pain   • Right leg pain   • Right lumbar radiculopathy       Advance Care Planning:  has NO advance directive - information provided to the patient  "today    Identification of Risk Factors:  Risk factors include: chronic pain.    Review of Systems    Compared to one year ago, the patient feels her physical health is the same.  Compared to one year ago, the patient feels her mental health is the same.    Objective     Physical Exam    Vitals:    11/20/18 0809   BP: 144/80   BP Location: Left arm   Patient Position: Sitting   Cuff Size: Adult   Pulse: 84   Resp: 16   SpO2: 98%   Weight: 65.3 kg (144 lb)   Height: 155.6 cm (61.25\")   PainSc: 0-No pain       Patient's Body mass index is 26.99 kg/m². BMI is above normal parameters. Recommendations include: exercise counseling, no follow-up required and nutrition counseling.      Assessment/Plan   Patient Self-Management and Personalized Health Advice  The patient has been provided with information about: diet, exercise, weight management, prevention of cardiac or vascular disease, the relationship between weight and GERD, fall prevention, designing advance directives and mental health concerns and preventive services including:   · Advance directive, Bone densitometry screening, Counseling for cardiovascular disease risk reduction, Diabetes screening, see lab orders, Exercise counseling provided, Fall Risk assessment done, Fall Risk plan of care done, Nutrition counseling provided, Screening for AAA, referral for ultrasound placed, Screening mammography, referral placed, Urinary Incontinence assessment done, Zostavax vaccine (Herpes Zoster).    Visit Diagnoses:  No diagnosis found.    No orders of the defined types were placed in this encounter.      Outpatient Encounter Medications as of 11/20/2018   Medication Sig Dispense Refill   • amLODIPine (NORVASC) 5 MG tablet TAKE 1 TABLET BY MOUTH DAILY 90 tablet 1   • atenolol-chlorthalidone (TENORETIC) 50-25 MG per tablet TAKE 1 TABLET BY MOUTH DAILY 90 tablet 0   • fluticasone (FLONASE) 50 MCG/ACT nasal spray USE 2 SPRAYS IN EACH NOSTRIL EVERY DAY 16 mL 3   • " levothyroxine (SYNTHROID, LEVOTHROID) 125 MCG tablet Take 125 mcg by mouth Daily.     • losartan (COZAAR) 100 MG tablet TAKE 1 TABLET BY MOUTH DAILY 90 tablet 2   • [DISCONTINUED] gabapentin (NEURONTIN) 100 MG capsule TAKE ONE CAPSULE BY MOUTH AT NIGHT AS NEEDED FOR PAIN 30 capsule 0     No facility-administered encounter medications on file as of 11/20/2018.        Reviewed use of high risk medication in the elderly: not applicable  Reviewed for potential of harmful drug interactions in the elderly: not applicable    Follow Up:  No Follow-up on file.     An After Visit Summary and PPPS with all of these plans were given to the patient.

## 2018-11-21 NOTE — PROGRESS NOTES
Slightly high sugar/cholesterol/fat - need low fat/sugar diet & more exercise. Normal thyroid, urine, etc.

## 2018-12-04 ENCOUNTER — TELEPHONE (OUTPATIENT)
Dept: INTERNAL MEDICINE | Facility: CLINIC | Age: 83
End: 2018-12-04

## 2018-12-04 NOTE — TELEPHONE ENCOUNTER
----- Message from Missy Ravi sent at 12/4/2018 11:46 AM EST -----  Contact: Keena Brink pts. daughter is calling to get MRI results for Pt. She had it don at [facility] and was faxed to us on 11/30. Please advise      Keena:[phone]

## 2018-12-04 NOTE — TELEPHONE ENCOUNTER
Keena pts. daughter is calling to get MRI results for Pt. She had it don at [facility] and was faxed to us on 11/30. Please advise       Keena:[phone]

## 2018-12-05 NOTE — TELEPHONE ENCOUNTER
Report is not scanned in the chart. I have placed the report in your tray for review.    Thank you,    Von

## 2018-12-06 DIAGNOSIS — E03.9 HYPOTHYROIDISM (ACQUIRED): ICD-10-CM

## 2018-12-06 RX ORDER — LEVOTHYROXINE SODIUM 0.12 MG/1
TABLET ORAL
Qty: 90 TABLET | Refills: 2 | Status: SHIPPED | OUTPATIENT
Start: 2018-12-06 | End: 2019-01-28 | Stop reason: SDUPTHER

## 2018-12-07 DIAGNOSIS — M54.16 RIGHT LUMBAR RADICULOPATHY: ICD-10-CM

## 2018-12-13 ENCOUNTER — APPOINTMENT (OUTPATIENT)
Dept: WOMENS IMAGING | Facility: HOSPITAL | Age: 83
End: 2018-12-13

## 2018-12-13 ENCOUNTER — CLINICAL SUPPORT (OUTPATIENT)
Dept: INTERNAL MEDICINE | Facility: CLINIC | Age: 83
End: 2018-12-13

## 2018-12-13 ENCOUNTER — OFFICE VISIT (OUTPATIENT)
Dept: INTERNAL MEDICINE | Facility: CLINIC | Age: 83
End: 2018-12-13

## 2018-12-13 DIAGNOSIS — Z12.31 ENCOUNTER FOR SCREENING MAMMOGRAM FOR BREAST CANCER: ICD-10-CM

## 2018-12-13 DIAGNOSIS — Z78.0 MENOPAUSE: ICD-10-CM

## 2018-12-13 DIAGNOSIS — Z12.31 ENCOUNTER FOR SCREENING MAMMOGRAM FOR BREAST CANCER: Primary | ICD-10-CM

## 2018-12-13 PROCEDURE — 77080 DXA BONE DENSITY AXIAL: CPT | Performed by: INTERNAL MEDICINE

## 2018-12-13 PROCEDURE — 77067 SCR MAMMO BI INCL CAD: CPT | Performed by: INTERNAL MEDICINE

## 2018-12-13 PROCEDURE — 77067 SCR MAMMO BI INCL CAD: CPT | Performed by: RADIOLOGY

## 2018-12-18 ENCOUNTER — DOCUMENTATION (OUTPATIENT)
Dept: INTERNAL MEDICINE | Facility: CLINIC | Age: 83
End: 2018-12-18

## 2018-12-18 DIAGNOSIS — N63.20 LEFT BREAST MASS: Primary | ICD-10-CM

## 2018-12-18 NOTE — PROGRESS NOTES
Spoke with pt daughter, Keena Rider regarding pt recent abnormal screening mammogram. Pt is now scheduled at [facility] on 1-3-18 1615.  Pt daughter requests appt for pt post Holidays.  ECTOR Loja(TAYLOR)(M),ARRT

## 2019-01-08 DIAGNOSIS — I10 ESSENTIAL HYPERTENSION: ICD-10-CM

## 2019-01-09 RX ORDER — ATENOLOL AND CHLORTHALIDONE TABLET 50; 25 MG/1; MG/1
TABLET ORAL
Qty: 90 TABLET | Refills: 1 | Status: SHIPPED | OUTPATIENT
Start: 2019-01-09 | End: 2019-03-27

## 2019-01-15 ENCOUNTER — APPOINTMENT (OUTPATIENT)
Dept: WOMENS IMAGING | Facility: HOSPITAL | Age: 84
End: 2019-01-15

## 2019-01-15 PROCEDURE — 76641 ULTRASOUND BREAST COMPLETE: CPT | Performed by: RADIOLOGY

## 2019-01-15 PROCEDURE — 77065 DX MAMMO INCL CAD UNI: CPT | Performed by: RADIOLOGY

## 2019-01-15 PROCEDURE — MDREVSPNEW: Performed by: RADIOLOGY

## 2019-01-21 DIAGNOSIS — N60.02 BREAST CYST, LEFT: Primary | ICD-10-CM

## 2019-01-21 DIAGNOSIS — N63.20 BREAST MASS, LEFT: ICD-10-CM

## 2019-01-23 ENCOUNTER — APPOINTMENT (OUTPATIENT)
Dept: WOMENS IMAGING | Facility: HOSPITAL | Age: 84
End: 2019-01-23

## 2019-01-23 PROCEDURE — 19083 BX BREAST 1ST LESION US IMAG: CPT | Performed by: RADIOLOGY

## 2019-01-25 ENCOUNTER — TELEPHONE (OUTPATIENT)
Dept: INTERNAL MEDICINE | Facility: CLINIC | Age: 84
End: 2019-01-25

## 2019-01-25 NOTE — TELEPHONE ENCOUNTER
----- Message from Cait Ritchie sent at 1/25/2019  3:17 PM EST -----  Contact: pt - Dr Liz's pt - RE: call  Pt calling and would like to know if ok for pt to take (2) two Trazodone at bedtime. Could you  please call pt to discuss? Please advise. Thanks        Pt # [phone]

## 2019-01-25 NOTE — TELEPHONE ENCOUNTER
----- Message from Cait Ritchie sent at 1/25/2019  3:17 PM EST -----  Contact: pt - Dr Liz's pt - RE: call  Pt calling and would like to know if ok for pt to take (2) two Trazodone at bedtime. Could you  please call pt to discuss? Please advise. Thanks        Pt # [phone]

## 2019-01-28 ENCOUNTER — OFFICE VISIT (OUTPATIENT)
Dept: INTERNAL MEDICINE | Facility: CLINIC | Age: 84
End: 2019-01-28

## 2019-01-28 VITALS
TEMPERATURE: 98.5 F | DIASTOLIC BLOOD PRESSURE: 86 MMHG | WEIGHT: 144 LBS | OXYGEN SATURATION: 98 % | BODY MASS INDEX: 27.19 KG/M2 | HEIGHT: 61 IN | SYSTOLIC BLOOD PRESSURE: 122 MMHG | HEART RATE: 62 BPM

## 2019-01-28 DIAGNOSIS — J98.8 CONGESTION OF RESPIRATORY TRACT: ICD-10-CM

## 2019-01-28 DIAGNOSIS — R11.0 NAUSEA: Primary | ICD-10-CM

## 2019-01-28 DIAGNOSIS — F51.01 PRIMARY INSOMNIA: ICD-10-CM

## 2019-01-28 LAB
ALBUMIN SERPL-MCNC: 4.6 G/DL (ref 3.5–5.2)
ALBUMIN/GLOB SERPL: 1.7 G/DL
ALP SERPL-CCNC: 59 U/L (ref 39–117)
ALT SERPL-CCNC: 22 U/L (ref 1–33)
AMYLASE SERPL-CCNC: 54 U/L (ref 28–100)
AST SERPL-CCNC: 22 U/L (ref 1–32)
BACTERIA UR QL AUTO: ABNORMAL /HPF
BASOPHILS # BLD AUTO: 0.02 10*3/MM3 (ref 0–0.2)
BASOPHILS NFR BLD AUTO: 0.3 % (ref 0–2)
BILIRUB SERPL-MCNC: 0.3 MG/DL (ref 0.1–1.2)
BILIRUB UR QL STRIP: NEGATIVE
BUN SERPL-MCNC: 15 MG/DL (ref 8–23)
BUN/CREAT SERPL: 25.9 (ref 7–25)
CALCIUM SERPL-MCNC: 9.9 MG/DL (ref 8.6–10.5)
CHLORIDE SERPL-SCNC: 94 MMOL/L (ref 98–107)
CLARITY UR: CLEAR
CO2 SERPL-SCNC: 29.4 MMOL/L (ref 22–29)
COLOR UR: YELLOW
CREAT SERPL-MCNC: 0.58 MG/DL (ref 0.57–1)
DEPRECATED RDW RBC AUTO: 42.7 FL (ref 37–54)
EOSINOPHIL # BLD AUTO: 0.09 10*3/MM3 (ref 0–0.7)
EOSINOPHIL NFR BLD AUTO: 1.3 % (ref 0–5)
ERYTHROCYTE [DISTWIDTH] IN BLOOD BY AUTOMATED COUNT: 12.5 % (ref 11.5–15)
GLOBULIN SER CALC-MCNC: 2.7 GM/DL
GLUCOSE SERPL-MCNC: 94 MG/DL (ref 65–99)
GLUCOSE UR STRIP-MCNC: NEGATIVE MG/DL
HCT VFR BLD AUTO: 41.2 % (ref 34.1–44.9)
HGB BLD-MCNC: 13.7 G/DL (ref 11.2–15.7)
HGB UR QL STRIP.AUTO: NEGATIVE
HYALINE CASTS UR QL AUTO: ABNORMAL /LPF
KETONES UR QL STRIP: NEGATIVE
LEUKOCYTE ESTERASE UR QL STRIP.AUTO: ABNORMAL
LIPASE SERPL-CCNC: 40 U/L (ref 13–60)
LYMPHOCYTES # BLD AUTO: 1.89 10*3/MM3 (ref 0.8–7)
LYMPHOCYTES NFR BLD AUTO: 27.6 % (ref 10–60)
MCH RBC QN AUTO: 31.7 PG (ref 26–34)
MCHC RBC AUTO-ENTMCNC: 33.3 G/DL (ref 31–37)
MCV RBC AUTO: 95.4 FL (ref 80–100)
MONOCYTES # BLD AUTO: 0.67 10*3/MM3 (ref 0–1)
MONOCYTES NFR BLD AUTO: 9.8 % (ref 0–13)
MUCOUS THREADS URNS QL MICRO: ABNORMAL /HPF
NEUTROPHILS # BLD AUTO: 4.19 10*3/MM3 (ref 1–11)
NEUTROPHILS NFR BLD AUTO: 61 % (ref 30–85)
NITRITE UR QL STRIP: NEGATIVE
PH UR STRIP.AUTO: 6.5 [PH] (ref 5–8)
PLATELET # BLD AUTO: 241 10*3/MM3 (ref 150–450)
PMV BLD AUTO: 9.4 FL (ref 6–12)
POTASSIUM SERPL-SCNC: 3.8 MMOL/L (ref 3.5–5.2)
PROT SERPL-MCNC: 7.3 G/DL (ref 6–8.5)
PROT UR QL STRIP: NEGATIVE
RBC # BLD AUTO: 4.32 10*6/MM3 (ref 3.93–5.22)
RBC # UR: ABNORMAL /HPF
REF LAB TEST METHOD: ABNORMAL
SODIUM SERPL-SCNC: 138 MMOL/L (ref 136–145)
SP GR UR STRIP: 1.01 (ref 1–1.03)
SQUAMOUS #/AREA URNS HPF: ABNORMAL /HPF
TRANS CELLS #/AREA URNS HPF: ABNORMAL /HPF
UROBILINOGEN UR QL STRIP: ABNORMAL
WBC NRBC COR # BLD: 6.86 10*3/MM3 (ref 5–10)
WBC UR QL AUTO: ABNORMAL /HPF

## 2019-01-28 PROCEDURE — 85025 COMPLETE CBC W/AUTO DIFF WBC: CPT | Performed by: NURSE PRACTITIONER

## 2019-01-28 PROCEDURE — 36415 COLL VENOUS BLD VENIPUNCTURE: CPT | Performed by: NURSE PRACTITIONER

## 2019-01-28 PROCEDURE — 81001 URINALYSIS AUTO W/SCOPE: CPT | Performed by: NURSE PRACTITIONER

## 2019-01-28 PROCEDURE — 99214 OFFICE O/P EST MOD 30 MIN: CPT | Performed by: NURSE PRACTITIONER

## 2019-01-28 RX ORDER — ONDANSETRON 4 MG/1
4 TABLET, FILM COATED ORAL EVERY 8 HOURS PRN
Qty: 30 TABLET | Refills: 0 | Status: SHIPPED | OUTPATIENT
Start: 2019-01-28 | End: 2019-01-31

## 2019-01-29 DIAGNOSIS — N63.20 LEFT BREAST MASS: ICD-10-CM

## 2019-01-29 RX ORDER — GUAIFENESIN 600 MG/1
600 TABLET, EXTENDED RELEASE ORAL EVERY 12 HOURS SCHEDULED
Qty: 14 TABLET
Start: 2019-01-29 | End: 2019-02-05

## 2019-01-29 NOTE — PROGRESS NOTES
Subjective   Kajal Severino is a 83 y.o. female who is brought to the office by her daughter due to nausea with loose, watery stools.    She c/o persistent nausea for the past several weeks with intermittent loose, watery stools. She was started on Trazodone in November for insomnia, has also recently started Melatonin. Denies abdominal pain. She has had mild congestion and drainage. No fever/chills. No rectal bleeding.      Nausea   This is a new problem. The current episode started 1 to 4 weeks ago (sx began gilberto 2 weeks ago). The problem occurs intermittently. The problem has been waxing and waning. Associated symptoms include a change in bowel habit, fatigue, headaches, nausea and a sore throat. Pertinent negatives include no abdominal pain, chest pain, chills, congestion, coughing, fever, neck pain, numbness, vomiting or weakness. She has tried nothing for the symptoms.   Sinus Problem   Associated symptoms include ear pain, headaches, sinus pressure and a sore throat. Pertinent negatives include no chills, congestion, coughing, neck pain, shortness of breath or sneezing.   Diarrhea    Associated symptoms include headaches. Pertinent negatives include no abdominal pain, chills, coughing, fever or vomiting.        The following portions of the patient's history were reviewed and updated as appropriate: allergies, current medications, past social history and problem list.    Past Medical History:   Diagnosis Date   • Allergic rhinitis    • Back pain, lumbosacral    • Depression    • Fibrocystic breast    • H/O bone density study 11/14/2014   • History of anemia    • History of chest pain    • History of colonic polyps    • Hyperlipidemia    • Hypertension    • Hypothyroidism    • Osteoarthritis    • Osteoporosis    • Potassium deficiency    • Shortness of breath          Current Outpatient Medications:   •  amLODIPine (NORVASC) 5 MG tablet, Take 1 tablet by mouth Daily., Disp: 90 tablet, Rfl: 3  •   "atenolol-chlorthalidone (TENORETIC) 50-25 MG per tablet, TAKE 1 TABLET BY MOUTH DAILY, Disp: 90 tablet, Rfl: 1  •  fluticasone (FLONASE) 50 MCG/ACT nasal spray, USE 2 SPRAYS IN EACH NOSTRIL EVERY DAY, Disp: 16 mL, Rfl: 3  •  guaiFENesin (MUCINEX) 600 MG 12 hr tablet, Take 1 tablet by mouth Every 12 (Twelve) Hours for 7 days., Disp: 14 tablet, Rfl:   •  levothyroxine (SYNTHROID, LEVOTHROID) 125 MCG tablet, Take 125 mcg by mouth Daily., Disp: , Rfl:   •  losartan (COZAAR) 100 MG tablet, TAKE 1 TABLET BY MOUTH DAILY, Disp: 90 tablet, Rfl: 2  •  ondansetron (ZOFRAN) 4 MG tablet, Take 1 tablet by mouth Every 8 (Eight) Hours As Needed for Nausea or Vomiting for up to 3 days., Disp: 30 tablet, Rfl: 0  •  traZODone (DESYREL) 50 MG tablet, Take 1 tablet by mouth Every Night. Ok to start with 1/2 pill qhs - then incr to 1qhs., Disp: 30 tablet, Rfl: 6    No Known Allergies    Review of Systems   Constitutional: Positive for fatigue. Negative for appetite change, chills and fever.   HENT: Positive for ear pain, postnasal drip, rhinorrhea, sinus pressure and sore throat. Negative for congestion, ear discharge, facial swelling, sneezing and tinnitus.    Respiratory: Negative for cough, chest tightness, shortness of breath and wheezing.    Cardiovascular: Negative for chest pain, palpitations and leg swelling.   Gastrointestinal: Positive for change in bowel habit, diarrhea and nausea. Negative for abdominal pain and vomiting.   Musculoskeletal: Negative for neck pain and neck stiffness.   Neurological: Positive for headaches. Negative for weakness and numbness.   Hematological: Negative for adenopathy.       Objective   Vitals:    01/28/19 1517   BP: 122/86   BP Location: Left arm   Patient Position: Sitting   Cuff Size: Adult   Pulse: 62   Temp: 98.5 °F (36.9 °C)   TempSrc: Oral   SpO2: 98%   Weight: 65.3 kg (144 lb)   Height: 155.6 cm (61.25\")     Physical Exam   Constitutional: She appears well-developed and well-nourished. " She is cooperative. She does not have a sickly appearance. She does not appear ill.   HENT:   Head: Normocephalic.   Right Ear: Hearing and external ear normal. No drainage, swelling or tenderness. Tympanic membrane is bulging. Tympanic membrane is not erythematous. No middle ear effusion. No decreased hearing is noted.   Left Ear: Hearing and external ear normal. No drainage, swelling or tenderness. Tympanic membrane is bulging. Tympanic membrane is not erythematous.  No middle ear effusion. No decreased hearing is noted.   Nose: Nose normal. No mucosal edema, rhinorrhea, sinus tenderness or nasal deformity. Right sinus exhibits no maxillary sinus tenderness and no frontal sinus tenderness. Left sinus exhibits no maxillary sinus tenderness and no frontal sinus tenderness.   Mouth/Throat: Mucous membranes are normal. Posterior oropharyngeal erythema present.   Eyes: Conjunctivae and lids are normal.   Neck: Trachea normal.   Cardiovascular: Regular rhythm, normal heart sounds and normal pulses.   No murmur heard.  Pulmonary/Chest: Breath sounds normal. No respiratory distress. She has no decreased breath sounds. She has no wheezes. She has no rhonchi. She has no rales.   Abdominal: Soft. Normal appearance and bowel sounds are normal. There is no tenderness.   Lymphadenopathy:     She has no cervical adenopathy.   Neurological: She is alert.   Skin: Skin is warm, dry and intact.   Nursing note and vitals reviewed.      Assessment/Plan   Kajal was seen today for nausea, headache, sinus problem and diarrhea.    Diagnoses and all orders for this visit:    Nausea  -     CBC & Differential  -     Urinalysis With Microscopic If Indicated (No Culture) - Urine, Clean Catch; Future  -     Comprehensive Metabolic Panel; Future  -     Amylase; Future  -     Lipase; Future  -     ondansetron (ZOFRAN) 4 MG tablet; Take 1 tablet by mouth Every 8 (Eight) Hours As Needed for Nausea or Vomiting for up to 3 days.  -     Urinalysis With  Microscopic If Indicated (No Culture) - Urine, Clean Catch  -     Comprehensive Metabolic Panel  -     Amylase  -     Lipase  -     CBC Auto Differential  -     Urinalysis, Microscopic Only - Urine, Clean Catch; Future  -     Urinalysis, Microscopic Only - Urine, Clean Catch    Congestion of respiratory tract  -     guaiFENesin (MUCINEX) 600 MG 12 hr tablet; Take 1 tablet by mouth Every 12 (Twelve) Hours for 7 days.    Primary insomnia  Comments:  has recently added Melatonin    ?etiology of nausea (weight has remained stable), will check labs to further evaluate. She may take Zofran as needed for sx. Consider further evaluation pending lab results.  Her only new medication is Trazodone (and more recently, Melatonin) which I doubt is causing her sx.

## 2019-02-01 ENCOUNTER — TELEPHONE (OUTPATIENT)
Dept: INTERNAL MEDICINE | Facility: CLINIC | Age: 84
End: 2019-02-01

## 2019-02-01 DIAGNOSIS — R11.0 NAUSEA: Primary | ICD-10-CM

## 2019-02-01 NOTE — TELEPHONE ENCOUNTER
Discussed nausea & decr appetite - need abd u/s.  Also discussed depression (similar to episode in the past) - stop trazodone & see psychiatry.

## 2019-02-01 NOTE — TELEPHONE ENCOUNTER
Does the patient already have a psychiatrist? If you would like us to set her up with one could you please place the referral in her chart. Thanks

## 2019-02-01 NOTE — TELEPHONE ENCOUNTER
----- Message from Cait Ritchie sent at 2/1/2019 10:22 AM EST -----  Contact: Keena, daughter - Dr Liz's pt - RE: discuss Rx  Keena, daughter calling and would like a return call regarding Trazodone. She will like to discuss as pt is going in to a deep depression. Keena informs has been doing this for that last 2 weeks. Could you please call to discuss? Please advise. Thanks      Keena, daughter  # [phone]

## 2019-02-18 ENCOUNTER — TELEPHONE (OUTPATIENT)
Dept: INTERNAL MEDICINE | Facility: CLINIC | Age: 84
End: 2019-02-18

## 2019-02-18 NOTE — TELEPHONE ENCOUNTER
----- Message from Roro Osborn sent at 2/18/2019 11:27 AM EST -----  Contact: Keena pts daughter   Pt is having extreme depression, known problem, not eating, fear, not walking well.  Was told to call if things got worse before psych referral appointment, would like to be seen as soon as possible.    Keena, daughter  # [phone]

## 2019-02-18 NOTE — TELEPHONE ENCOUNTER
Discussed - she has appt with psychiatrist in 3 days, but will go to ER if any worsening or if no better.  The TSH was normal 2x in the last year.

## 2019-02-18 NOTE — TELEPHONE ENCOUNTER
I spoke to Keena Severino daughter and informed her per Dr. Liz that it's best they take Ms. Severino to the ER for further evaluation and if needed she can be seen by the psychiatrist at the hospital.     Keena stated that Ms. Severino has been to [facility] and was placed on Remeron 15 mg 19 si tablet daily.     Her daughter stated they were trying to avoid not taking her mother to the hospital but understand they may have too.

## 2019-02-19 ENCOUNTER — HOSPITAL ENCOUNTER (EMERGENCY)
Facility: HOSPITAL | Age: 84
Discharge: HOME | End: 2019-02-19
Attending: EMERGENCY MEDICINE | Admitting: EMERGENCY MEDICINE

## 2019-02-19 VITALS
TEMPERATURE: 98 F | BODY MASS INDEX: 26.34 KG/M2 | HEIGHT: 62 IN | SYSTOLIC BLOOD PRESSURE: 102 MMHG | RESPIRATION RATE: 16 BRPM | OXYGEN SATURATION: 95 % | DIASTOLIC BLOOD PRESSURE: 71 MMHG | HEART RATE: 95 BPM

## 2019-02-19 DIAGNOSIS — F32.A DEPRESSION, UNSPECIFIED DEPRESSION TYPE: Primary | ICD-10-CM

## 2019-02-19 LAB
ALBUMIN SERPL-MCNC: 4.1 G/DL (ref 3.5–5.2)
ALBUMIN/GLOB SERPL: 1.5 G/DL
ALP SERPL-CCNC: 56 U/L (ref 39–117)
ALT SERPL W P-5'-P-CCNC: 21 U/L (ref 1–33)
AMPHET+METHAMPHET UR QL: NEGATIVE
ANION GAP SERPL CALCULATED.3IONS-SCNC: 11.1 MMOL/L
AST SERPL-CCNC: 18 U/L (ref 1–32)
BARBITURATES UR QL SCN: NEGATIVE
BASOPHILS # BLD AUTO: 0.04 10*3/MM3 (ref 0–0.2)
BASOPHILS NFR BLD AUTO: 0.7 % (ref 0–1.5)
BENZODIAZ UR QL SCN: NEGATIVE
BILIRUB SERPL-MCNC: 0.5 MG/DL (ref 0.1–1.2)
BILIRUB UR QL STRIP: NEGATIVE
BUN BLD-MCNC: 16 MG/DL (ref 8–23)
BUN/CREAT SERPL: 28.1 (ref 7–25)
CALCIUM SPEC-SCNC: 9.9 MG/DL (ref 8.6–10.5)
CANNABINOIDS SERPL QL: NEGATIVE
CHLORIDE SERPL-SCNC: 100 MMOL/L (ref 98–107)
CLARITY UR: CLEAR
CO2 SERPL-SCNC: 31.9 MMOL/L (ref 22–29)
COCAINE UR QL: NEGATIVE
COLOR UR: YELLOW
CREAT BLD-MCNC: 0.57 MG/DL (ref 0.57–1)
DEPRECATED RDW RBC AUTO: 42.3 FL (ref 37–54)
EOSINOPHIL # BLD AUTO: 0.07 10*3/MM3 (ref 0–0.4)
EOSINOPHIL NFR BLD AUTO: 1.2 % (ref 0.3–6.2)
ERYTHROCYTE [DISTWIDTH] IN BLOOD BY AUTOMATED COUNT: 12 % (ref 12.3–15.4)
ETHANOL BLD-MCNC: <10 MG/DL (ref 0–10)
ETHANOL UR QL: <0.01 %
GFR SERPL CREATININE-BSD FRML MDRD: 101 ML/MIN/1.73
GLOBULIN UR ELPH-MCNC: 2.8 GM/DL
GLUCOSE BLD-MCNC: 109 MG/DL (ref 65–99)
GLUCOSE UR STRIP-MCNC: NEGATIVE MG/DL
HCT VFR BLD AUTO: 45.2 % (ref 34–46.6)
HGB BLD-MCNC: 14.5 G/DL (ref 12–15.9)
HGB UR QL STRIP.AUTO: NEGATIVE
HOLD SPECIMEN: NORMAL
HOLD SPECIMEN: NORMAL
IMM GRANULOCYTES # BLD AUTO: 0.01 10*3/MM3 (ref 0–0.05)
IMM GRANULOCYTES NFR BLD AUTO: 0.2 % (ref 0–0.5)
KETONES UR QL STRIP: NEGATIVE
LEUKOCYTE ESTERASE UR QL STRIP.AUTO: NEGATIVE
LYMPHOCYTES # BLD AUTO: 1.83 10*3/MM3 (ref 0.7–3.1)
LYMPHOCYTES NFR BLD AUTO: 31.8 % (ref 19.6–45.3)
MCH RBC QN AUTO: 30.9 PG (ref 26.6–33)
MCHC RBC AUTO-ENTMCNC: 32.1 G/DL (ref 31.5–35.7)
MCV RBC AUTO: 96.4 FL (ref 79–97)
METHADONE UR QL SCN: NEGATIVE
MONOCYTES # BLD AUTO: 0.6 10*3/MM3 (ref 0.1–0.9)
MONOCYTES NFR BLD AUTO: 10.4 % (ref 5–12)
NEUTROPHILS # BLD AUTO: 3.2 10*3/MM3 (ref 1.4–7)
NEUTROPHILS NFR BLD AUTO: 55.7 % (ref 42.7–76)
NITRITE UR QL STRIP: NEGATIVE
NRBC BLD AUTO-RTO: 0 /100 WBC (ref 0–0)
OPIATES UR QL: NEGATIVE
OXYCODONE UR QL SCN: NEGATIVE
PH UR STRIP.AUTO: 8.5 [PH] (ref 5–8)
PLATELET # BLD AUTO: 262 10*3/MM3 (ref 140–450)
PMV BLD AUTO: 9.9 FL (ref 6–12)
POTASSIUM BLD-SCNC: 3.5 MMOL/L (ref 3.5–5.2)
PROT SERPL-MCNC: 6.9 G/DL (ref 6–8.5)
PROT UR QL STRIP: NEGATIVE
RBC # BLD AUTO: 4.69 10*6/MM3 (ref 3.77–5.28)
SODIUM BLD-SCNC: 143 MMOL/L (ref 136–145)
SP GR UR STRIP: 1.02 (ref 1–1.03)
UROBILINOGEN UR QL STRIP: ABNORMAL
WBC NRBC COR # BLD: 5.75 10*3/MM3 (ref 3.4–10.8)
WHOLE BLOOD HOLD SPECIMEN: NORMAL
WHOLE BLOOD HOLD SPECIMEN: NORMAL

## 2019-02-19 PROCEDURE — 80307 DRUG TEST PRSMV CHEM ANLYZR: CPT | Performed by: NURSE PRACTITIONER

## 2019-02-19 PROCEDURE — 99284 EMERGENCY DEPT VISIT MOD MDM: CPT

## 2019-02-19 PROCEDURE — 85025 COMPLETE CBC W/AUTO DIFF WBC: CPT | Performed by: NURSE PRACTITIONER

## 2019-02-19 PROCEDURE — 81003 URINALYSIS AUTO W/O SCOPE: CPT | Performed by: NURSE PRACTITIONER

## 2019-02-19 PROCEDURE — 80053 COMPREHEN METABOLIC PANEL: CPT | Performed by: NURSE PRACTITIONER

## 2019-02-19 PROCEDURE — 90791 PSYCH DIAGNOSTIC EVALUATION: CPT | Performed by: MARRIAGE & FAMILY THERAPIST

## 2019-02-19 RX ORDER — TRAZODONE HYDROCHLORIDE 50 MG/1
50 TABLET ORAL NIGHTLY
COMMUNITY
End: 2019-03-22 | Stop reason: ALTCHOICE

## 2019-02-19 RX ORDER — MIRTAZAPINE 15 MG/1
15 TABLET, FILM COATED ORAL NIGHTLY
COMMUNITY
End: 2019-03-22 | Stop reason: ALTCHOICE

## 2019-02-19 NOTE — ED PROVIDER NOTES
Pt presents to the ED c/o worsening depression that began about 3 weeks ago. Pt c/o decreased appetite, difficulty sleeping, and difficulty concentrating. Pt was put on Remeron last week but has not had significant relief. She denies SI and HI.     On exam,   Pt is in NAD, she denies SI, her heart is RRR and lungs CTAB    Plan-Pt will be discharged and f/u with psychiatrist this week    Attestation:  The JOSEPHINE and I have discussed this patient's history, physical exam, and treatment plan.  I have reviewed the documentation and personally had a face to face interaction with the patient. I affirm the documentation and agree with the treatment and plan.  The attached note describes my personal findings.      Documentation assistance provided by yaima Zarco for Dr. Sanders. Information recorded by the princesse was done at my direction and has been verified and validated by me.     Gwen Zarco  02/19/19 1943       Danny Sanders MD  02/19/19 1945

## 2019-02-19 NOTE — ED PROVIDER NOTES
EMERGENCY DEPARTMENT ENCOUNTER    CHIEF COMPLAINT  Chief Complaint: depression  History given by: patient, family  History limited by: N/A  Time Seen: 1557  Room Number: 41/41  PMD: Lluvia Liz MD      HPI:  Pt is a 84 y.o. female who presents with worsening of chronic depression that started about 3 weeks ago. For this duration, she also reports having frontal headaches (gradual onset, moderate in severity), decreased appetite with decreased PO intake, trouble sleeping, trouble concentrating, mood swings, and nausea. She denies recent triggers of her depression, SI/HI, focal weakness, numbness, vision changes, neck stiffness, fever, chills, chest pain, dyspnea, vomiting, diarrhea, abd pain, and pain and difficulty with urination. Per family, pt was seen at [facility] last week and was prescribed remeron which has not provided significant sx relief. Past Medical History of depression, hypothyroidism, HTN, hyperlipidemia, and osteoarthritis.     Duration: started about 3 weeks ago  Timing: constant  Location: psych  Radiation: none  Quality: none  Intensity/Severity: moderate  Progression: worse compared to baseline  Associated Symptoms: frontal headaches (gradual onset, moderate in severity), decreased appetite with decreased PO intake, trouble sleeping, trouble concentrating, nausea, mood swings  Aggravating Factors: none  Alleviating Factors: none  Previous Episodes: Pt states that she has hx of chronic depression.   Treatment before arrival: Per family, pt was seen at [facility] last week and was prescribed remeron which has not provided significant sx relief.    PAST MEDICAL HISTORY  Active Ambulatory Problems     Diagnosis Date Noted   • Hypertension 05/20/2016   • Hyperlipidemia 05/20/2016   • History of colonic polyps 05/20/2016   • Hypothyroidism 05/20/2016   • Allergic rhinitis    • Back pain, lumbosacral    • Osteoarthritis    • Vitamin D deficiency 11/01/2016   • Acute right ankle pain 08/17/2018    • Right leg pain 2018   • Right lumbar radiculopathy 2018   • Primary insomnia 2018     Resolved Ambulatory Problems     Diagnosis Date Noted   • No Resolved Ambulatory Problems     Past Medical History:   Diagnosis Date   • Allergic rhinitis    • Back pain, lumbosacral    • Depression    • Fibrocystic breast    • H/O bone density study 2014   • History of anemia    • History of chest pain    • History of colonic polyps    • Hyperlipidemia    • Hypertension    • Hypothyroidism    • Osteoarthritis    • Osteoporosis    • Potassium deficiency    • Shortness of breath        PAST SURGICAL HISTORY  Past Surgical History:   Procedure Laterality Date   • BREAST BIOPSY  1980    x3   •  SECTION N/A     x1   • COLONOSCOPY N/A 10/05/2012    + Diverticulosis and polyps   • PAP SMEAR N/A 11/15/2006       FAMILY HISTORY  Family History   Problem Relation Age of Onset   • Heart disease Mother    • Diabetes Father        SOCIAL HISTORY  Social History     Socioeconomic History   • Marital status:      Spouse name: Not on file   • Number of children: Not on file   • Years of education: Not on file   • Highest education level: Not on file   Social Needs   • Financial resource strain: Not on file   • Food insecurity - worry: Not on file   • Food insecurity - inability: Not on file   • Transportation needs - medical: Not on file   • Transportation needs - non-medical: Not on file   Occupational History   • Not on file   Tobacco Use   • Smoking status: Never Smoker   • Smokeless tobacco: Never Used   Substance and Sexual Activity   • Alcohol use: No   • Drug use: No   • Sexual activity: No     Partners: Male   Other Topics Concern   • Not on file   Social History Narrative   • Not on file         ALLERGIES  Patient has no known allergies.    REVIEW OF SYSTEMS  Review of Systems   Constitutional: Positive for appetite change (decreased appetite with decreased PO intake). Negative for chills and  fever.   HENT: Negative for sore throat.    Eyes: Negative for visual disturbance.   Respiratory: Negative for shortness of breath.    Cardiovascular: Negative for chest pain.   Gastrointestinal: Positive for nausea. Negative for abdominal pain, diarrhea and vomiting.   Genitourinary: Negative for dysuria.   Musculoskeletal: Negative for back pain.   Skin: Negative for rash.   Neurological: Positive for headaches. Negative for dizziness, weakness and numbness.   Psychiatric/Behavioral: Positive for decreased concentration (trouble concentrating) and sleep disturbance (trouble sleeping). Negative for suicidal ideas.        Worsening of chronic depression, mood swings, denies homicidal ideation       PHYSICAL EXAM  ED Triage Vitals   Temp Heart Rate Resp BP SpO2   02/19/19 1247 02/19/19 1247 02/19/19 1247 02/19/19 1519 02/19/19 1247   98.3 °F (36.8 °C) 67 15 134/82 94 %     Physical Exam   Constitutional: She is oriented to person, place, and time. No distress.   HENT:   Head: Normocephalic.   Mouth/Throat: Mucous membranes are normal.   Eyes: EOM are normal. Pupils are equal, round, and reactive to light.   Neck: Normal range of motion. Neck supple.   Cardiovascular: Normal rate, regular rhythm and normal heart sounds.   Pulmonary/Chest: Effort normal and breath sounds normal. No respiratory distress. She has no decreased breath sounds. She has no wheezes. She has no rhonchi. She has no rales.   Abdominal: Soft. There is no tenderness. There is no rebound and no guarding.   Musculoskeletal: Normal range of motion.   Neurological: She is alert and oriented to person, place, and time. She has normal sensation.   No focal neuro deficits, answers questions appropriately, follows commands   Skin: Skin is warm and dry.   Psychiatric: Her mood appears anxious. She expresses no homicidal and no suicidal ideation.   Nursing note and vitals reviewed.      LAB RESULTS  Recent Results (from the past 24 hour(s))   Light Blue Top     Collection Time: 02/19/19  3:39 PM   Result Value Ref Range    Extra Tube hold for add-on    Green Top (Gel)    Collection Time: 02/19/19  3:39 PM   Result Value Ref Range    Extra Tube Hold for add-ons.    Lavender Top    Collection Time: 02/19/19  3:39 PM   Result Value Ref Range    Extra Tube hold for add-on    Gold Top - SST    Collection Time: 02/19/19  3:39 PM   Result Value Ref Range    Extra Tube Hold for add-ons.    Comprehensive Metabolic Panel    Collection Time: 02/19/19  3:39 PM   Result Value Ref Range    Glucose 109 (H) 65 - 99 mg/dL    BUN 16 8 - 23 mg/dL    Creatinine 0.57 0.57 - 1.00 mg/dL    Sodium 143 136 - 145 mmol/L    Potassium 3.5 3.5 - 5.2 mmol/L    Chloride 100 98 - 107 mmol/L    CO2 31.9 (H) 22.0 - 29.0 mmol/L    Calcium 9.9 8.6 - 10.5 mg/dL    Total Protein 6.9 6.0 - 8.5 g/dL    Albumin 4.10 3.50 - 5.20 g/dL    ALT (SGPT) 21 1 - 33 U/L    AST (SGOT) 18 1 - 32 U/L    Alkaline Phosphatase 56 39 - 117 U/L    Total Bilirubin 0.5 0.1 - 1.2 mg/dL    eGFR Non African Amer 101 >60 mL/min/1.73    Globulin 2.8 gm/dL    A/G Ratio 1.5 g/dL    BUN/Creatinine Ratio 28.1 (H) 7.0 - 25.0    Anion Gap 11.1 mmol/L   Ethanol    Collection Time: 02/19/19  3:39 PM   Result Value Ref Range    Ethanol <10 0 - 10 mg/dL    Ethanol % <0.010 %   CBC Auto Differential    Collection Time: 02/19/19  3:39 PM   Result Value Ref Range    WBC 5.75 3.40 - 10.80 10*3/mm3    RBC 4.69 3.77 - 5.28 10*6/mm3    Hemoglobin 14.5 12.0 - 15.9 g/dL    Hematocrit 45.2 34.0 - 46.6 %    MCV 96.4 79.0 - 97.0 fL    MCH 30.9 26.6 - 33.0 pg    MCHC 32.1 31.5 - 35.7 g/dL    RDW 12.0 (L) 12.3 - 15.4 %    RDW-SD 42.3 37.0 - 54.0 fl    MPV 9.9 6.0 - 12.0 fL    Platelets 262 140 - 450 10*3/mm3    Neutrophil % 55.7 42.7 - 76.0 %    Lymphocyte % 31.8 19.6 - 45.3 %    Monocyte % 10.4 5.0 - 12.0 %    Eosinophil % 1.2 0.3 - 6.2 %    Basophil % 0.7 0.0 - 1.5 %    Immature Grans % 0.2 0.0 - 0.5 %    Neutrophils, Absolute 3.20 1.40 - 7.00 10*3/mm3     Lymphocytes, Absolute 1.83 0.70 - 3.10 10*3/mm3    Monocytes, Absolute 0.60 0.10 - 0.90 10*3/mm3    Eosinophils, Absolute 0.07 0.00 - 0.40 10*3/mm3    Basophils, Absolute 0.04 0.00 - 0.20 10*3/mm3    Immature Grans, Absolute 0.01 0.00 - 0.05 10*3/mm3    nRBC 0.0 0.0 - 0.0 /100 WBC   Urinalysis With Microscopic If Indicated (No Culture) - Urine, Clean Catch    Collection Time: 02/19/19  6:10 PM   Result Value Ref Range    Color, UA Yellow Yellow, Straw    Appearance, UA Clear Clear    pH, UA 8.5 (H) 5.0 - 8.0    Specific Gravity, UA 1.016 1.005 - 1.030    Glucose, UA Negative Negative    Ketones, UA Negative Negative    Bilirubin, UA Negative Negative    Blood, UA Negative Negative    Protein, UA Negative Negative    Leuk Esterase, UA Negative Negative    Nitrite, UA Negative Negative    Urobilinogen, UA 0.2 E.U./dL 0.2 - 1.0 E.U./dL   Urine Drug Screen - Urine, Clean Catch    Collection Time: 02/19/19  6:10 PM   Result Value Ref Range    Amphet/Methamphet, Screen Negative Negative    Barbiturates Screen, Urine Negative Negative    Benzodiazepine Screen, Urine Negative Negative    Cocaine Screen, Urine Negative Negative    Opiate Screen Negative Negative    THC, Screen, Urine Negative Negative    Methadone Screen, Urine Negative Negative    Oxycodone Screen, Urine Negative Negative       I ordered the above labs and reviewed the results      PROGRESS AND CONSULTS    1713- Ordered blood work, UA, UDS, and BAL for further evaluation. Consulted [facility].     1815- Reviewed pt's history and workup with Dr. Sanders.  At bedside evaluation, they agree with the plan of care.    1915- Discussed case with [facility]   Reviewed history, exam, results and treatments.  Discussed concerns and plan of care. [facility]  has seen and evaluated pt in ED and has cleared her for discharge. [facility] recommends that pt follow up closely with [facility] as scheduled on 2/21/19 for further management.      1926- Rechecked pt. She  is resting comfortably and is in no acute distress. Discussed with pt and family about all pertinent results including normal WBC count, unremarkable UA, and otherwise stable labs. Provided discharge instructions for major depressive disorder (adult). Instructed pt to well hydrate and to eat food as tolerated.  Reviewed implications of results, diagnosis, meds, responsibility to follow up, warning signs and symptoms of possible worsening, potential complications and reasons to return to ER with patient and family.  Discussed all results and noted any abnormalities with patient and family.  Discussed with pt and family about absolute need to have pt follow up closely with [facility] as scheduled on 2/21/19 and with PMD for recheck of abnormalities and condition and for further management.  Discussed plan for discharge, as there is no emergent indication for admission.  Pt and family are agreeable and understand need for follow up and repeat testing.  They are aware that discharge does not mean that nothing is wrong but it indicates no emergency is present.  Pt is discharged with instructions to follow up with primary care doctor to have their blood pressure rechecked.       DIAGNOSIS  Final diagnoses:   Depression, unspecified depression type       FOLLOW UP   Lluvia Liz MD  [address] [facility] [address]  [place] [address]  [phone]    In 1 day      [address]  [place] [address]  [phone]    keep scheduled appointment on Thursday        COURSE & MEDICAL DECISION MAKING  Pertinent Labs and Imaging studies that were ordered and reviewed are noted above.  Results were reviewed/discussed with the patient and family and they were also made aware of online assess.   Pt and family also made aware that some labs, such as cultures, will not be resulted during ER visit and follow up with PMD is necessary.     MEDICATIONS GIVEN IN ER  Medications - No data to display    BP  "117/68 (BP Location: Left arm, Patient Position: Lying)   Pulse 94   Temp 98.3 °F (36.8 °C) (Tympanic)   Resp 16   Ht 157.5 cm (62\")   LMP  (LMP Unknown)   SpO2 96%   BMI 26.34 kg/m²       I personally reviewed the past medical history, past surgical history, social history, family history, current medications and allergies as they appear in this chart.  The scribe's note accurately reflects the work and decisions made by me.     Documentation assistance provided by yaima Hand for LISANDRA Gann on 2/19/2019 at 7:32 PM. Information recorded by the scribe was done at my direction and has been verified and validated by me.       Roxane Hand  02/19/19 1938       Myra Manjarrez APRN  02/19/19 2000    "

## 2019-02-19 NOTE — ED NOTES
"Pt states \"I've been depressed for some time.\" She reports decreased PO intake and having increasing confusion.     Saloni Jim RN  02/19/19 3195    "

## 2019-02-19 NOTE — ED NOTES
"Per pt's daughter, she brought pt in today due to pt not eating or drinking very much; she only had yogurt and sips of water today. Over the last 3 weeks, pt has had worsening depression. Pt was seen at [facility] last week and was rx Remeron which \"made things worse.\" Pt denies recent stressors causing depression. Daughter reports an \"episode\" 13 years ago but has never had to see therapist or take psych meds. Pt also c/o a frontal HA. Pt has list of other complaints- nausea, increased fears, trouble focusing, increased grimacing, increased confusion, and completing sentences/thoughts. Pt's PCP suggested that pt come to ED. Pt denies SI/HI. Pt does not have any neuro symptoms at this time and is A&O x4. .Reassurance given; call light in reach. Pts breathing even and unlabored. Pt appears in NAD at this time. Family at bedside.           Martell, Stephie, RN  02/19/19 6938    "

## 2019-02-20 NOTE — DISCHARGE INSTRUCTIONS
Continue current home medications  Keep scheduled appointment on Thursday  Follow up with PMD as needed  Return to er for fever, chills, vomiting, shortness of air, chest pain, thoughts of wanting to harm yourself, worsening depression or any new or worsening symptoms
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
## 2019-03-22 ENCOUNTER — OFFICE VISIT (OUTPATIENT)
Dept: INTERNAL MEDICINE | Facility: CLINIC | Age: 84
End: 2019-03-22

## 2019-03-22 VITALS
RESPIRATION RATE: 15 BRPM | HEIGHT: 61 IN | HEART RATE: 55 BPM | TEMPERATURE: 97.6 F | DIASTOLIC BLOOD PRESSURE: 70 MMHG | WEIGHT: 131 LBS | BODY MASS INDEX: 24.73 KG/M2 | OXYGEN SATURATION: 96 % | SYSTOLIC BLOOD PRESSURE: 90 MMHG

## 2019-03-22 DIAGNOSIS — R53.1 WEAKNESS: ICD-10-CM

## 2019-03-22 DIAGNOSIS — R15.9 INCONTINENCE OF FECES, UNSPECIFIED FECAL INCONTINENCE TYPE: ICD-10-CM

## 2019-03-22 DIAGNOSIS — E55.9 VITAMIN D DEFICIENCY: ICD-10-CM

## 2019-03-22 DIAGNOSIS — F41.9 ANXIETY: ICD-10-CM

## 2019-03-22 DIAGNOSIS — E03.9 ACQUIRED HYPOTHYROIDISM: ICD-10-CM

## 2019-03-22 DIAGNOSIS — R63.0 ANOREXIA: ICD-10-CM

## 2019-03-22 DIAGNOSIS — G44.049 CHRONIC PAROXYSMAL HEMICRANIA, NOT INTRACTABLE: ICD-10-CM

## 2019-03-22 DIAGNOSIS — N39.46 MIXED STRESS AND URGE URINARY INCONTINENCE: ICD-10-CM

## 2019-03-22 DIAGNOSIS — F33.2 SEVERE EPISODE OF RECURRENT MAJOR DEPRESSIVE DISORDER, WITHOUT PSYCHOTIC FEATURES (HCC): ICD-10-CM

## 2019-03-22 DIAGNOSIS — R10.30 LOWER ABDOMINAL PAIN: ICD-10-CM

## 2019-03-22 DIAGNOSIS — R11.0 NAUSEA: ICD-10-CM

## 2019-03-22 DIAGNOSIS — I10 ESSENTIAL HYPERTENSION: Primary | ICD-10-CM

## 2019-03-22 DIAGNOSIS — E78.49 OTHER HYPERLIPIDEMIA: ICD-10-CM

## 2019-03-22 LAB
25(OH)D3 SERPL-MCNC: 55.3 NG/ML (ref 30–100)
ALBUMIN SERPL-MCNC: 3.9 G/DL (ref 3.5–5.2)
ALBUMIN/GLOB SERPL: 1.4 G/DL
ALP SERPL-CCNC: 59 U/L (ref 39–117)
ALT SERPL-CCNC: 34 U/L (ref 1–33)
AST SERPL-CCNC: 26 U/L (ref 1–32)
BASOPHILS # BLD AUTO: 0.05 10*3/MM3 (ref 0–0.2)
BASOPHILS NFR BLD AUTO: 0.8 % (ref 0–1.5)
BILIRUB SERPL-MCNC: 0.4 MG/DL (ref 0.2–1.2)
BUN SERPL-MCNC: 27 MG/DL (ref 8–23)
BUN/CREAT SERPL: 29.3 (ref 7–25)
CALCIUM SERPL-MCNC: 9.7 MG/DL (ref 8.6–10.5)
CHLORIDE SERPL-SCNC: 92 MMOL/L (ref 98–107)
CHOLEST SERPL-MCNC: 127 MG/DL (ref 0–200)
CO2 SERPL-SCNC: 29.9 MMOL/L (ref 22–29)
CREAT SERPL-MCNC: 0.92 MG/DL (ref 0.57–1)
EOSINOPHIL # BLD AUTO: 0.03 10*3/MM3 (ref 0–0.4)
EOSINOPHIL # BLD AUTO: 0.5 % (ref 0.3–6.2)
ERYTHROCYTE [DISTWIDTH] IN BLOOD BY AUTOMATED COUNT: 12.6 % (ref 12.3–15.4)
FOLATE SERPL-MCNC: >20 NG/ML (ref 4.78–24.2)
GLOBULIN SER CALC-MCNC: 2.8 GM/DL
GLUCOSE SERPL-MCNC: 144 MG/DL (ref 65–99)
HBA1C MFR BLD: 5.9 % (ref 4.8–5.6)
HCT VFR BLD AUTO: 68.5 % (ref 34–46.6)
HDLC SERPL-MCNC: 42 MG/DL (ref 40–60)
HGB BLD-MCNC: 22.9 G/DL (ref 12–15.9)
IMM GRANULOCYTES # BLD: 0.02 10*3/MM3 (ref 0–0.05)
IMM GRANULOCYTES NFR BLD: 0.3 % (ref 0–0.5)
LDLC SERPL CALC-MCNC: 60 MG/DL (ref 0–100)
LYMPHOCYTES # BLD AUTO: 1.31 10*3/MM3 (ref 0.7–3.1)
LYMPHOCYTES NFR BLD AUTO: 20.9 % (ref 19.6–45.3)
MCH RBC QN AUTO: 30.8 PG (ref 26.6–33)
MCHC RBC AUTO-ENTMCNC: 33.4 G/DL (ref 31.5–35.7)
MCV RBC AUTO: 92.2 FL (ref 79–97)
MONOCYTES # BLD AUTO: 0.33 10*3/MM3 (ref 0.1–0.9)
MONOCYTES NFR BLD AUTO: 5.3 % (ref 5–12)
NEUTROPHILS # BLD AUTO: 4.54 10*3/MM3 (ref 1.4–7)
NEUTROPHILS NFR BLD AUTO: 72.2 % (ref 42.7–76)
PLATELET # BLD AUTO: 66 10*3/MM3 (ref 140–450)
POTASSIUM SERPL-SCNC: 3.5 MMOL/L (ref 3.5–5.2)
PROT SERPL-MCNC: 6.7 G/DL (ref 6–8.5)
RBC # BLD AUTO: 7.43 10*6/MM3 (ref 3.77–5.28)
SODIUM SERPL-SCNC: 137 MMOL/L (ref 136–145)
T4 FREE SERPL-MCNC: 1.77 NG/DL (ref 0.93–1.7)
TRIGL SERPL-MCNC: 123 MG/DL (ref 0–150)
TSH SERPL-ACNC: 0.05 MIU/ML (ref 0.27–4.2)
VIT B12 SERPL-MCNC: 854 PG/ML (ref 211–946)
VLDLC SERPL-MCNC: 24.6 MG/DL (ref 5–40)
WBC # BLD AUTO: 6.28 10*3/MM3 (ref 3.4–10.8)

## 2019-03-22 PROCEDURE — 99214 OFFICE O/P EST MOD 30 MIN: CPT | Performed by: INTERNAL MEDICINE

## 2019-03-22 RX ORDER — ESCITALOPRAM OXALATE 10 MG/1
1 TABLET ORAL DAILY
Refills: 3 | COMMUNITY
Start: 2019-02-21 | End: 2019-12-17 | Stop reason: SDUPTHER

## 2019-03-22 RX ORDER — ARIPIPRAZOLE 2 MG/1
1 TABLET ORAL DAILY
Refills: 3 | COMMUNITY
Start: 2019-03-06 | End: 2019-07-16

## 2019-03-22 RX ORDER — CHOLECALCIFEROL (VITAMIN D3) 125 MCG
5 CAPSULE ORAL NIGHTLY
COMMUNITY
End: 2022-01-13

## 2019-03-22 NOTE — PROGRESS NOTES
"Alanis Severino is a 84 y.o. female here for   Chief Complaint   Patient presents with   • Hyperlipidemia     4 month follow-up   • Hypertension   • Hypothyroidism   • Depression   • Headache   .    Vitals:    03/22/19 1502   BP: 90/70   BP Location: Left arm   Patient Position: Sitting   Cuff Size: Adult   Pulse: 55   Resp: 15   Temp: 97.6 °F (36.4 °C)   TempSrc: Temporal   SpO2: 96%   Weight: 59.4 kg (131 lb)   Height: 155.6 cm (61.25\")       Body mass index is 24.55 kg/m².    Hyperlipidemia   This is a chronic problem. The current episode started more than 1 year ago. The problem is controlled. Recent lipid tests were reviewed and are normal. Exacerbating diseases include hypothyroidism. Pertinent negatives include no chest pain or shortness of breath.   Hypertension   Associated symptoms include headaches. Pertinent negatives include no chest pain, palpitations or shortness of breath.   Hypothyroidism   This is a chronic problem. The current episode started more than 1 year ago. The problem occurs constantly. The problem has been unchanged. Associated symptoms include abdominal pain, coughing, fatigue, headaches and weakness (all over). Pertinent negatives include no chest pain, chills or fever.   Depression   Visit Type: follow-up  Patient presents with the following symptoms: nervousness/anxiety.  Patient is not experiencing: palpitations and shortness of breath.  Frequency of symptoms: constantly     Headache    This is a chronic problem. The current episode started more than 1 month ago. The problem occurs intermittently. The problem has been gradually worsening. Associated symptoms include abdominal pain, coughing and weakness (all over). Pertinent negatives include no fever.        The following portions of the patient's history were reviewed and updated as appropriate: allergies, current medications, past social history and problem list.    Review of Systems   Constitutional: Positive for fatigue. " Negative for chills and fever.   Respiratory: Positive for cough. Negative for shortness of breath and wheezing.    Cardiovascular: Negative for chest pain, palpitations and leg swelling.   Gastrointestinal: Positive for abdominal pain and diarrhea (incontinence). Negative for blood in stool.   Genitourinary: Negative for dysuria.   Neurological: Positive for weakness (all over) and headaches.   Psychiatric/Behavioral: Positive for dysphoric mood and sleep disturbance. The patient is nervous/anxious.      She is seeing psych NP & psychologist - they recommend labs.    Objective   Physical Exam   Constitutional: She appears well-developed and well-nourished. No distress.   HENT:   Head: Normocephalic and atraumatic.   Eyes: Pupils are equal, round, and reactive to light.   Cardiovascular: Normal rate, regular rhythm and normal heart sounds.   Pulmonary/Chest: No respiratory distress. She has no wheezes. She has no rales. She exhibits no tenderness.   Abdominal: Soft. She exhibits no distension and no mass. There is tenderness. There is no rebound and no guarding. No hernia.   Musculoskeletal: She exhibits no edema.   Neurological: No cranial nerve deficit. She exhibits normal muscle tone. Coordination normal.   Skin: Skin is warm and dry.   Psychiatric: Her speech is normal. Thought content normal. She is withdrawn. Cognition and memory are impaired. She exhibits a depressed mood.   Nursing note and vitals reviewed.    She has lost 13#    Assessment/Plan   Diagnoses and all orders for this visit:    Essential hypertension  Comments:  very low bp b/c dehydration?-stop amlodipine & losartan - only 1/2 tenoretic,then stop if bp ok - call with bp readings  Orders:  -     CBC Auto Differential; Future  -     Comprehensive Metabolic Panel; Future  -     CBC Auto Differential  -     Comprehensive Metabolic Panel    Other hyperlipidemia  -     CBC Auto Differential; Future  -     Comprehensive Metabolic Panel; Future  -      Lipid Panel; Future  -     CBC Auto Differential  -     Comprehensive Metabolic Panel  -     Lipid Panel    Acquired hypothyroidism  Comments:  need rechk    Anorexia  Comments:  labs today    Nausea    Anxiety  Comments:  f/u with psychiatric team (started abilify & lexapro) - to get neuropsych testing    Severe episode of recurrent major depressive disorder, without psychotic features (CMS/HCC)  Comments:  f/u with dr andre rodriguez (psychologist) & NP psychiaty    Mixed stress and urge urinary incontinence  Comments:  need abd ct    Incontinence of feces, unspecified fecal incontinence type  Comments:  need abd CT  Orders:  -     Cancel: Hemoglobin A1c; Future    Lower abdominal pain  Comments:  need CT of abd/pelvis  Orders:  -     CT Abdomen Pelvis Without Contrast; Future    Chronic paroxysmal hemicrania, not intractable  Comments:  new h/a- need head CT  Orders:  -     CT Head Without Contrast; Future    Weakness  Comments:  generalized - need labs  Orders:  -     Vitamin B12 & Folate; Future  -     T3, Free; Future  -     T4, Free; Future  -     TSH; Future  -     Cancel: Vitamin D 25 Hydroxy; Future  -     Ambulatory Referral to Neurology  -     Hemoglobin A1c; Future  -     Vitamin B12 & Folate  -     T3, Free  -     T4, Free  -     TSH  -     Hemoglobin A1c    Vitamin D deficiency  Comments:  need rehk  Orders:  -     Vitamin D 25 hydroxy; Future  -     Vitamin D 25 hydroxy    Other orders  -     ARIPiprazole (ABILIFY) 2 MG tablet; Take 1 tablet by mouth Daily.  -     escitalopram (LEXAPRO) 10 MG tablet; Take 1 tablet by mouth Daily.  -     melatonin 5 MG tablet tablet; Take 5 mg by mouth Every Night.          Off all bp meds for now except atenolol - 1/2 daily for 2 days then stop if bp always less than 120 - will restart losartan only if high bp over 130.

## 2019-03-23 LAB — T3FREE SERPL-MCNC: 3.5 PG/ML (ref 2–4.4)

## 2019-03-25 DIAGNOSIS — D75.1 ERYTHROCYTOSIS: Primary | ICD-10-CM

## 2019-03-26 ENCOUNTER — HOSPITAL ENCOUNTER (OUTPATIENT)
Dept: CT IMAGING | Facility: HOSPITAL | Age: 84
Discharge: HOME | End: 2019-03-26
Admitting: INTERNAL MEDICINE

## 2019-03-26 DIAGNOSIS — R10.30 LOWER ABDOMINAL PAIN: ICD-10-CM

## 2019-03-26 DIAGNOSIS — G44.049 CHRONIC PAROXYSMAL HEMICRANIA, NOT INTRACTABLE: ICD-10-CM

## 2019-03-26 PROCEDURE — 74176 CT ABD & PELVIS W/O CONTRAST: CPT

## 2019-03-26 PROCEDURE — 70450 CT HEAD/BRAIN W/O DYE: CPT

## 2019-03-27 ENCOUNTER — LAB (OUTPATIENT)
Dept: OTHER | Facility: HOSPITAL | Age: 84
End: 2019-03-27

## 2019-03-27 ENCOUNTER — CONSULT (OUTPATIENT)
Dept: ONCOLOGY | Facility: CLINIC | Age: 84
End: 2019-03-27

## 2019-03-27 VITALS
SYSTOLIC BLOOD PRESSURE: 144 MMHG | RESPIRATION RATE: 16 BRPM | WEIGHT: 130.1 LBS | DIASTOLIC BLOOD PRESSURE: 84 MMHG | OXYGEN SATURATION: 96 % | BODY MASS INDEX: 24.56 KG/M2 | HEIGHT: 61 IN | TEMPERATURE: 97.5 F | HEART RATE: 84 BPM

## 2019-03-27 DIAGNOSIS — F51.01 PRIMARY INSOMNIA: ICD-10-CM

## 2019-03-27 DIAGNOSIS — D75.1 ERYTHROCYTOSIS: Primary | ICD-10-CM

## 2019-03-27 DIAGNOSIS — D50.9 IRON DEFICIENCY ANEMIA, UNSPECIFIED IRON DEFICIENCY ANEMIA TYPE: Primary | ICD-10-CM

## 2019-03-27 LAB
BASOPHILS # BLD AUTO: 0.03 10*3/MM3 (ref 0–0.2)
BASOPHILS NFR BLD AUTO: 0.3 % (ref 0–1.5)
DEPRECATED RDW RBC AUTO: 36.7 FL (ref 37–54)
EOSINOPHIL # BLD AUTO: 0.08 10*3/MM3 (ref 0–0.4)
EOSINOPHIL NFR BLD AUTO: 0.9 % (ref 0.3–6.2)
ERYTHROCYTE [DISTWIDTH] IN BLOOD BY AUTOMATED COUNT: 11.4 % (ref 12.3–15.4)
FERRITIN SERPL-MCNC: 123.7 NG/ML (ref 13–150)
HCT VFR BLD AUTO: 42.7 % (ref 34–46.6)
HGB BLD-MCNC: 14.8 G/DL (ref 12–15.9)
HGB RETIC QN AUTO: 33.8 PG (ref 29.8–36.1)
IMM GRANULOCYTES # BLD AUTO: 0.05 10*3/MM3 (ref 0–0.05)
IMM GRANULOCYTES NFR BLD AUTO: 0.6 % (ref 0–0.5)
IMM RETICS NFR: 7.9 % (ref 3–15.8)
IRON 24H UR-MRATE: 53 MCG/DL (ref 37–145)
IRON SATN MFR SERPL: 13 % (ref 20–50)
LYMPHOCYTES # BLD AUTO: 1.81 10*3/MM3 (ref 0.7–3.1)
LYMPHOCYTES NFR BLD AUTO: 20.3 % (ref 19.6–45.3)
MCH RBC QN AUTO: 30.9 PG (ref 26.6–33)
MCHC RBC AUTO-ENTMCNC: 34.7 G/DL (ref 31.5–35.7)
MCV RBC AUTO: 89.1 FL (ref 79–97)
MONOCYTES # BLD AUTO: 0.66 10*3/MM3 (ref 0.1–0.9)
MONOCYTES NFR BLD AUTO: 7.4 % (ref 5–12)
NEUTROPHILS # BLD AUTO: 6.28 10*3/MM3 (ref 1.4–7)
NEUTROPHILS NFR BLD AUTO: 70.5 % (ref 42.7–76)
NRBC BLD AUTO-RTO: 0 /100 WBC (ref 0–0)
PLATELET # BLD AUTO: 257 10*3/MM3 (ref 140–450)
PMV BLD AUTO: 9.8 FL (ref 6–12)
RBC # BLD AUTO: 4.79 10*6/MM3 (ref 3.77–5.28)
RETICS/RBC NFR AUTO: 0.92 % (ref 0.5–1.5)
TIBC SERPL-MCNC: 416 MCG/DL (ref 298–536)
TRANSFERRIN SERPL-MCNC: 279 MG/DL (ref 200–360)
WBC NRBC COR # BLD: 8.91 10*3/MM3 (ref 3.4–10.8)

## 2019-03-27 PROCEDURE — 36415 COLL VENOUS BLD VENIPUNCTURE: CPT

## 2019-03-27 PROCEDURE — 82728 ASSAY OF FERRITIN: CPT | Performed by: INTERNAL MEDICINE

## 2019-03-27 PROCEDURE — 85025 COMPLETE CBC W/AUTO DIFF WBC: CPT | Performed by: INTERNAL MEDICINE

## 2019-03-27 PROCEDURE — 85046 RETICYTE/HGB CONCENTRATE: CPT | Performed by: INTERNAL MEDICINE

## 2019-03-27 PROCEDURE — 99205 OFFICE O/P NEW HI 60 MIN: CPT | Performed by: INTERNAL MEDICINE

## 2019-03-27 PROCEDURE — 83540 ASSAY OF IRON: CPT | Performed by: INTERNAL MEDICINE

## 2019-03-27 PROCEDURE — 84466 ASSAY OF TRANSFERRIN: CPT | Performed by: INTERNAL MEDICINE

## 2019-03-27 NOTE — PROGRESS NOTES
Subjective     REASON FOR CONSULTATION: Polycythemia now resolved  Provide an opinion on any further workup or treatment                             REQUESTING PHYSICIAN: Lluvia Liz    RECORDS OBTAINED:  Records of the patients history including those obtained from the referring provider were reviewed and summarized in detail.    HISTORY OF PRESENT ILLNESS:  The patient is a 84 y.o. year old female who is here for an opinion about the above issue.    History of Present Illness patient is a 84-year-old female who was referred here by Dr. Lluvia Liz because of her CBC showing hemoglobin of 22.9 and a hematocrit of 68.  She also had a platelet count of 66.  Patient apparently has lost a lot of weight in the last few months, she has decreased appetite and has history of depression recurrent.  She has quit eating and hardly eats too much.  She has HER-2 daughters with her and both tell me that that her mother is not normal.  But patient has had such episodes in the past and has required hospitalizations.  She also has a psychiatrist and a psychologist working with her and she was supposed to have neuropsych evaluation but has not had it.  She does not have history of dementia.  She does have nausea and no evidence of abdominal pain.  She has some mild chronic back pain and chronic headaches.  She had a CT of the head recently done which is negative.  CT of the abdomen and pelvis is pending today.  She does not have any abdominal pain.  Her TSH was low and free T4 was mildly elevated and Dr. Liz adjusted her Synthroid medicine.  Her B12 and folate were normal which were drawn on March 22, 2019.  She does have significant fatigue.  She denies any active GI bleeding.  January 2019 she had amylase and lipase drawn which were normal.    Has history of alcohol use in the past but none for many years.  She actually follows up with AA.  Back at her CBC from 5 days ago on March 22, 2019 her hemoglobin is 22.9  hematocrit 68.5 white count of 6.28 and a platelet of 66.  She had a normal differential count.  Today her hemoglobin is 14.8 hematocrit of 42.7 white count of 8.91 and platelet count of 250 7K which is normal as before.  She has 70% neutrophils 20% lymphocytes 7.4% eosinophils 0.3% basophils .  Denies any fever or night sweats.  There is no evidence of any lymphadenopathy in the neck axilla or groin.  There is no hepatosplenomegaly.    She has history of hypertension and has been on losartan, atenolol and amlodipine which were all discontinued last week because her blood pressure was low.  Today her blood pressure is back up high and    Past Medical History:   Diagnosis Date   • Allergic rhinitis    • Back pain, lumbosacral    • Depression    • Erythrocytosis    • Fibrocystic breast    • H/O bone density study 2014   • History of anemia    • History of chest pain    • History of colonic polyps    • Hyperlipidemia    • Hypertension    • Hypothyroidism    • Osteoarthritis    • Osteoporosis    • Potassium deficiency    • Shortness of breath         Past Surgical History:   Procedure Laterality Date   • BREAST BIOPSY Bilateral 1980    x3   •  SECTION N/A     x1   • COLONOSCOPY N/A 10/05/2012    + Diverticulosis and polyps   • PAP SMEAR N/A 11/15/2006        Current Outpatient Medications on File Prior to Visit   Medication Sig Dispense Refill   • ARIPiprazole (ABILIFY) 2 MG tablet Take 1 tablet by mouth Daily.  3   • escitalopram (LEXAPRO) 10 MG tablet Take 1 tablet by mouth Daily.  3   • fluticasone (FLONASE) 50 MCG/ACT nasal spray USE 2 SPRAYS IN EACH NOSTRIL EVERY DAY 16 mL 3   • levothyroxine (SYNTHROID, LEVOTHROID) 125 MCG tablet Take 125 mcg by mouth Daily.     • melatonin 5 MG tablet tablet Take 5 mg by mouth Every Night.     • atenolol-chlorthalidone (TENORETIC) 50-25 MG per tablet TAKE 1 TABLET BY MOUTH DAILY 90 tablet 1   • losartan (COZAAR) 100 MG tablet TAKE 1 TABLET BY MOUTH DAILY 90 tablet 2      No current facility-administered medications on file prior to visit.         ALLERGIES:  No Known Allergies   OB/GYN history she is  8 para 4 and 3 miscarriages.    Social history she lives with her son.  She has 1 son and 3 daughters they all live here in town.        Social History     Socioeconomic History   • Marital status:      Spouse name: Not on file   • Number of children: Not on file   • Years of education: Not on file   • Highest education level: Not on file   Occupational History     Employer: RETIRED   Tobacco Use   • Smoking status: Never Smoker   • Smokeless tobacco: Never Used   Substance and Sexual Activity   • Alcohol use: No   • Drug use: No   • Sexual activity: No     Partners: Male      Family history is consistent with a brother having leukemia, one brother had prostate and colon cancer.  One sister had breast cancer.  And she had one child who had breast cancer.  The brother who had leukemia has chronic myeloid leukemia.  Family History   Problem Relation Age of Onset   • Heart disease Mother    • Diabetes Father         Review of Systems   Constitutional: Positive for appetite change, fatigue and unexpected weight change. Negative for chills, diaphoresis and fever.   HENT: Negative for hearing loss, sore throat and trouble swallowing.    Respiratory: Negative for cough, chest tightness, shortness of breath and wheezing.    Cardiovascular: Negative for chest pain, palpitations and leg swelling.   Gastrointestinal: Positive for abdominal pain, diarrhea and nausea. Negative for abdominal distention, constipation and vomiting.   Genitourinary: Negative for dysuria, frequency, hematuria and urgency.   Musculoskeletal: Negative for joint swelling.        No muscle weakness.   Skin: Negative for rash and wound.   Neurological: Positive for weakness and headaches. Negative for seizures, syncope, speech difficulty and numbness.   Hematological: Negative for adenopathy. Does not  "bruise/bleed easily.   Psychiatric/Behavioral: Negative for behavioral problems, confusion and suicidal ideas.        Depression   All other systems reviewed and are negative.       Objective     Vitals:    03/27/19 1256   BP: 144/84   Pulse: 84   Resp: 16   Temp: 97.5 °F (36.4 °C)   TempSrc: Oral   SpO2: 96%   Weight: 59 kg (130 lb 1.6 oz)   Height: 155 cm (61.02\")   PainSc: 0-No pain     No flowsheet data found.    Physical Exam    GENERAL:  Well-developed, well-nourished in no acute distress.   SKIN:  Warm, dry without rashes, purpura or petechiae.  EYES:  Pupils equal, round and reactive to light.  EOMs intact.  Conjunctivae normal.  EARS:  Hearing intact.  NOSE:  Septum midline.  No excoriations or nasal discharge.  MOUTH:  Tongue is well-papillated; no stomatitis or ulcers.  Lips normal.  THROAT:  Oropharynx without lesions or exudates.  NECK:  Supple with good range of motion; no thyromegaly or masses, no JVD.  LYMPHATICS:  No cervical, supraclavicular, axillary or inguinal adenopathy.  CHEST:  Lungs clear to auscultation. Good airflow.  CARDIAC:  Regular rate and rhythm without murmurs, rubs or gallops. Normal S1,S2.  ABDOMEN:  Soft, nontender with no hepatosplenomegaly or masses.  EXTREMITIES:  No clubbing, cyanosis or edema.  NEUROLOGICAL:  Cranial Nerves II-XII grossly intact.  No focal neurological deficits.  PSYCHIATRIC:  Normal affect and mood.        RECENT LABS:  Hematology WBC   Date Value Ref Range Status   03/27/2019 8.91 3.40 - 10.80 10*3/mm3 Final   03/22/2019 6.28 3.40 - 10.80 10*3/mm3 Final     RBC   Date Value Ref Range Status   03/27/2019 4.79 3.77 - 5.28 10*6/mm3 Final   03/22/2019 7.43 (H) 3.77 - 5.28 10*6/mm3 Final     Hemoglobin   Date Value Ref Range Status   03/27/2019 14.8 12.0 - 15.9 g/dL Final     Hematocrit   Date Value Ref Range Status   03/27/2019 42.7 34.0 - 46.6 % Final     Platelets   Date Value Ref Range Status   03/27/2019 257 140 - 450 10*3/mm3 Final      CT Abdomen Pelvis " 03/26/19  No results yet. In process.    CT HEAD  IMPRESSION:     No evidence for acute intracranial pathology.      Assessment/Plan     1.  Elevated hemoglobin and hematocrit with thrombocytopenia, it appears that patient has had one episode last week when she went to see Dr. Lluvia Liz when she had a hemoglobin of 22.9 and a hematocrit of 68.  She also had a platelet of 66.  But today is totally normalized.  I have my doubts if the blood count that was done in Dr. Liz's office was really hers as today without any intervention there normalized I have left a message with the lab to see what the cost was.  Today her hemoglobin is back to normal and hematocrit is normal.  She has had a B12 folate which have been checked and normal.  Her thyroid function test does show that there is slight decrease in the TSH and her Synthroid was adjusted by her internist.  Today we normalized I believe I would labs today rather than the one 5 days ago    2.  Depression, patient has had history of recurrent depression in the past and in 1959 she required ECT treatments in the postpartum.  She follows up with a psychiatrist and a psychologist and currently are pending to obtain a neuropsychological testing.  Today she is alert and oriented x3 but appears to be depressed.  According to the children they say that this is not her normal self.    3. Left breast diagnostic mammogram showed an oval mass in the left breast at 12 o'clock position on December 13, 2018 and this mass was present with partially obscured margins in the middle one third region at 12 o'clock position of complete breast ultrasound was done which showed an oval hypoechoic mass in the left breast at 12 o'clock position measuring 11-5 mm no evidence of any left enlarged lymph nodes.    Left breast biopsy showed pericystic fibrosis and mild chronic inflammation but negative for malignancy or atypia.    Plan 1.  I will review her peripheral smear.  Today all of  the labs are normal and I think the lab drawn on 5 days ago was really either due to secondary to dehydration at the time or it was a different patient.  I have checked with lab to check on it.    2.  I have discussed with lab people to see if they could track back the CBC done on May 22 and see if what the cause was for it being so abnormal and unusual for her labs.    3.  We will plan to bring her back in 2 weeks in 4 weeks for a CBC check and see her in 6 weeks.      4.  Reviewed her endocrine from December 2018, she had a small mass in the left breast, CT-guided biopsy of this left breast mass was negative.    5.  Obtain CT of the chest abdomen pelvis as a workup for weight loss.    Follow-up in 6 weeks with me  .  Radha Scruggs MD

## 2019-03-29 ENCOUNTER — TELEPHONE (OUTPATIENT)
Dept: ONCOLOGY | Facility: HOSPITAL | Age: 84
End: 2019-03-29

## 2019-03-29 NOTE — TELEPHONE ENCOUNTER
Called and went over labs with pts daughter. No other questions or concerns.     ----- Message from Vanessa Ahuja sent at 3/29/2019 12:46 PM EDT -----  [phone]  Keena Mandujano  Daughter    Wants to get lab results from the 27th if their ready.  She is going out of town.

## 2019-04-09 ENCOUNTER — TELEPHONE (OUTPATIENT)
Dept: GENERAL RADIOLOGY | Facility: HOSPITAL | Age: 84
End: 2019-04-09

## 2019-04-09 ENCOUNTER — LAB (OUTPATIENT)
Dept: LAB | Facility: HOSPITAL | Age: 84
End: 2019-04-09

## 2019-04-09 ENCOUNTER — CLINICAL SUPPORT (OUTPATIENT)
Dept: ONCOLOGY | Facility: HOSPITAL | Age: 84
End: 2019-04-09

## 2019-04-09 ENCOUNTER — HOSPITAL ENCOUNTER (OUTPATIENT)
Dept: PET IMAGING | Facility: HOSPITAL | Age: 84
Discharge: HOME | End: 2019-04-09
Admitting: INTERNAL MEDICINE

## 2019-04-09 DIAGNOSIS — D50.9 IRON DEFICIENCY ANEMIA, UNSPECIFIED IRON DEFICIENCY ANEMIA TYPE: ICD-10-CM

## 2019-04-09 LAB
BASOPHILS # BLD AUTO: 0.04 10*3/MM3 (ref 0–0.2)
BASOPHILS NFR BLD AUTO: 0.6 % (ref 0–1.5)
CREAT BLDA-MCNC: 0.6 MG/DL (ref 0.6–1.3)
DEPRECATED RDW RBC AUTO: 41.1 FL (ref 37–54)
EOSINOPHIL # BLD AUTO: 0.07 10*3/MM3 (ref 0–0.4)
EOSINOPHIL NFR BLD AUTO: 1 % (ref 0.3–6.2)
ERYTHROCYTE [DISTWIDTH] IN BLOOD BY AUTOMATED COUNT: 11.9 % (ref 12.3–15.4)
HCT VFR BLD AUTO: 42.7 % (ref 34–46.6)
HGB BLD-MCNC: 14 G/DL (ref 12–15.9)
IMM GRANULOCYTES # BLD AUTO: 0.05 10*3/MM3 (ref 0–0.05)
IMM GRANULOCYTES NFR BLD AUTO: 0.7 % (ref 0–0.5)
LYMPHOCYTES # BLD AUTO: 1.63 10*3/MM3 (ref 0.7–3.1)
LYMPHOCYTES NFR BLD AUTO: 22.9 % (ref 19.6–45.3)
MCH RBC QN AUTO: 30.6 PG (ref 26.6–33)
MCHC RBC AUTO-ENTMCNC: 32.8 G/DL (ref 31.5–35.7)
MCV RBC AUTO: 93.4 FL (ref 79–97)
MONOCYTES # BLD AUTO: 0.59 10*3/MM3 (ref 0.1–0.9)
MONOCYTES NFR BLD AUTO: 8.3 % (ref 5–12)
NEUTROPHILS # BLD AUTO: 4.74 10*3/MM3 (ref 1.4–7)
NEUTROPHILS NFR BLD AUTO: 66.5 % (ref 42.7–76)
NRBC BLD AUTO-RTO: 0 /100 WBC (ref 0–0)
PLATELET # BLD AUTO: 195 10*3/MM3 (ref 140–450)
PMV BLD AUTO: 9.7 FL (ref 6–12)
RBC # BLD AUTO: 4.57 10*6/MM3 (ref 3.77–5.28)
WBC NRBC COR # BLD: 7.12 10*3/MM3 (ref 3.4–10.8)

## 2019-04-09 PROCEDURE — 36415 COLL VENOUS BLD VENIPUNCTURE: CPT | Performed by: INTERNAL MEDICINE

## 2019-04-09 PROCEDURE — 71260 CT THORAX DX C+: CPT

## 2019-04-09 PROCEDURE — 82565 ASSAY OF CREATININE: CPT

## 2019-04-09 PROCEDURE — 25010000002 IOPAMIDOL 61 % SOLUTION: Performed by: INTERNAL MEDICINE

## 2019-04-09 PROCEDURE — 85025 COMPLETE CBC W/AUTO DIFF WBC: CPT | Performed by: INTERNAL MEDICINE

## 2019-04-09 RX ADMIN — IOPAMIDOL 75 ML: 612 INJECTION, SOLUTION INTRAVENOUS at 13:08

## 2019-04-09 NOTE — TELEPHONE ENCOUNTER
----- Message from Delmi Abdi RN sent at 4/9/2019  1:02 PM EDT -----  Please cancel all future appnts here for patient. No need to call them. We are releasing her back to her PCP per

## 2019-04-09 NOTE — PROGRESS NOTES
Patient and daughter in for lab review today. hgb again wnl. Seems there was an error in the initial hgb that had her sent here. Pt's daughter requesting to be released from our care since it was clearly a lab error and there is no need for her to keep coming here when they see  regularly. Ok per  to release pt back under care of . Pts daughter called and notified. Msg sent to scheduling to have all future appnts here cancelled.

## 2019-04-19 ENCOUNTER — OFFICE VISIT (OUTPATIENT)
Dept: INTERNAL MEDICINE | Facility: CLINIC | Age: 84
End: 2019-04-19

## 2019-04-19 VITALS
BODY MASS INDEX: 25.15 KG/M2 | HEIGHT: 61 IN | WEIGHT: 133.2 LBS | DIASTOLIC BLOOD PRESSURE: 84 MMHG | SYSTOLIC BLOOD PRESSURE: 134 MMHG

## 2019-04-19 DIAGNOSIS — F41.9 ANXIETY: ICD-10-CM

## 2019-04-19 DIAGNOSIS — E03.9 HYPOTHYROIDISM, UNSPECIFIED TYPE: Primary | ICD-10-CM

## 2019-04-19 DIAGNOSIS — E78.49 OTHER HYPERLIPIDEMIA: ICD-10-CM

## 2019-04-19 DIAGNOSIS — F33.9 MONOPOLAR DEPRESSION (HCC): ICD-10-CM

## 2019-04-19 DIAGNOSIS — I10 ESSENTIAL HYPERTENSION: ICD-10-CM

## 2019-04-19 DIAGNOSIS — Z91.030 BEE STING ALLERGY: ICD-10-CM

## 2019-04-19 PROBLEM — D75.1 POLYCYTHEMIA: Status: RESOLVED | Noted: 2019-03-27 | Resolved: 2019-04-19

## 2019-04-19 PROCEDURE — 99214 OFFICE O/P EST MOD 30 MIN: CPT | Performed by: INTERNAL MEDICINE

## 2019-04-19 RX ORDER — EPINEPHRINE 0.3 MG/.3ML
0.3 INJECTION SUBCUTANEOUS ONCE
Qty: 1 EACH | Refills: 2 | Status: SHIPPED | OUTPATIENT
Start: 2019-04-19 | End: 2019-04-19

## 2019-04-19 RX ORDER — LEVOTHYROXINE SODIUM 0.1 MG/1
TABLET ORAL
Qty: 90 TABLET | Refills: 2 | Status: SHIPPED | OUTPATIENT
Start: 2019-04-19 | End: 2019-12-17 | Stop reason: SDUPTHER

## 2019-04-19 NOTE — PROGRESS NOTES
"Alanis Severino is a 84 y.o. female here for   Chief Complaint   Patient presents with   • Depression     1 month follow-up   • Hyperlipidemia   • Hypertension   • Hypothyroidism   .    Vitals:    04/19/19 1119 04/19/19 1238   BP: 124/72 134/84   BP Location: Left arm    Patient Position: Sitting    Cuff Size: Adult    Weight: 60.4 kg (133 lb 3.2 oz)    Height: 155.6 cm (61.25\")        Body mass index is 24.96 kg/m².    Depression Patient presents with the following symptoms: nervousness/anxiety.    Hyperlipidemia   This is a chronic problem. The current episode started more than 1 year ago. The problem is controlled. Recent lipid tests were reviewed and are normal. Exacerbating diseases include hypothyroidism. She has no history of diabetes. Pertinent negatives include no chest pain.   Hypertension   This is a chronic problem. The current episode started more than 1 year ago. The problem is unchanged. The problem is controlled. Pertinent negatives include no chest pain.   Hypothyroidism   This is a chronic problem. The current episode started more than 1 year ago. The problem occurs constantly. The problem has been unchanged. Associated symptoms include fatigue. Pertinent negatives include no chest pain, chills, coughing or fever.        The following portions of the patient's history were reviewed and updated as appropriate: allergies, current medications, past social history and problem list.    Review of Systems   Constitutional: Positive for fatigue. Negative for chills and fever.   Respiratory: Negative for cough and wheezing.    Cardiovascular: Negative for chest pain and leg swelling.   Psychiatric/Behavioral: Positive for dysphoric mood and sleep disturbance. The patient is nervous/anxious.      BP =120-150'S systolic    Objective   Physical Exam   Constitutional: She appears well-developed and well-nourished. No distress.   Cardiovascular: Normal rate, regular rhythm and normal heart sounds. "   Pulmonary/Chest: No respiratory distress. She has no wheezes. She has no rales. She exhibits no tenderness.   Musculoskeletal: She exhibits no edema.   Psychiatric: She has a normal mood and affect. Her behavior is normal.   Nursing note and vitals reviewed.    She requests epipen    Assessment/Plan   Diagnoses and all orders for this visit:    Hypothyroidism, unspecified type  Comments:  she will change now from 125 mcg 5d/wk & 61mg 2x weekly - now to take 100mcg daily  Orders:  -     levothyroxine (SYNTHROID) 100 MCG tablet; 1 daily    Essential hypertension  Comments:  higher now (off all bp meds) - ok to restart losartan if bp always over 145/95 -cotinue to hold amlodipin& atenolol/hct    Other hyperlipidemia    Bee sting allergy  -     EPINEPHrine (EPIPEN 2-DAVID) 0.3 MG/0.3ML solution auto-injector injection; Inject 0.3 mL into the appropriate muscle as directed by prescriber 1 (One) Time for 1 dose.    Anxiety  Comments:  f/u with psychiatrist - to get neuropsych testing next wk    Monopolar depression (CMS/HCC)  Comments:  better now     CT chest ok - need rechk 6 mos (discussed with daughter).

## 2019-04-23 ENCOUNTER — TELEPHONE (OUTPATIENT)
Dept: ORTHOPEDIC SURGERY | Facility: CLINIC | Age: 84
End: 2019-04-23

## 2019-04-29 ENCOUNTER — OFFICE VISIT (OUTPATIENT)
Dept: NEUROLOGY | Facility: CLINIC | Age: 84
End: 2019-04-29

## 2019-04-29 VITALS
SYSTOLIC BLOOD PRESSURE: 134 MMHG | WEIGHT: 131.1 LBS | HEIGHT: 61 IN | BODY MASS INDEX: 24.75 KG/M2 | DIASTOLIC BLOOD PRESSURE: 90 MMHG | HEART RATE: 77 BPM | OXYGEN SATURATION: 99 %

## 2019-04-29 DIAGNOSIS — R41.3 MEMORY LOSS: ICD-10-CM

## 2019-04-29 DIAGNOSIS — F32.A DEPRESSION, UNSPECIFIED DEPRESSION TYPE: Primary | ICD-10-CM

## 2019-04-29 PROCEDURE — 99204 OFFICE O/P NEW MOD 45 MIN: CPT | Performed by: PSYCHIATRY & NEUROLOGY

## 2019-04-29 NOTE — PROGRESS NOTES
Subjective:     Patient ID: Kajal Severino is a 84 y.o. female.    Ms. Severino is an 84 year old female with a h/o allergies, anxiety, arthritis, headache, HLD, HTN, depresion and thyroid disease who presents to the neurology clinic today as a new patient for the evaluation of weakness. Daughter is concerned about symptoms that started 3 months ago.  Symptoms include depression/anxiety, balance problems (no falls), loss of appetite (afraid to swallow, not choking), trouble getting her words out.  Symptoms have worsened over the 3 months.  Has problems with memory and concentration.  One day, couldn't get up and move.  Wasn't eating or drinking and may have been dehydrated.  Reports weakness as generalized.  Lives with son (52).  He cooks for her and does laundry.  Daughter helps with finances and is POA.  Had an similar episode in 2006.  Was hospitalized at inpatient psychiatric units 3 times.  Saw a neurologist who placed her on aricept and LTG.  After 10 months, got back to her normal self.  Daughter feels that her current symptoms are similar, but more severe this time.  Describes mood as depressed.  No thoughts of hurting self.  Daughter has been helping with some things, but trying to push her to do things on her own.  Got dressed on own this am.  Kids fill the pill box for her.  Personality seems depressed, anxiety, fearful.  Scared to stay by herself.  Lost 12 lbs due to decreased appetite.  Eating the same types of foods.  Sleep is ok.  Doesn't drive.  No hallucinations.  Recently had some fecal incontinence with diarrhea.  No longer has hobbies.  Used to sing, bake, watching TV, reading.  Stayed in bed for prolonged periods of time.    Loss of consciousness/head trauma? no  Seizures/strokes/CNS infections? no  Family history of neurological disease? no    Has neuropsychological testing coming up.     I reviewed the patient's record.  I reviewed Dr. Liz's note from 3/22/19.  The patient has a h/o HLD, HTN,  hypothyroidism, depression and headache.  She was dx with chronic paroxysmal hemicrania and a head CT was ordered.  Neurology referral was placed for weakness and blood work was also ordered.  CBC showed a Hgb of 22.9 and platelets of 66.  Oncology thought that it may be lab error.  B12 was normal.  TSH was low.  Head CT on 3/26/19 was normal.  I personally reviewed the images and appreciate mild to moderate diffuse atrophy.      The following portions of the patient's history were reviewed and updated as appropriate: allergies, current medications, past family history, past medical history, past social history, past surgical history and problem list.    Review of Systems   Constitutional: Positive for activity change, appetite change, fatigue and unexpected weight change.   HENT: Negative for facial swelling, sinus pressure and trouble swallowing.    Eyes: Negative for pain, redness and visual disturbance.   Respiratory: Negative for chest tightness, shortness of breath and wheezing.    Cardiovascular: Negative for chest pain, palpitations and leg swelling.   Gastrointestinal: Positive for diarrhea. Negative for nausea and vomiting.   Endocrine: Negative for cold intolerance, heat intolerance and polyphagia.   Genitourinary: Negative for difficulty urinating, frequency and urgency.   Musculoskeletal: Positive for gait problem. Negative for back pain and neck pain.   Neurological: Positive for dizziness, tremors, speech difficulty, weakness, light-headedness and headaches. Negative for seizures, syncope, facial asymmetry and numbness.   Hematological: Does not bruise/bleed easily.   Psychiatric/Behavioral: Positive for agitation, confusion, decreased concentration and sleep disturbance. Negative for behavioral problems, dysphoric mood, hallucinations, self-injury and suicidal ideas. The patient is nervous/anxious. The patient is not hyperactive.    I reviewed the ROS documented by the MA.         Objective:    Neurologic Exam    Physical Exam   Constitutional:  Vital signs reviewed.  No apparent distress.  Well groomed.  Eyes:  No injection, no icterus.  Fundoscopic exam performed.  No papilledema appreciated bilaterally.   Respiratory:  Normal effort.  Clear to auscultation bilaterally.  Cardiovascular:  Regular rate and rhythm.  No murmurs.  No carotid bruits. Symmetric radial pulses.  Musculoskeletal: Normal station.  Gait steady.  Normal arm swing.  Patient able to walk on heels and toes.  Tandem gait intact.  Romberg negative.  Muscle tone and bulk normal.  Strength is 5/5 in the bilateral upper and lower extremities proximally and distally unless otherwise specified in the neurological exam.  Skin:  No rashes.  Warm, dry, and intact.  Psychiatric:  Good mood.  Normal affect.    Neurologic:  The patient is alert and oriented to person, place and time.  Attention is within normal limits.  Concentration is poor.  Speech is fluent without dysarthria.  The patient is able to name, repeat and follow complex commands without difficulty.  Immediate and delayed recall intact with 3/3 words after 4 minutes.    F-words in a minute: 2  Animals in a minute: 4    Reading: Intact    Left parietal function:   Writing- Intact (sentence scanned in)   Calculations- Intact to how many nickels are in $1; 5-2; 2x4; 78/6 (trouble)   Recognizes own fingers- yes   Can distinguish between right/left- yes    Right parietal function (neglect?):   Clock- intact   Dividing lines- intact (scanned into chart)   Copy drawing- intact    Frontal lobe:   Silhouette pattern of alternating angles and squares- intact   Luria manual sequencing task- some difficulty   Auditory go-no-go test- some difficulty   No grasp reflex    Apraxia:  Able to demonstrate how to brush teeth with right hand and comb hair with left hand.  Can also demonstrate how to strike and match and blow it out and open a can and take a sip.    Logic & abstraction:  Knows how an apple and banana are alike.  Knows how a car and a boat are alike.    Insight: Not sure what she would do if she smelled smoke in a crowded theater    Cranial nerves- Pupils equally round and reactive to light with intact accomodation.  Visual acuity and visual fields intact.  Extraocular movements intact.  Facial sensation intact.  Smile symmetric.  Hearing intact to finger-rub bilaterally.  Palate elevates symmetrically.  SCM and trapezius are 5/5 bilaterally.  Tongue is midline.  Motor-  See musculoskeletal above.  No tremor.  Reflexes- 2+ in the bilateral triceps, biceps, brachioradialis, patellar and achilles.  Toes down-going bilaterally.  Sensation- Intact to pinprick and vibration in bilateral upper and lower extremities symmetrically.  Coordination- Intact to finger to nose and heel knee shin bilaterally.  Intact rapid alternating movements bilaterally.  Gait- See musculoskeletal exam above.     Assessment/Plan:  Ms. Severino is an 84 year old female with a h/o allergies, anxiety, arthritis, headache, HLD, HTN, depression and hypothyroidism who presents today for the evaluation of overall decline starting 3 months ago.  Overall, her neurological exam is ok.  She mainly has issues with concentration.  Maybe some subtle frontal lobe dysfunction.  Since she had a similar experience in the past, depression is the most likely cause.  Seizures or other primary CNS etiologies are on the differential, but much less likely.  A rapidly progressive dementia is also unlikely.  Discussed possibly doing EEG and MRI; however, daughter would like to wait until after neuropsych testing.       Problems Addressed this Visit     None      Visit Diagnoses     Depression, unspecified depression type    -  Primary

## 2019-05-31 ENCOUNTER — TELEPHONE (OUTPATIENT)
Dept: INTERNAL MEDICINE | Facility: CLINIC | Age: 84
End: 2019-05-31

## 2019-05-31 NOTE — TELEPHONE ENCOUNTER
----- Message from Roro Osborn sent at 5/31/2019 10:32 AM EDT -----  Contact: Pt  Pt is calling regarding BP running high  Yesterday 160/92.  Possibly to restart on Losartan.    Pt# [phone]     Has the medication just needs the okay and to talk to Dr CARIAS

## 2019-06-12 ENCOUNTER — HOSPITAL ENCOUNTER (OUTPATIENT)
Dept: NEUROLOGY | Facility: HOSPITAL | Age: 84
Discharge: HOME | End: 2019-06-12
Admitting: PSYCHIATRY & NEUROLOGY

## 2019-06-12 ENCOUNTER — HOSPITAL ENCOUNTER (OUTPATIENT)
Dept: MRI IMAGING | Facility: HOSPITAL | Age: 84
Discharge: HOME | End: 2019-06-12

## 2019-06-12 DIAGNOSIS — R41.3 MEMORY LOSS: ICD-10-CM

## 2019-06-12 PROCEDURE — 95813 EEG EXTND MNTR 61-119 MIN: CPT | Performed by: PSYCHIATRY & NEUROLOGY

## 2019-06-12 PROCEDURE — 70551 MRI BRAIN STEM W/O DYE: CPT

## 2019-06-12 PROCEDURE — 95813 EEG EXTND MNTR 61-119 MIN: CPT

## 2019-06-18 ENCOUNTER — TELEPHONE (OUTPATIENT)
Dept: NEUROLOGY | Facility: CLINIC | Age: 84
End: 2019-06-18

## 2019-06-18 NOTE — TELEPHONE ENCOUNTER
----- Message from Jemima Calabrese MD sent at 6/17/2019 10:35 PM EDT -----  Regarding: EEG  Can you let this patient's daughter know that the patient's EEG was normal?  Thanks!

## 2019-07-16 ENCOUNTER — OFFICE VISIT (OUTPATIENT)
Dept: INTERNAL MEDICINE | Facility: CLINIC | Age: 84
End: 2019-07-16

## 2019-07-16 VITALS
WEIGHT: 133.4 LBS | HEIGHT: 61 IN | DIASTOLIC BLOOD PRESSURE: 82 MMHG | BODY MASS INDEX: 25.19 KG/M2 | SYSTOLIC BLOOD PRESSURE: 143 MMHG

## 2019-07-16 DIAGNOSIS — E03.9 ACQUIRED HYPOTHYROIDISM: Primary | ICD-10-CM

## 2019-07-16 DIAGNOSIS — I10 ESSENTIAL HYPERTENSION: ICD-10-CM

## 2019-07-16 DIAGNOSIS — F33.9 MONOPOLAR DEPRESSION (HCC): ICD-10-CM

## 2019-07-16 DIAGNOSIS — F41.9 ANXIETY: ICD-10-CM

## 2019-07-16 LAB
ALBUMIN SERPL-MCNC: 4.5 G/DL (ref 3.5–5.2)
ALBUMIN/GLOB SERPL: 2 G/DL
ALP SERPL-CCNC: 80 U/L (ref 39–117)
ALT SERPL-CCNC: 13 U/L (ref 1–33)
AST SERPL-CCNC: 16 U/L (ref 1–32)
BILIRUB SERPL-MCNC: 0.4 MG/DL (ref 0.2–1.2)
BUN SERPL-MCNC: 19 MG/DL (ref 8–23)
BUN/CREAT SERPL: 26.8 (ref 7–25)
CALCIUM SERPL-MCNC: 9.8 MG/DL (ref 8.6–10.5)
CHLORIDE SERPL-SCNC: 103 MMOL/L (ref 98–107)
CO2 SERPL-SCNC: 29.3 MMOL/L (ref 22–29)
CREAT SERPL-MCNC: 0.71 MG/DL (ref 0.57–1)
GLOBULIN SER CALC-MCNC: 2.2 GM/DL
GLUCOSE SERPL-MCNC: 92 MG/DL (ref 65–99)
POTASSIUM SERPL-SCNC: 4.3 MMOL/L (ref 3.5–5.2)
PROT SERPL-MCNC: 6.7 G/DL (ref 6–8.5)
SODIUM SERPL-SCNC: 144 MMOL/L (ref 136–145)

## 2019-07-16 PROCEDURE — 99213 OFFICE O/P EST LOW 20 MIN: CPT | Performed by: INTERNAL MEDICINE

## 2019-07-16 PROCEDURE — 36415 COLL VENOUS BLD VENIPUNCTURE: CPT | Performed by: INTERNAL MEDICINE

## 2019-07-16 NOTE — PROGRESS NOTES
"Alanis Severino is a 84 y.o. female here for   Chief Complaint   Patient presents with   • Depression     3 month follow-up   • Hyperlipidemia   • Hypertension   • Hypothyroidism   .    Vitals:    07/16/19 0934   BP: 143/82   BP Location: Left arm   Patient Position: Sitting   Cuff Size: Adult   Weight: 60.5 kg (133 lb 6.4 oz)   Height: 155.6 cm (61.25\")       Body mass index is 25 kg/m².    Depression   Visit Type: follow-up  Patient presents with the following symptoms: nervousness/anxiety (better).  Patient is not experiencing: palpitations and shortness of breath.    Hyperlipidemia   This is a chronic problem. The current episode started more than 1 year ago. Exacerbating diseases include hypothyroidism. Pertinent negatives include no chest pain or shortness of breath.   Hypertension   This is a chronic problem. The current episode started more than 1 year ago. The problem is unchanged. The problem is controlled. Pertinent negatives include no chest pain, palpitations or shortness of breath.   Hypothyroidism   This is a chronic problem. The current episode started more than 1 year ago. The problem has been unchanged. Associated symptoms include fatigue. Pertinent negatives include no chest pain, chills, coughing or fever.        The following portions of the patient's history were reviewed and updated as appropriate: allergies, current medications, past social history and problem list.    Review of Systems   Constitutional: Positive for fatigue. Negative for chills and fever.   Respiratory: Negative for cough, shortness of breath and wheezing.    Cardiovascular: Negative for chest pain, palpitations and leg swelling.   Psychiatric/Behavioral: Positive for dysphoric mood (better). Negative for sleep disturbance. The patient is nervous/anxious (better).        Objective   Physical Exam   Constitutional: She appears well-developed and well-nourished. No distress.   Cardiovascular: Normal rate, regular " rhythm and normal heart sounds.   Pulmonary/Chest: No respiratory distress. She has no wheezes. She has no rales. She exhibits no tenderness.   Musculoskeletal: She exhibits no edema.   Psychiatric: She has a normal mood and affect. Her behavior is normal.   Nursing note and vitals reviewed.      Assessment/Plan   Diagnoses and all orders for this visit:    Acquired hypothyroidism  Comments:  need rechk  Orders:  -     TSH Rfx On Abnormal To Free T4; Future  -     TSH Rfx On Abnormal To Free T4    Essential hypertension  Comments:  on no meds for now - need bp chk weekly & call if over 145/95  Orders:  -     Comprehensive Metabolic Panel; Future  -     Comprehensive Metabolic Panel    Monopolar depression (CMS/HCC)  Comments:  f/u with psych [facility]     Anxiety  Comments:  stable - f/u with psych BC

## 2019-07-17 LAB
T4 FREE SERPL-MCNC: 1.37 NG/DL (ref 0.93–1.7)
TSH SERPL-ACNC: 0.07 MIU/ML (ref 0.27–4.2)

## 2019-10-30 ENCOUNTER — TELEPHONE (OUTPATIENT)
Dept: INTERNAL MEDICINE | Facility: CLINIC | Age: 84
End: 2019-10-30

## 2019-12-17 ENCOUNTER — OFFICE VISIT (OUTPATIENT)
Dept: INTERNAL MEDICINE | Facility: CLINIC | Age: 84
End: 2019-12-17

## 2019-12-17 VITALS
BODY MASS INDEX: 26.47 KG/M2 | HEART RATE: 60 BPM | WEIGHT: 140.2 LBS | DIASTOLIC BLOOD PRESSURE: 78 MMHG | RESPIRATION RATE: 15 BRPM | HEIGHT: 61 IN | SYSTOLIC BLOOD PRESSURE: 134 MMHG | OXYGEN SATURATION: 97 % | TEMPERATURE: 96.7 F

## 2019-12-17 DIAGNOSIS — R92.1 BREAST CALCIFICATION, LEFT: ICD-10-CM

## 2019-12-17 DIAGNOSIS — E03.9 ACQUIRED HYPOTHYROIDISM: ICD-10-CM

## 2019-12-17 DIAGNOSIS — F33.9 MONOPOLAR DEPRESSION (HCC): ICD-10-CM

## 2019-12-17 DIAGNOSIS — F41.9 ANXIETY: ICD-10-CM

## 2019-12-17 DIAGNOSIS — I10 ESSENTIAL HYPERTENSION: ICD-10-CM

## 2019-12-17 DIAGNOSIS — E03.9 HYPOTHYROIDISM, UNSPECIFIED TYPE: Primary | ICD-10-CM

## 2019-12-17 DIAGNOSIS — E78.49 OTHER HYPERLIPIDEMIA: ICD-10-CM

## 2019-12-17 PROCEDURE — G0439 PPPS, SUBSEQ VISIT: HCPCS | Performed by: INTERNAL MEDICINE

## 2019-12-17 PROCEDURE — 96160 PT-FOCUSED HLTH RISK ASSMT: CPT | Performed by: INTERNAL MEDICINE

## 2019-12-17 PROCEDURE — 99214 OFFICE O/P EST MOD 30 MIN: CPT | Performed by: INTERNAL MEDICINE

## 2019-12-17 RX ORDER — LEVOTHYROXINE SODIUM 0.1 MG/1
TABLET ORAL
Qty: 90 TABLET | Refills: 3 | Status: SHIPPED | OUTPATIENT
Start: 2019-12-17 | End: 2020-01-14

## 2019-12-17 RX ORDER — LOSARTAN POTASSIUM 100 MG/1
100 TABLET ORAL DAILY
COMMUNITY
End: 2020-01-27

## 2019-12-17 RX ORDER — ESCITALOPRAM OXALATE 10 MG/1
TABLET ORAL
Qty: 135 TABLET | Refills: 3 | Status: SHIPPED | OUTPATIENT
Start: 2019-12-17 | End: 2020-08-28

## 2019-12-17 NOTE — PATIENT INSTRUCTIONS
Medicare Wellness  Personal Prevention Plan of Service     Date of Office Visit:  2019  Encounter Provider:  Lluvia Liz MD  Place of Service:  [facility] INTER[facility]  Patient Name: Kajal Severino  :  1935    As part of the Medicare Wellness portion of your visit today, we are providing you with this personalized preventive plan of services (PPPS). This plan is based upon recommendations of the [place] Preventive Services Task Force (USPSTF) and the Advisory Committee on Immunization Practices (ACIP).    This lists the preventive care services that should be considered, and provides dates of when you are due. Items listed as completed are up-to-date and do not require any further intervention.    Health Maintenance   Topic Date Due   • ZOSTER VACCINE (2 of 3) 2020 (Originally 2013)   • LIPID PANEL  2020   • DXA SCAN  2020   • MEDICARE ANNUAL WELLNESS  2020   • MAMMOGRAM  2021   • TDAP/TD VACCINES (2 - Td) 2023   • INFLUENZA VACCINE  Completed   • PNEUMOCOCCAL VACCINES (65+ LOW/MEDIUM RISK)  Completed       Orders Placed This Encounter   Procedures   • US Breast Left Complete     Standing Status:   Future     Standing Expiration Date:   2020     Order Specific Question:   Reason for Exam:     Answer:   calcifications       No follow-ups on file.

## 2019-12-17 NOTE — PROGRESS NOTES
The ABCs of the Annual Wellness Visit  Subsequent Medicare Wellness Visit    Chief Complaint   Patient presents with   • Medicare Wellness-subsequent   • Depression   • Hyperlipidemia   • Hypertension   • Hypothyroidism       Subjective   History of Present Illness:  Kajal Severino is a 84 y.o. female who presents for a Subsequent Medicare Wellness Visit.    HEALTH RISK ASSESSMENT    Recent Hospitalizations:  No hospitalization(s) within the last year.    Current Medical Providers:  Patient Care Team:  Lluvia Liz MD as PCP - General  Lluvia Liz MD as PCP - Family Medicine  Danny Borjas MD as Consulting Physician (Orthopedic Surgery)  Lluvia Liz MD as Referring Physician (Internal Medicine)  Radha Scruggs MD as Consulting Physician (Hematology and Oncology)  Jemima Calabrese MD as Consulting Physician (Neurology)    Smoking Status:  Social History     Tobacco Use   Smoking Status Former Smoker   • Last attempt to quit:    • Years since quittin.9   Smokeless Tobacco Never Used       Alcohol Consumption:  Social History     Substance and Sexual Activity   Alcohol Use No       Depression Screen:   PHQ-2/PHQ-9 Depression Screening 2019   Little interest or pleasure in doing things 0   Feeling down, depressed, or hopeless 0   Trouble falling or staying asleep, or sleeping too much 0   Feeling tired or having little energy 0   Poor appetite or overeating 0   Feeling bad about yourself - or that you are a failure or have let yourself or your family down 0   Trouble concentrating on things, such as reading the newspaper or watching television 0   Moving or speaking so slowly that other people could have noticed. Or the opposite - being so fidgety or restless that you have been moving around a lot more than usual 0   Thoughts that you would be better off dead, or of hurting yourself in some way 0   Total Score 0   If you checked off any problems, how difficult have  these problems made it for you to do your work, take care of things at home, or get along with other people? -       Fall Risk Screen:  RC Fall Risk Assessment has not been completed.    Health Habits and Functional and Cognitive Screening:  Functional & Cognitive Status 12/17/2019   Do you have difficulty preparing food and eating? No   Do you have difficulty bathing yourself, getting dressed or grooming yourself? No   Do you have difficulty using the toilet? No   Do you have difficulty moving around from place to place? Yes   Do you have trouble with steps or getting out of a bed or a chair? No   Current Diet -   Dental Exam -   Eye Exam -   Exercise (times per week) -   Current Exercise Activities Include -   Do you need help using the phone?  No   Are you deaf or do you have serious difficulty hearing?  No   Do you need help with transportation? Yes   Do you need help shopping? No   Do you need help preparing meals?  No   Do you need help with housework?  No   Do you need help with laundry? No   Do you need help taking your medications? No   Do you need help managing money? No   Do you ever drive or ride in a car without wearing a seat belt? No   Have you felt unusual stress, anger or loneliness in the last month? -   Who do you live with? Child   If you need help, do you have trouble finding someone available to you? No   Have you been bothered in the last four weeks by sexual problems? No   Do you have difficulty concentrating, remembering or making decisions? -         Does the patient have evidence of cognitive impairment? No    Asprin use counseling:Does not need ASA (and currently is not on it)    Age-appropriate Screening Schedule:  Refer to the list below for future screening recommendations based on patient's age, sex and/or medical conditions. Orders for these recommended tests are listed in the plan section. The patient has been provided with a written plan.    Health Maintenance   Topic Date Due    • INFLUENZA VACCINE  08/01/2019   • ZOSTER VACCINE (2 of 3) 04/27/2020 (Originally 5/13/2013)   • LIPID PANEL  03/22/2020   • DXA SCAN  12/13/2020   • MAMMOGRAM  01/23/2021   • TDAP/TD VACCINES (2 - Td) 03/18/2023   • PNEUMOCOCCAL VACCINES (65+ LOW/MEDIUM RISK)  Completed          The following portions of the patient's history were reviewed and updated as appropriate: allergies, current medications, past family history, past medical history, past social history, past surgical history and problem list.    Outpatient Medications Prior to Visit   Medication Sig Dispense Refill   • escitalopram (LEXAPRO) 10 MG tablet Take 1 tablet by mouth Daily.  3   • fluticasone (FLONASE) 50 MCG/ACT nasal spray USE 2 SPRAYS IN EACH NOSTRIL EVERY DAY 16 mL 3   • levothyroxine (SYNTHROID) 100 MCG tablet 1 daily 90 tablet 2   • Loratadine (CLARITIN PO) Take  by mouth.     • melatonin 5 MG tablet tablet Take 5 mg by mouth Every Night.     • acetaminophen-codeine (TYLENOL #3) 300-30 MG per tablet 1-2 pills PO Q6 hours as needed for pain 40 tablet 0     No facility-administered medications prior to visit.        Patient Active Problem List   Diagnosis   • Hypertension   • Hyperlipidemia   • History of colonic polyps   • Hypothyroidism   • Allergic rhinitis   • Back pain, lumbosacral   • Osteoarthritis   • Vitamin D deficiency   • Acute right ankle pain   • Right leg pain   • Right lumbar radiculopathy   • Primary insomnia   • Bee sting allergy   • Anxiety   • Monopolar depression (CMS/[facility])       Advanced Care Planning:  Patient has an advance directive - a copy has been provided and is visible in patient header    Review of Systems    Compared to one year ago, the patient feels her physical health is the same.  Compared to one year ago, the patient feels her mental health is the same.    Reviewed chart for potential of high risk medication in the elderly: not applicable  Reviewed chart for potential of harmful drug interactions in the  "elderly:not applicable    Objective         Vitals:    12/17/19 1125   Weight: 63.6 kg (140 lb 3.2 oz)   Height: 155.6 cm (61.25\")   PainSc: 0-No pain       Body mass index is 26.27 kg/m².  Discussed the patient's BMI with her. The BMI is above average; BMI management plan is completed.    Physical Exam          Assessment/Plan   Medicare Risks and Personalized Health Plan  CMS Preventative Services Quick Reference  Breast Cancer/Mammogram Screening  Depression/Dysphoria  Fall Risk  Immunizations Discussed/Encouraged (specific immunizations; Shingrix )  Inactivity/Sedentary  Obesity/Overweight     The above risks/problems have been discussed with the patient.  Pertinent information has been shared with the patient in the After Visit Summary.  Follow up plans and orders are seen below in the Assessment/Plan Section.    There are no diagnoses linked to this encounter.  Follow Up:  No follow-ups on file.     An After Visit Summary and PPPS were given to the patient.             "

## 2019-12-17 NOTE — PROGRESS NOTES
"Alanis Severino is a 84 y.o. female here for   Chief Complaint   Patient presents with   • Medicare Wellness-subsequent   • Depression   • Hyperlipidemia   • Hypertension   • Hypothyroidism   .    Vitals:    12/17/19 1125 12/17/19 1210   BP: 138/90 134/78   BP Location: Left arm    Patient Position: Sitting    Cuff Size: Adult    Pulse: 60    Resp: 15    Temp: 96.7 °F (35.9 °C)    TempSrc: Temporal    SpO2: 97%    Weight: 63.6 kg (140 lb 3.2 oz)    Height: 155.6 cm (61.25\")        Body mass index is 26.27 kg/m².    Depression   Visit Type: follow-up  Patient presents with the following symptoms: depressed mood, excessive worry, fatigue, malaise and nervousness/anxiety.  Patient is not experiencing: chest pain, feelings of hopelessness, palpitations, panic, shortness of breath and suicidal planning.  Frequency of symptoms: constantly     Hyperlipidemia   This is a chronic problem. The current episode started more than 1 year ago. The problem is controlled. Recent lipid tests were reviewed and are normal. Exacerbating diseases include hypothyroidism. She has no history of diabetes. Pertinent negatives include no chest pain or shortness of breath.   Hypertension   This is a chronic problem. The current episode started more than 1 year ago. The problem is unchanged. The problem is controlled. Associated symptoms include headaches (not worse). Pertinent negatives include no chest pain, palpitations or shortness of breath.   Hypothyroidism   This is a chronic problem. The current episode started more than 1 year ago. The problem occurs constantly. The problem has been unchanged. Associated symptoms include fatigue and headaches (not worse). Pertinent negatives include no chest pain, chills, coughing or fever.        The following portions of the patient's history were reviewed and updated as appropriate: allergies, current medications, past social history and problem list.    Review of Systems   Constitutional: " Positive for fatigue. Negative for chills and fever.   Respiratory: Negative for cough, shortness of breath and wheezing.    Cardiovascular: Negative for chest pain, palpitations and leg swelling.   Neurological: Positive for headaches (not worse).   Psychiatric/Behavioral: Positive for dysphoric mood and sleep disturbance. The patient is nervous/anxious.        Objective   Physical Exam   Constitutional: She appears well-developed and well-nourished. No distress.   Cardiovascular: Normal rate and regular rhythm.   Murmur heard.   Systolic murmur is present with a grade of 2/6.  Pulmonary/Chest: No respiratory distress. She has no wheezes. She has no rales. She exhibits no tenderness.   Musculoskeletal: She exhibits no edema.   Psychiatric: She has a normal mood and affect. Her behavior is normal.   Nursing note and vitals reviewed.      Assessment/Plan   Diagnoses and all orders for this visit:    Hypothyroidism, unspecified type  Comments:  continue synthroid 100mcg daily  Orders:  -     levothyroxine (SYNTHROID) 100 MCG tablet; 1 daily    Breast calcification, left  Comments:  need u/s (overdue) - also needing bilat mammo in next mo  Orders:  -     US Breast Left Complete; Future    Essential hypertension  Comments:  controlled -call if bp over 140/90  Orders:  -     losartan (COZAAR) 100 MG tablet; Take 100 mg by mouth Daily.  -     CBC Auto Differential; Future  -     Comprehensive Metabolic Panel; Future  -     Lipid Panel; Future  -     Urinalysis With Microscopic If Indicated (No Culture) - Urine, Clean Catch; Future    Other hyperlipidemia  Comments:  need diet/ex  Orders:  -     Comprehensive Metabolic Panel; Future  -     Lipid Panel; Future    Acquired hypothyroidism  Comments:  need chk fasting   Orders:  -     TSH Rfx On Abnormal To Free T4; Future    Anxiety  Comments:  trial of incr lexapro  Orders:  -     escitalopram (LEXAPRO) 10 MG tablet; 1.5 tab daily    Monopolar depression  (CMS/[facility])  Comments:  continue counseling - trial of higher dose lexapro - call if problems  Orders:  -     escitalopram (LEXAPRO) 10 MG tablet; 1.5 tab daily          Wellness today.   Need daily strengthening & balance exercises (shown today).   Need screening for AAA & carotid disease.  Information given today.

## 2019-12-27 ENCOUNTER — APPOINTMENT (OUTPATIENT)
Dept: WOMENS IMAGING | Facility: HOSPITAL | Age: 84
End: 2019-12-27

## 2019-12-27 PROCEDURE — 76641 ULTRASOUND BREAST COMPLETE: CPT | Performed by: RADIOLOGY

## 2020-01-08 ENCOUNTER — LAB (OUTPATIENT)
Dept: INTERNAL MEDICINE | Facility: CLINIC | Age: 85
End: 2020-01-08

## 2020-01-08 DIAGNOSIS — I10 ESSENTIAL HYPERTENSION: Primary | ICD-10-CM

## 2020-01-08 DIAGNOSIS — E03.9 ACQUIRED HYPOTHYROIDISM: ICD-10-CM

## 2020-01-08 DIAGNOSIS — R82.90 ABNORMAL URINE FINDINGS: ICD-10-CM

## 2020-01-08 DIAGNOSIS — E78.49 OTHER HYPERLIPIDEMIA: ICD-10-CM

## 2020-01-08 LAB
ALBUMIN SERPL-MCNC: 4.1 G/DL (ref 3.5–5.2)
ALBUMIN/GLOB SERPL: 1.5 G/DL
ALP SERPL-CCNC: 84 U/L (ref 39–117)
ALT SERPL-CCNC: 13 U/L (ref 1–33)
AST SERPL-CCNC: 15 U/L (ref 1–32)
BACTERIA UR QL AUTO: ABNORMAL /HPF
BASOPHILS # BLD AUTO: 0.03 10*3/MM3 (ref 0–0.2)
BASOPHILS NFR BLD AUTO: 0.5 % (ref 0–1.5)
BILIRUB SERPL-MCNC: 0.4 MG/DL (ref 0.2–1.2)
BILIRUB UR QL STRIP: NEGATIVE
BUN SERPL-MCNC: 19 MG/DL (ref 8–23)
BUN/CREAT SERPL: 28.4 (ref 7–25)
CALCIUM SERPL-MCNC: 9.4 MG/DL (ref 8.6–10.5)
CHLORIDE SERPL-SCNC: 104 MMOL/L (ref 98–107)
CHOLEST SERPL-MCNC: 176 MG/DL (ref 0–200)
CLARITY UR: ABNORMAL
CO2 SERPL-SCNC: 27.4 MMOL/L (ref 22–29)
COLOR UR: YELLOW
CREAT SERPL-MCNC: 0.67 MG/DL (ref 0.57–1)
EOSINOPHIL # BLD AUTO: 0.11 10*3/MM3 (ref 0–0.4)
EOSINOPHIL # BLD AUTO: 2 % (ref 0.3–6.2)
ERYTHROCYTE [DISTWIDTH] IN BLOOD BY AUTOMATED COUNT: 11.8 % (ref 12.3–15.4)
GLOBULIN SER CALC-MCNC: 2.7 GM/DL
GLUCOSE SERPL-MCNC: 102 MG/DL (ref 65–99)
GLUCOSE UR STRIP-MCNC: NEGATIVE MG/DL
HCT VFR BLD AUTO: 40.2 % (ref 34–46.6)
HDLC SERPL-MCNC: 56 MG/DL (ref 40–60)
HGB BLD-MCNC: 13.3 G/DL (ref 12–15.9)
HGB UR QL STRIP.AUTO: NEGATIVE
HYALINE CASTS UR QL AUTO: ABNORMAL /LPF
IMM GRANULOCYTES # BLD: 0.01 10*3/MM3 (ref 0–0.05)
IMM GRANULOCYTES NFR BLD: 0.2 % (ref 0–0.5)
KETONES UR QL STRIP: NEGATIVE
LDLC SERPL CALC-MCNC: 92 MG/DL (ref 0–100)
LEUKOCYTE ESTERASE UR QL STRIP.AUTO: ABNORMAL
LYMPHOCYTES # BLD AUTO: 1.41 10*3/MM3 (ref 0.7–3.1)
LYMPHOCYTES NFR BLD AUTO: 25 % (ref 19.6–45.3)
MCH RBC QN AUTO: 30.1 PG (ref 26.6–33)
MCHC RBC AUTO-ENTMCNC: 33.1 G/DL (ref 31.5–35.7)
MCV RBC AUTO: 91 FL (ref 79–97)
MONOCYTES # BLD AUTO: 0.54 10*3/MM3 (ref 0.1–0.9)
MONOCYTES NFR BLD AUTO: 9.6 % (ref 5–12)
MUCOUS THREADS URNS QL MICRO: ABNORMAL /HPF
NEUTROPHILS # BLD AUTO: 3.54 10*3/MM3 (ref 1.7–7)
NEUTROPHILS NFR BLD AUTO: 62.7 % (ref 42.7–76)
NITRITE UR QL STRIP: POSITIVE
NRBC BLD AUTO-RTO: 0 /100 WBC (ref 0–0.2)
PH UR STRIP.AUTO: 5.5 [PH] (ref 5–8)
PLATELET # BLD AUTO: 211 10*3/MM3 (ref 140–450)
POTASSIUM SERPL-SCNC: 4.3 MMOL/L (ref 3.5–5.2)
PROT SERPL-MCNC: 6.8 G/DL (ref 6–8.5)
PROT UR QL STRIP: NEGATIVE
RBC # BLD AUTO: 4.42 10*6/MM3 (ref 3.77–5.28)
RBC # UR: ABNORMAL /HPF
REF LAB TEST METHOD: ABNORMAL
SODIUM SERPL-SCNC: 144 MMOL/L (ref 136–145)
SP GR UR STRIP: 1.02 (ref 1–1.03)
SQUAMOUS #/AREA URNS HPF: ABNORMAL /HPF
TRIGL SERPL-MCNC: 142 MG/DL (ref 0–150)
UROBILINOGEN UR QL STRIP: ABNORMAL
VLDLC SERPL-MCNC: 28.4 MG/DL
WBC # BLD AUTO: 5.64 10*3/MM3 (ref 3.4–10.8)
WBC UR QL AUTO: ABNORMAL /HPF

## 2020-01-08 PROCEDURE — 81001 URINALYSIS AUTO W/SCOPE: CPT | Performed by: INTERNAL MEDICINE

## 2020-01-08 PROCEDURE — 36415 COLL VENOUS BLD VENIPUNCTURE: CPT | Performed by: INTERNAL MEDICINE

## 2020-01-09 LAB
T4 FREE SERPL-MCNC: 1.17 NG/DL (ref 0.93–1.7)
TSH SERPL-ACNC: 0.1 UIU/ML (ref 0.27–4.2)

## 2020-01-12 LAB
BACTERIA UR CULT: ABNORMAL
Lab: ABNORMAL
SUSCEPTIBILITY TESTING: ABNORMAL

## 2020-01-13 ENCOUNTER — TELEPHONE (OUTPATIENT)
Dept: INTERNAL MEDICINE | Facility: CLINIC | Age: 85
End: 2020-01-13

## 2020-01-13 NOTE — TELEPHONE ENCOUNTER
Pt's daughter called and stated that pt is no longer experiencing symptoms of a UTI. Please call Keena at [phone].

## 2020-01-13 NOTE — TELEPHONE ENCOUNTER
I spoke with tyrell regarding urine culture with infection.  She reports no UTI symptoms for her mother (urgency, frequency,burning confusion, etc). She will call if any changes and will start antibiotic.

## 2020-01-14 DIAGNOSIS — E03.9 HYPOTHYROIDISM, UNSPECIFIED TYPE: ICD-10-CM

## 2020-01-14 RX ORDER — LEVOTHYROXINE SODIUM 0.1 MG/1
TABLET ORAL
Qty: 90 TABLET | Refills: 3 | Status: SHIPPED | OUTPATIENT
Start: 2020-01-14 | End: 2021-01-21

## 2020-01-26 DIAGNOSIS — I10 ESSENTIAL HYPERTENSION: ICD-10-CM

## 2020-01-27 RX ORDER — LOSARTAN POTASSIUM 100 MG/1
TABLET ORAL
Qty: 90 TABLET | Refills: 3 | Status: SHIPPED | OUTPATIENT
Start: 2020-01-27 | End: 2021-02-03

## 2020-06-01 ENCOUNTER — CONSULT (OUTPATIENT)
Dept: ORTHOPEDIC SURGERY | Facility: CLINIC | Age: 85
End: 2020-06-01

## 2020-06-01 VITALS — WEIGHT: 145 LBS | BODY MASS INDEX: 26.68 KG/M2 | TEMPERATURE: 97.2 F | HEIGHT: 62 IN

## 2020-06-01 DIAGNOSIS — M25.562 LEFT KNEE PAIN, UNSPECIFIED CHRONICITY: Primary | ICD-10-CM

## 2020-06-01 PROCEDURE — 73562 X-RAY EXAM OF KNEE 3: CPT | Performed by: NURSE PRACTITIONER

## 2020-06-01 PROCEDURE — 99212 OFFICE O/P EST SF 10 MIN: CPT | Performed by: NURSE PRACTITIONER

## 2020-06-04 PROCEDURE — 20610 DRAIN/INJ JOINT/BURSA W/O US: CPT | Performed by: NURSE PRACTITIONER

## 2020-06-04 RX ORDER — METHYLPREDNISOLONE ACETATE 80 MG/ML
80 INJECTION, SUSPENSION INTRA-ARTICULAR; INTRALESIONAL; INTRAMUSCULAR; SOFT TISSUE
Status: COMPLETED | OUTPATIENT
Start: 2020-06-04 | End: 2020-06-04

## 2020-06-04 RX ADMIN — METHYLPREDNISOLONE ACETATE 80 MG: 80 INJECTION, SUSPENSION INTRA-ARTICULAR; INTRALESIONAL; INTRAMUSCULAR; SOFT TISSUE at 09:07

## 2020-06-04 NOTE — PROGRESS NOTES
"Patient: Kajal Severino  YOB: 1935 85 y.o. female  Medical Record Number: [id number]    Chief Complaints:   Chief Complaint   Patient presents with   • Left Knee - Pain, [facility]       History of Present Illness:Kajal Severino is a 85 y.o. female who presents with new complaint of left knee pain.  The patient reports she has had increased knee pain off and on for the last several months.  Denies any specific injury.  Describes the knee pain as a moderate constant ache worse with prolonged standing walking, better with rest.    Allergies: No Known Allergies    Medications:   Current Outpatient Medications   Medication Sig Dispense Refill   • escitalopram (LEXAPRO) 10 MG tablet 1.5 tab daily 135 tablet 3   • etodolac (LODINE) 400 MG tablet Take 1 tablet by mouth 2 (Two) Times a Day. 30 tablet 0   • fluticasone (FLONASE) 50 MCG/ACT nasal spray USE 2 SPRAYS IN EACH NOSTRIL EVERY DAY 16 mL 3   • levothyroxine (SYNTHROID, LEVOTHROID) 100 MCG tablet TAKE 1 TABLET BY MOUTH DAILY 90 tablet 3   • Loratadine (CLARITIN PO) Take  by mouth.     • losartan (COZAAR) 100 MG tablet TAKE 1 TABLET BY MOUTH DAILY 90 tablet 3   • melatonin 5 MG tablet tablet Take 5 mg by mouth Every Night.       No current facility-administered medications for this visit.          The following portions of the patient's history were reviewed and updated as appropriate: allergies, current medications, past family history, past medical history, past social history, past surgical history and problem list.    Review of Systems:   A 14 point review of systems was performed. All systems negative except pertinent positives/negative listed in HPI above    Physical Exam:   Vitals:    06/01/20 1045   Temp: 97.2 °F (36.2 °C)   Weight: 65.8 kg (145 lb)   Height: 157.5 cm (62\")   PainSc:   9       General: A and O x 3, ASA, NAD    SCLERA:    Normal    DENTITION:   Normal  Skin clear no unusual lesions noted  Left knee the patient has no appreciable effusion " limited range of motion secondary to pain with 110 degrees flexion neutral and extension with a positive Samantha negative Lockman calf soft and nontender    Radiology:  Xrays 3views (ap,lateral, sunrise) left knee were ordered and reviewed today secondary to pain and show significant arthritic changes no compared to views available    Assessment/Plan:  Osteoarthritis left knee    Patient discussed treatment options.  She would like to proceed with left knee cortisone injection.  Prior to injection risks were discussed including pain infection.  Patient verbalized understanding would like to proceed with injection she will continue with Tylenol as needed and we will see her back as needed    Large Joint Arthrocentesis: L knee  Date/Time: 6/4/2020 9:07 AM  Consent given by: patient  Site marked: site marked  Timeout: Immediately prior to procedure a time out was called to verify the correct patient, procedure, equipment, support staff and site/side marked as required   Supporting Documentation  Indications: pain and joint swelling   Procedure Details  Location: knee - L knee  Preparation: Patient was prepped and draped in the usual sterile fashion  Needle size: 22 G  Approach: anterolateral  Medications administered: 80 mg methylPREDNISolone acetate 80 MG/ML; 2 mL lidocaine (cardiac)  Patient tolerance: patient tolerated the procedure well with no immediate complications

## 2020-08-28 ENCOUNTER — OFFICE VISIT (OUTPATIENT)
Dept: INTERNAL MEDICINE | Facility: CLINIC | Age: 85
End: 2020-08-28

## 2020-08-28 ENCOUNTER — APPOINTMENT (OUTPATIENT)
Dept: WOMENS IMAGING | Facility: HOSPITAL | Age: 85
End: 2020-08-28

## 2020-08-28 VITALS
OXYGEN SATURATION: 97 % | DIASTOLIC BLOOD PRESSURE: 90 MMHG | TEMPERATURE: 98.1 F | SYSTOLIC BLOOD PRESSURE: 140 MMHG | HEART RATE: 88 BPM | WEIGHT: 154 LBS | BODY MASS INDEX: 28.34 KG/M2 | HEIGHT: 62 IN

## 2020-08-28 DIAGNOSIS — G89.29 CHRONIC BILATERAL LOW BACK PAIN WITH RIGHT-SIDED SCIATICA: Primary | ICD-10-CM

## 2020-08-28 DIAGNOSIS — M54.41 CHRONIC BILATERAL LOW BACK PAIN WITH RIGHT-SIDED SCIATICA: Primary | ICD-10-CM

## 2020-08-28 DIAGNOSIS — I10 ESSENTIAL HYPERTENSION: ICD-10-CM

## 2020-08-28 DIAGNOSIS — M79.604 PAIN OF RIGHT LOWER EXTREMITY: ICD-10-CM

## 2020-08-28 DIAGNOSIS — E03.9 ACQUIRED HYPOTHYROIDISM: ICD-10-CM

## 2020-08-28 DIAGNOSIS — G89.29 HIP PAIN, CHRONIC, RIGHT: ICD-10-CM

## 2020-08-28 DIAGNOSIS — M25.551 HIP PAIN, CHRONIC, RIGHT: ICD-10-CM

## 2020-08-28 PROCEDURE — 72110 X-RAY EXAM L-2 SPINE 4/>VWS: CPT | Performed by: INTERNAL MEDICINE

## 2020-08-28 PROCEDURE — 99214 OFFICE O/P EST MOD 30 MIN: CPT | Performed by: INTERNAL MEDICINE

## 2020-08-28 PROCEDURE — 72110 X-RAY EXAM L-2 SPINE 4/>VWS: CPT | Performed by: RADIOLOGY

## 2020-08-28 NOTE — PROGRESS NOTES
"Alanis Severino is a 85 y.o. female here for   Chief Complaint   Patient presents with   • Leg Pain   • Back Pain   .    Vitals:    08/28/20 1500 08/28/20 1817   BP: 164/100 140/90   Pulse: 88    Temp: 98.1 °F (36.7 °C)    SpO2: 97%    Weight: 69.9 kg (154 lb)    Height: 157.5 cm (62.01\")        Body mass index is 28.16 kg/m².    Leg Pain    The incident occurred more than 1 week ago. There was no injury mechanism. The pain is present in the right ankle and right leg. The quality of the pain is described as burning and aching. The pain is severe. The pain has been constant since onset. Pertinent negatives include no numbness or tingling. She reports no foreign bodies present. She has tried heat, ice and NSAIDs for the symptoms. The treatment provided no relief.   Back Pain   Associated symptoms include leg pain. Pertinent negatives include no chest pain, fever, numbness or tingling.        The following portions of the patient's history were reviewed and updated as appropriate: allergies, current medications, past social history and problem list.    Review of Systems   Constitutional: Negative for chills, fatigue and fever.   Respiratory: Negative for cough, shortness of breath and wheezing.    Cardiovascular: Negative for chest pain, palpitations and leg swelling.   Musculoskeletal: Positive for arthralgias (right shin pain) and back pain.   Neurological: Negative for tingling and numbness.   Psychiatric/Behavioral: Positive for dysphoric mood (better). The patient is nervous/anxious.        Objective   Physical Exam   Constitutional: She appears well-developed and well-nourished. No distress.   Cardiovascular: Normal rate, regular rhythm and normal heart sounds.   Pulmonary/Chest: No respiratory distress. She has no wheezes. She has no rales. She exhibits no tenderness.   Musculoskeletal: Normal range of motion. She exhibits no edema or deformity.   Neurological: She is alert. No cranial nerve deficit. "   Skin: Skin is warm. No rash noted.   Psychiatric: She has a normal mood and affect. Her behavior is normal.   Nursing note and vitals reviewed.    Pain with flexion & extension of low back.  Pain with palpation of right upper lateral thigh (R).    Assessment/Plan   Diagnoses and all orders for this visit:    Chronic bilateral low back pain with right-sided sciatica  Comments:  Scoliosis and arthritis of the low back-need MRI with potential epidural injections.  Orders:  -     XR Spine Lumbar Complete 4+VW  -     MRI Lumbar Spine Without Contrast; Future    Pain of right lower extremity  Comments:  Possible right trochanteric bursitis-may apply cool compresses-refer to orthopedic.  Orders:  -     MRI Lumbar Spine Without Contrast; Future    Essential hypertension  Comments:  Controlled-call if blood pressures over 140/90.    Acquired hypothyroidism    Hip pain, chronic, right  Comments:  Refer to orthopedic.  Orders:  -     Ambulatory Referral to Orthopedic Surgery

## 2020-08-31 NOTE — TELEPHONE ENCOUNTER
sure   Quality 226: Preventive Care And Screening: Tobacco Use: Screening And Cessation Intervention: Tobacco Screening not Performed for Medical Reasons Detail Level: Detailed Quality 130: Documentation Of Current Medications In The Medical Record: Current Medications Documented Quality 431: Preventive Care And Screening: Unhealthy Alcohol Use - Screening: Patient screened for unhealthy alcohol use using a single question and scores less than 2 times per year

## 2020-09-10 ENCOUNTER — TELEPHONE (OUTPATIENT)
Dept: INTERNAL MEDICINE | Facility: CLINIC | Age: 85
End: 2020-09-10

## 2020-09-10 DIAGNOSIS — M54.50 BACK PAIN, LUMBOSACRAL: Primary | ICD-10-CM

## 2020-09-10 RX ORDER — TRAMADOL HYDROCHLORIDE 50 MG/1
50 TABLET ORAL EVERY 6 HOURS PRN
Qty: 120 TABLET | Refills: 1 | Status: SHIPPED | OUTPATIENT
Start: 2020-09-10 | End: 2020-12-18

## 2020-09-10 NOTE — TELEPHONE ENCOUNTER
Left detailed message for patient, she needs to contact the hospital and ask for the radiology department as they will be able to assist with the disc copy as we are unable to do so here in the office

## 2020-10-06 ENCOUNTER — OFFICE VISIT (OUTPATIENT)
Dept: ORTHOPEDIC SURGERY | Facility: CLINIC | Age: 85
End: 2020-10-06

## 2020-10-06 VITALS — BODY MASS INDEX: 28.16 KG/M2 | WEIGHT: 153 LBS | HEIGHT: 62 IN | TEMPERATURE: 97.7 F

## 2020-10-06 DIAGNOSIS — M54.16 RIGHT LUMBAR RADICULOPATHY: ICD-10-CM

## 2020-10-06 DIAGNOSIS — M54.50 LUMBAR SPINE PAIN: Primary | ICD-10-CM

## 2020-10-06 PROCEDURE — 99213 OFFICE O/P EST LOW 20 MIN: CPT | Performed by: ORTHOPAEDIC SURGERY

## 2020-10-06 PROCEDURE — 72100 X-RAY EXAM L-S SPINE 2/3 VWS: CPT | Performed by: ORTHOPAEDIC SURGERY

## 2020-10-06 RX ORDER — IBUPROFEN 200 MG
200 TABLET ORAL 2 TIMES DAILY
COMMUNITY

## 2020-10-06 RX ORDER — SENNOSIDES 8.6 MG
650 CAPSULE ORAL EVERY 8 HOURS PRN
COMMUNITY

## 2020-10-06 NOTE — PROGRESS NOTES
New patient or new problem visit    Chief Complaint   Patient presents with   • Lumbar Spine - Establish Care       HPI: She complains of low back pain which radiates the right lower extremity predominantly the former.  Pain is been going on about 8 months severe constant aching pain's been doing a lot of walking.  Ibuprofen and tramadol helps somewhat.  She notes some pain along the lateral aspect of the leg.    PFSH: See chart- reviewed    Review of Systems   Constitutional: Positive for activity change and appetite change. Negative for chills, fever and unexpected weight change.   HENT: Positive for dental problem, postnasal drip and sinus pain. Negative for trouble swallowing and voice change.    Eyes: Negative for visual disturbance.   Respiratory: Negative for cough and shortness of breath.    Cardiovascular: Negative for chest pain and leg swelling.   Gastrointestinal: Negative for abdominal pain, nausea and vomiting.   Endocrine: Negative for cold intolerance and heat intolerance.   Genitourinary: Negative for difficulty urinating, frequency and urgency.   Musculoskeletal: Positive for arthralgias, back pain and gait problem.        Muscle aches   Skin: Negative for rash and wound.   Allergic/Immunologic: Negative for immunocompromised state.   Neurological: Positive for weakness and headaches. Negative for numbness.   Hematological: Does not bruise/bleed easily.   Psychiatric/Behavioral: Positive for decreased concentration. Negative for dysphoric mood. The patient is not nervous/anxious.         Sadness       PE: Constitutional: Vital signs above-noted.  Awake, alert and oriented    Psychiatric: Affect and insight do not appear grossly disturbed.    Pulmonary: Breathing is unlabored, color is good.    Skin: Warm, dry and normal turgor    Cardiac: Pedal pulses intact.  No edema.    Eyesight and hearing appear adequate for examination purposes      Musculoskeletal:  There is intimal tenderness to percussion  and palpation of the spine. Motion appears undisturbed.  Posture is unremarkable to coronal and sagittal inspection.    The skin about the area is intact.  There is no palpable or visible deformity.  There is no local spasm.       Neurologic:   Reflexes are 2+ and symmetrical in the patellae and left Achilles and absent in the Achilles on the right.   Motor function is undisturbed in quadriceps, EHL, and gastrocnemius on the left but 4/5 weakness in the right EHL   sensation appears symmetrically intact to light touch.  In the bilateral lower extremities there is no evidence of atrophy.   Clonus is absent..  Gait appears undisturbed.  SLR test negative      MEDICAL DECISION MAKING    XRAY: MRI of the lumbar spine shows multilevel foraminal stenotic changes in the right L2-3 herniated disc.  Plain film x-rays today demonstrate L2-3 translation and disc degeneration slight scoliosis and L5-S1 spondylolisthesis.  No comparison views are available    Other: n/a    Impression: Lumbar spinal stenosis lumbar spondylolisthesis    Plan: For now lumbar epidural injections if she fails to improve we will see her back.

## 2020-10-08 ENCOUNTER — TELEPHONE (OUTPATIENT)
Dept: ORTHOPEDIC SURGERY | Facility: CLINIC | Age: 85
End: 2020-10-08

## 2020-10-08 DIAGNOSIS — M54.41 CHRONIC RIGHT-SIDED LOW BACK PAIN WITH RIGHT-SIDED SCIATICA: Primary | ICD-10-CM

## 2020-10-08 DIAGNOSIS — G89.29 CHRONIC RIGHT-SIDED LOW BACK PAIN WITH RIGHT-SIDED SCIATICA: Primary | ICD-10-CM

## 2020-10-08 RX ORDER — METHYLPREDNISOLONE 4 MG/1
TABLET ORAL
Qty: 21 TABLET | Refills: 0 | Status: SHIPPED | OUTPATIENT
Start: 2020-10-08 | End: 2020-11-20

## 2020-10-08 RX ORDER — HYDROCODONE BITARTRATE AND ACETAMINOPHEN 7.5; 325 MG/1; MG/1
TABLET ORAL
Qty: 30 TABLET | Refills: 0 | Status: SHIPPED | OUTPATIENT
Start: 2020-10-08 | End: 2021-02-18

## 2020-10-08 NOTE — TELEPHONE ENCOUNTER
Spoke with Kenea and she was given the phone number to centralized scheduling. She was call to set up the epidurals.

## 2020-10-08 NOTE — TELEPHONE ENCOUNTER
PATIENTS DAUGHTER CALLED CONCERNING EPIDURAL APPT. SHE NEEDS TO CONFIRM LOCATION AND TIME.      ETHAN OLVERA MAY BE REACHED AT:  [phone]

## 2020-10-20 ENCOUNTER — HOSPITAL ENCOUNTER (OUTPATIENT)
Dept: PAIN MEDICINE | Facility: HOSPITAL | Age: 85
Discharge: HOME | End: 2020-10-20

## 2020-10-20 ENCOUNTER — ANESTHESIA EVENT (OUTPATIENT)
Dept: PAIN MEDICINE | Facility: HOSPITAL | Age: 85
End: 2020-10-20

## 2020-10-20 ENCOUNTER — ANESTHESIA (OUTPATIENT)
Dept: PAIN MEDICINE | Facility: HOSPITAL | Age: 85
End: 2020-10-20

## 2020-10-20 ENCOUNTER — HOSPITAL ENCOUNTER (OUTPATIENT)
Dept: GENERAL RADIOLOGY | Facility: HOSPITAL | Age: 85
Discharge: HOME | End: 2020-10-20

## 2020-10-20 VITALS
HEIGHT: 62 IN | WEIGHT: 150 LBS | TEMPERATURE: 97.8 F | SYSTOLIC BLOOD PRESSURE: 163 MMHG | BODY MASS INDEX: 27.6 KG/M2 | HEART RATE: 74 BPM | RESPIRATION RATE: 14 BRPM | DIASTOLIC BLOOD PRESSURE: 97 MMHG | OXYGEN SATURATION: 95 %

## 2020-10-20 DIAGNOSIS — R52 PAIN: ICD-10-CM

## 2020-10-20 DIAGNOSIS — M54.16 RIGHT LUMBAR RADICULOPATHY: Primary | ICD-10-CM

## 2020-10-20 PROCEDURE — 25010000002 METHYLPREDNISOLONE PER 80 MG: Performed by: ANESTHESIOLOGY

## 2020-10-20 PROCEDURE — 0 IOPAMIDOL 41 % SOLUTION: Performed by: ANESTHESIOLOGY

## 2020-10-20 PROCEDURE — C1755 CATHETER, INTRASPINAL: HCPCS

## 2020-10-20 PROCEDURE — 77003 FLUOROGUIDE FOR SPINE INJECT: CPT

## 2020-10-20 RX ORDER — METHYLPREDNISOLONE ACETATE 80 MG/ML
80 INJECTION, SUSPENSION INTRA-ARTICULAR; INTRALESIONAL; INTRAMUSCULAR; SOFT TISSUE ONCE
Status: COMPLETED | OUTPATIENT
Start: 2020-10-20 | End: 2020-10-20

## 2020-10-20 RX ORDER — FENTANYL CITRATE 50 UG/ML
50 INJECTION, SOLUTION INTRAMUSCULAR; INTRAVENOUS AS NEEDED
Status: DISCONTINUED | OUTPATIENT
Start: 2020-10-20 | End: 2020-10-21 | Stop reason: HOSPADM

## 2020-10-20 RX ORDER — LIDOCAINE HYDROCHLORIDE 10 MG/ML
1 INJECTION, SOLUTION INFILTRATION; PERINEURAL ONCE
Status: DISCONTINUED | OUTPATIENT
Start: 2020-10-20 | End: 2020-10-21 | Stop reason: HOSPADM

## 2020-10-20 RX ADMIN — IOPAMIDOL 3 ML: 408 INJECTION, SOLUTION INTRATHECAL at 12:21

## 2020-10-20 RX ADMIN — METHYLPREDNISOLONE ACETATE 80 MG: 80 INJECTION, SUSPENSION INTRA-ARTICULAR; INTRALESIONAL; INTRAMUSCULAR; SOFT TISSUE at 12:22

## 2020-10-20 NOTE — ANESTHESIA PROCEDURE NOTES
PAIN Epidural block      Patient reassessed immediately prior to procedure    Patient location during procedure: pain clinic  Indication:procedure for pain  Performed By  Anesthesiologist: Melchor Eckert MD  Preanesthetic Checklist  Completed: patient identified, site marked, surgical consent, pre-op evaluation, timeout performed, risks and benefits discussed and monitors and equipment checked  Additional Notes  Depomedrol - 80mg    Needle position confirmed by fluoroscopy and epidurogram using 2cc of [id number].    Diagnosis  Post-Op Diagnosis Codes:     * Lumbar degenerative disc disease (M51.36)     * Lumbar radiculopathy (M54.16)    Prep:  Pt Position:prone  Sterile Tech:cap, gloves, mask and sterile barrier  Prep:chlorhexidine gluconate and isopropyl alcohol  Monitoring:blood pressure monitoring, continuous pulse oximetry and EKG  Procedure:Sedation: no     Approach:right paramedian  Guidance: fluoroscopy  Location:lumbar  Level:L5-S1  Needle Type:Tuohy  Needle Gauge:20  Aspiration:negative  Medications:  Depomedrol:80 mg  Preservative Free Saline:2mL  Isovue:2mL    Post Assessment:  Post-procedure: bandaide.  Pt Tolerance:patient tolerated the procedure well with no apparent complications  Complications:no

## 2020-10-20 NOTE — H&P
[facility]    History and Physical    Patient Name: Kajal Severino  :  1935  MRN:  [id number]  Date of Admission: 10/20/2020    Subjective     Patient is a 85 y.o. female presents with chief complaint of chronic, intermitent, moderate low back and leg: right pain.  Onset of symptoms was gradual starting several months ago.  Symptoms are associated/aggravated by activity, standing or twisting. Symptoms improve with rest    The following portions of the patients history were reviewed and updated as appropriate: current medications, allergies, past medical history, past surgical history, past family history, past social history and problem list                Objective     Past Medical History:   Past Medical History:   Diagnosis Date   • Allergic rhinitis    • Back pain, lumbosacral    • Depression    • Erythrocytosis    • Fibrocystic breast    • H/O bone density study 2014   • History of anemia    • History of chest pain    • History of colonic polyps    • History of prior pregnancies     x7   • Hyperlipidemia    • Hypertension    • Hypothyroidism    • Osteoarthritis    • Osteoporosis    • Potassium deficiency    • Shortness of breath      Past Surgical History:   Past Surgical History:   Procedure Laterality Date   • BREAST BIOPSY Bilateral 1980    x3   •  SECTION N/A     x1   • COLONOSCOPY N/A 10/05/2012    + Diverticulosis and polyps   • PAP SMEAR N/A 11/15/2006     Family History:   Family History   Problem Relation Age of Onset   • Heart disease Mother    • Hypertension Mother    • Diabetes Father    • Heart disease Father    • Hypertension Father    • Breast cancer Sister    • Leukemia Brother         CML   • Breast cancer Daughter    • Hypertension Daughter    • Colon cancer Brother    • Prostate cancer Brother    • Prostate cancer Brother    • Kidney cancer Brother    • Hypertension Daughter      Social History:   Social History     Tobacco Use   • Smoking status: Former Smoker      Quit date:      Years since quittin.8   • Smokeless tobacco: Never Used   Substance Use Topics   • Alcohol use: No   • Drug use: No       Vital Signs Range for the last 24 hours  Temperature:     Temp Source:     BP:     Pulse:     Respirations:     SPO2:     O2 Amount (l/min):     O2 Devices     Weight:           --------------------------------------------------------------------------------    Current Outpatient Medications   Medication Sig Dispense Refill   • acetaminophen (TYLENOL) 650 MG 8 hr tablet Take 650 mg by mouth Every 8 (Eight) Hours As Needed for Mild Pain .     • fluticasone (FLONASE) 50 MCG/ACT nasal spray USE 2 SPRAYS IN EACH NOSTRIL EVERY DAY 16 mL 3   • HYDROcodone-acetaminophen (NORCO) 7.5-325 MG per tablet 1/2-1 po bid prn pain 30 tablet 0   • ibuprofen (ADVIL,MOTRIN) 200 MG tablet Take 200 mg by mouth 2 (two) times a day.     • levothyroxine (SYNTHROID, LEVOTHROID) 100 MCG tablet TAKE 1 TABLET BY MOUTH DAILY 90 tablet 3   • Loratadine (CLARITIN PO) Take  by mouth.     • losartan (COZAAR) 100 MG tablet TAKE 1 TABLET BY MOUTH DAILY 90 tablet 3   • melatonin 5 MG tablet tablet Take 5 mg by mouth Every Night.     • methylPREDNISolone (Medrol) 4 MG dose pack follow package directions 21 tablet 0   • traMADol (ULTRAM) 50 MG tablet Take 1 tablet by mouth Every 6 (Six) Hours As Needed for Moderate Pain . 120 tablet 1     No current facility-administered medications for this encounter.        --------------------------------------------------------------------------------  Assessment/Plan      Anesthesia Evaluation     Patient summary reviewed and Nursing notes reviewed   NPO Solid Status: > 8 hours  NPO Liquid Status: > 2 hours    Pain impairs ability to perform ADLs: Sleeping  Modalities previously tried to control pain with limited effectiveness within the last 4-6 weeks: Rest     Airway   Mallampati: II  TM distance: >3 FB  Neck ROM: full  Dental - normal exam     Pulmonary - normal exam    (+) a smoker Former, shortness of breath,   Cardiovascular - normal exam    (+) hypertension, hyperlipidemia,       Neuro/Psych- neuro exam normal  (+) numbness, psychiatric history Anxiety and Depression,     GI/Hepatic/Renal/Endo - negative ROS     Musculoskeletal (-) normal exam    (+) back pain, radiculopathy  Abdominal  - normal exam   Substance History - negative use     OB/GYN negative ob/gyn ROS         Other   arthritis,                 Diagnosis and Plan    Treatment Plan  ASA 3      Procedures: Lumbar Epidural Steroid Injection(LESI), With fluoroscopy,       Anesthetic plan and risks discussed with patient.          Diagnosis     * Lumbar degenerative disc disease [M51.36]     * Lumbar radiculopathy [M54.16]

## 2020-11-20 ENCOUNTER — OFFICE VISIT (OUTPATIENT)
Dept: INTERNAL MEDICINE | Facility: CLINIC | Age: 85
End: 2020-11-20

## 2020-11-20 VITALS
HEART RATE: 83 BPM | SYSTOLIC BLOOD PRESSURE: 160 MMHG | WEIGHT: 148 LBS | BODY MASS INDEX: 27.23 KG/M2 | DIASTOLIC BLOOD PRESSURE: 98 MMHG | OXYGEN SATURATION: 95 % | TEMPERATURE: 98.2 F | HEIGHT: 62 IN

## 2020-11-20 DIAGNOSIS — R59.0 ENLARGED LYMPH NODE IN NECK: Primary | ICD-10-CM

## 2020-11-20 PROCEDURE — 99213 OFFICE O/P EST LOW 20 MIN: CPT | Performed by: NURSE PRACTITIONER

## 2020-11-20 NOTE — PROGRESS NOTES
Name: Kajal Severino  :  1935    Subjective:      Chief Complaint   Patient presents with   • Knot on neck     x 1 week        Kajal Severino is a 85 y.o. female patient of Lluvia Liz MD who is here for lump on the left side of her neck.    She states that she thinks this is new to her.  She states that she did have a sore throat last week and it is now resolved.  She also states that the lump on the side of her throat is also getting smaller.    She denies any cough, night sweats, unexpected weight loss.    Her blood pressure is not controlled but she states she has not taken her blood pressure medication today and it is normal when she is at home.    She is new to me.     I have reviewed the patient's medical history in detail and updated the computerized patient record.    Past Medical History:   Diagnosis Date   • Allergic rhinitis    • Back pain, lumbosacral    • Depression    • Erythrocytosis    • Fibrocystic breast    • H/O bone density study 2014   • History of anemia    • History of chest pain    • History of colonic polyps    • History of prior pregnancies     x7   • Hyperlipidemia    • Hypertension    • Hypothyroidism    • Low back pain    • Osteoarthritis    • Osteoporosis    • Potassium deficiency    • Shortness of breath        Past Surgical History:   Procedure Laterality Date   • BREAST BIOPSY Bilateral 1980    x3   •  SECTION N/A     x1   • COLONOSCOPY N/A 10/05/2012    + Diverticulosis and polyps   • PAP SMEAR N/A 11/15/2006       Family History   Problem Relation Age of Onset   • Heart disease Mother    • Hypertension Mother    • Diabetes Father    • Heart disease Father    • Hypertension Father    • Breast cancer Sister    • Leukemia Brother         CML   • Breast cancer Daughter    • Hypertension Daughter    • Colon cancer Brother    • Prostate cancer Brother    • Prostate cancer Brother    • Kidney cancer Brother    • Hypertension Daughter        Social  History     Socioeconomic History   • Marital status:      Spouse name: Not on file   • Number of children: 4   • Years of education: High School   • Highest education level: Not on file   Occupational History     Employer: RETIRED   Tobacco Use   • Smoking status: Former Smoker     Quit date:      Years since quittin.9   • Smokeless tobacco: Never Used   Substance and Sexual Activity   • Alcohol use: No   • Drug use: No   • Sexual activity: Defer     Partners: Male       Most Recent Immunizations   Administered Date(s) Administered   • Fluzone High Dose =>65 Years ([employer] ONLY) 10/01/2020   • Pneumococcal Conjugate 13-Valent (PCV13) 2017   • Pneumococcal Polysaccharide (PPSV23) 2013   • Tdap 2013   • Zostavax 2013         Review of Systems:   Review of Systems   Constitutional: Negative for chills, fever and unexpected weight change.   HENT: Positive for sore throat. Negative for ear pain, postnasal drip and trouble swallowing.    Respiratory: Negative for shortness of breath.    Cardiovascular: Negative for chest pain.   Neurological: Negative for headaches.         Objective:      Physical Exam:   Physical Exam  Vitals signs reviewed.   Constitutional:       Appearance: She is well-developed.   HENT:      Head: Normocephalic.      Comments: Wearing mask due to COVID   Eyes:      Conjunctiva/sclera: Conjunctivae normal.   Neck:      Musculoskeletal: Normal range of motion and neck supple.      Thyroid: No thyromegaly.   Cardiovascular:      Rate and Rhythm: Normal rate and regular rhythm.      Pulses: Normal pulses.      Heart sounds: Normal heart sounds.   Pulmonary:      Effort: Pulmonary effort is normal.      Breath sounds: Normal breath sounds.      Comments: E/U   Abdominal:      General: Bowel sounds are normal.      Palpations: Abdomen is soft.   Lymphadenopathy:      Head:      Left side of head: Submandibular adenopathy present.      Cervical: No cervical  "adenopathy.      Comments: Enlarged small grape sized node, non-tender    Skin:     General: Skin is warm and dry.      Capillary Refill: Capillary refill takes 2 to 3 seconds.   Neurological:      Mental Status: She is alert and oriented to person, place, and time.   Psychiatric:         Behavior: Behavior normal. Behavior is cooperative.         Thought Content: Thought content normal.         Judgment: Judgment normal.           Vital Signs:  Vitals:    11/20/20 1106   BP: 160/98   BP Location: Left arm   Patient Position: Sitting   Cuff Size: Adult   Pulse: 83   Temp: 98.2 °F (36.8 °C)   TempSrc: Oral   SpO2: 95%   Weight: 67.1 kg (148 lb)   Height: 157.5 cm (62\")     Body mass index is 27.07 kg/m².            Requested Prescriptions      No prescriptions requested or ordered in this encounter     Routine medications provided by this office will also be refilled via pharmacy request.       Current Outpatient Medications:   •  acetaminophen (TYLENOL) 650 MG 8 hr tablet, Take 650 mg by mouth Every 8 (Eight) Hours As Needed for Mild Pain ., Disp: , Rfl:   •  fluticasone (FLONASE) 50 MCG/ACT nasal spray, USE 2 SPRAYS IN EACH NOSTRIL EVERY DAY, Disp: 16 mL, Rfl: 3  •  HYDROcodone-acetaminophen (NORCO) 7.5-325 MG per tablet, 1/2-1 po bid prn pain, Disp: 30 tablet, Rfl: 0  •  ibuprofen (ADVIL,MOTRIN) 200 MG tablet, Take 200 mg by mouth 2 (two) times a day., Disp: , Rfl:   •  levothyroxine (SYNTHROID, LEVOTHROID) 100 MCG tablet, TAKE 1 TABLET BY MOUTH DAILY, Disp: 90 tablet, Rfl: 3  •  Loratadine (CLARITIN PO), Take  by mouth., Disp: , Rfl:   •  losartan (COZAAR) 100 MG tablet, TAKE 1 TABLET BY MOUTH DAILY, Disp: 90 tablet, Rfl: 3  •  melatonin 5 MG tablet tablet, Take 5 mg by mouth Every Night., Disp: , Rfl:   •  traMADol (ULTRAM) 50 MG tablet, Take 1 tablet by mouth Every 6 (Six) Hours As Needed for Moderate Pain ., Disp: 120 tablet, Rfl: 1       Assessment and Plan:        Problems Addressed this Visit     None    " "  Visit Diagnoses     Enlarged lymph node in neck    -  Primary      Diagnoses       Codes Comments    Enlarged lymph node in neck    -  Primary ICD-10-CM: R59.0  ICD-9-CM: 785.6         As the large lymph node came on about the same time as her sore throat I feel like it is reactive.    She will continue to monitor and if not improved over the next 2 weeks she will call back to the office we will send her for ultrasound.       Discussed any change in Rx and discussed visit with patient.  All questions answered.      Return if symptoms worsen or fail to improve.    Geoffrey \"Deon\" Sheri, APRN   11/20/20    Dragon disclaimer:   Much of this encounter note is an electronic transcription/translation of spoken language to printed text. The electronic translation of spoken language may permit erroneous, or at times, nonsensical words or phrases to be inadvertently transcribed; Although I have reviewed the note for such errors, some may still exist.     Additional Patient Counseling:       There are no Patient Instructions on file for this visit.  "

## 2020-12-18 DIAGNOSIS — M54.50 BACK PAIN, LUMBOSACRAL: ICD-10-CM

## 2020-12-18 RX ORDER — TRAMADOL HYDROCHLORIDE 50 MG/1
TABLET ORAL
Qty: 120 TABLET | Refills: 0 | Status: SHIPPED | OUTPATIENT
Start: 2020-12-18 | End: 2021-02-19

## 2020-12-18 NOTE — TELEPHONE ENCOUNTER
Please review refill request:    Last OV: 11/20/2020    Last Prescribed: 9/10/2020        Thank you,    Von

## 2020-12-21 ENCOUNTER — ANESTHESIA EVENT (OUTPATIENT)
Dept: PAIN MEDICINE | Facility: HOSPITAL | Age: 85
End: 2020-12-21

## 2020-12-21 ENCOUNTER — HOSPITAL ENCOUNTER (OUTPATIENT)
Dept: GENERAL RADIOLOGY | Facility: HOSPITAL | Age: 85
Discharge: HOME | End: 2020-12-21

## 2020-12-21 ENCOUNTER — HOSPITAL ENCOUNTER (OUTPATIENT)
Dept: PAIN MEDICINE | Facility: HOSPITAL | Age: 85
Discharge: HOME | End: 2020-12-21

## 2020-12-21 ENCOUNTER — ANESTHESIA (OUTPATIENT)
Dept: PAIN MEDICINE | Facility: HOSPITAL | Age: 85
End: 2020-12-21

## 2020-12-21 VITALS
BODY MASS INDEX: 27.6 KG/M2 | HEART RATE: 68 BPM | RESPIRATION RATE: 16 BRPM | OXYGEN SATURATION: 98 % | TEMPERATURE: 96.9 F | SYSTOLIC BLOOD PRESSURE: 135 MMHG | DIASTOLIC BLOOD PRESSURE: 74 MMHG | HEIGHT: 62 IN | WEIGHT: 150 LBS

## 2020-12-21 DIAGNOSIS — R52 PAIN: ICD-10-CM

## 2020-12-21 DIAGNOSIS — M54.16 RIGHT LUMBAR RADICULOPATHY: ICD-10-CM

## 2020-12-21 PROCEDURE — C1755 CATHETER, INTRASPINAL: HCPCS

## 2020-12-21 PROCEDURE — 25010000002 METHYLPREDNISOLONE PER 80 MG: Performed by: ANESTHESIOLOGY

## 2020-12-21 PROCEDURE — 77003 FLUOROGUIDE FOR SPINE INJECT: CPT

## 2020-12-21 PROCEDURE — 0 IOPAMIDOL 41 % SOLUTION: Performed by: ANESTHESIOLOGY

## 2020-12-21 RX ORDER — METHYLPREDNISOLONE ACETATE 80 MG/ML
80 INJECTION, SUSPENSION INTRA-ARTICULAR; INTRALESIONAL; INTRAMUSCULAR; SOFT TISSUE ONCE
Status: COMPLETED | OUTPATIENT
Start: 2020-12-21 | End: 2020-12-21

## 2020-12-21 RX ORDER — SODIUM CHLORIDE 0.9 % (FLUSH) 0.9 %
1-10 SYRINGE (ML) INJECTION AS NEEDED
Status: DISCONTINUED | OUTPATIENT
Start: 2020-12-21 | End: 2020-12-22 | Stop reason: HOSPADM

## 2020-12-21 RX ORDER — MIDAZOLAM HYDROCHLORIDE 1 MG/ML
1 INJECTION INTRAMUSCULAR; INTRAVENOUS AS NEEDED
Status: DISCONTINUED | OUTPATIENT
Start: 2020-12-21 | End: 2020-12-22 | Stop reason: HOSPADM

## 2020-12-21 RX ORDER — FENTANYL CITRATE 50 UG/ML
50 INJECTION, SOLUTION INTRAMUSCULAR; INTRAVENOUS AS NEEDED
Status: DISCONTINUED | OUTPATIENT
Start: 2020-12-21 | End: 2020-12-22 | Stop reason: HOSPADM

## 2020-12-21 RX ORDER — LIDOCAINE HYDROCHLORIDE 10 MG/ML
1 INJECTION, SOLUTION INFILTRATION; PERINEURAL ONCE AS NEEDED
Status: DISCONTINUED | OUTPATIENT
Start: 2020-12-21 | End: 2020-12-22 | Stop reason: HOSPADM

## 2020-12-21 RX ADMIN — IOPAMIDOL 2 ML: 408 INJECTION, SOLUTION INTRATHECAL at 10:58

## 2020-12-21 RX ADMIN — METHYLPREDNISOLONE ACETATE 80 MG: 80 INJECTION, SUSPENSION INTRA-ARTICULAR; INTRALESIONAL; INTRAMUSCULAR; SOFT TISSUE at 10:58

## 2020-12-21 NOTE — H&P
INTERVAL HISTORY:    The patient returns for another Lumbar epidural steroid injection today.  They have received over 50% improvement since their last injection with a pain level of 7-8/10 at its worst recently.    Conservative measures tried in the interim     Current Outpatient Medications on File Prior to Encounter   Medication Sig Dispense Refill   • acetaminophen (TYLENOL) 650 MG 8 hr tablet Take 650 mg by mouth Every 8 (Eight) Hours As Needed for Mild Pain .     • fluticasone (FLONASE) 50 MCG/ACT nasal spray USE 2 SPRAYS IN EACH NOSTRIL EVERY DAY 16 mL 3   • HYDROcodone-acetaminophen (NORCO) 7.5-325 MG per tablet 1/2-1 po bid prn pain 30 tablet 0   • ibuprofen (ADVIL,MOTRIN) 200 MG tablet Take 200 mg by mouth 2 (two) times a day.     • levothyroxine (SYNTHROID, LEVOTHROID) 100 MCG tablet TAKE 1 TABLET BY MOUTH DAILY 90 tablet 3   • Loratadine (CLARITIN PO) Take  by mouth.     • losartan (COZAAR) 100 MG tablet TAKE 1 TABLET BY MOUTH DAILY 90 tablet 3   • melatonin 5 MG tablet tablet Take 5 mg by mouth Every Night.     • traMADol (ULTRAM) 50 MG tablet TAKE 1 TABLET BY MOUTH EVERY 6 HOURS AS NEEDED FOR MODERATE PAIN 120 tablet 0     No current facility-administered medications on file prior to encounter.        Past Medical History:   Diagnosis Date   • Allergic rhinitis    • Back pain, lumbosacral    • Depression    • Erythrocytosis    • Fibrocystic breast    • H/O bone density study 11/14/2014   • History of anemia    • History of chest pain    • History of colonic polyps    • History of prior pregnancies     x7   • Hyperlipidemia    • Hypertension    • Hypothyroidism    • Low back pain    • Osteoarthritis    • Osteoporosis    • Potassium deficiency    • Shortness of breath        No hematologic infectious or constitutional symptoms  Negative screen for OLAYINKA      Exam:  LMP  (LMP Unknown)   Airway Mallampatti 2  Alert and oriented      Diagnosis:  Post-Op Diagnosis Codes:     * Lumbar radiculopathy [M54.16]      * Lumbar degenerative disc disease [M51.36]    Plan:  Lumbar epidural steroid injection under fluoroscopic guidance    I have encouraged them to continue:  1.  Physical therapy exercises at home as prescribed by physical therapy or from the pain clinic handout (given to the patient).  Continuation of these exercises every day, or multiple times per week, even when the patient has good pain relief, was stressed to the patient as a preventative measure to decrease the frequency and severity of future pain episodes.  2.  Continue pain medicines as already prescribed.  If patient not currently taking any, it is recommended to begin Acetaminophen 1000 mg po q 8 hours.  If other medicines containing Acetaminophen are currently prescribed, maintain daily dose at 3000mg.    3.  If they can tolerate NSAIDS, it is recommended to take Ibuprofen 600 mg po q 6 hours for 7 days during pain exacerbations.   Alternatively, they may substitute an NSAID of their choice (e.g. Aleve)  4.  Heat and ice to the affected area as tolerated for pain control.  It was discussed that heating pads can cause burns.  5.  Low impact exercise such as walking or water exercise was recommended to maintain overall health and aid in weight control.   6.  Follow up as needed for subsequent injections.  7.  Patient was counseled to abstain from tobacco products.

## 2021-01-14 ENCOUNTER — TELEPHONE (OUTPATIENT)
Dept: INTERNAL MEDICINE | Facility: CLINIC | Age: 86
End: 2021-01-14

## 2021-01-14 RX ORDER — ESCITALOPRAM OXALATE 10 MG/1
10 TABLET ORAL DAILY
COMMUNITY
End: 2021-06-28

## 2021-01-14 NOTE — TELEPHONE ENCOUNTER
Caller: Keena Rider     Relationship: DAUGHTER    Best call back number: [phone]   What medications are you currently taking:   Current Outpatient Medications on File Prior to Visit   Medication Sig Dispense Refill   • acetaminophen (TYLENOL) 650 MG 8 hr tablet Take 650 mg by mouth Every 8 (Eight) Hours As Needed for Mild Pain .     • fluticasone (FLONASE) 50 MCG/ACT nasal spray USE 2 SPRAYS IN EACH NOSTRIL EVERY DAY 16 mL 3   • HYDROcodone-acetaminophen (NORCO) 7.5-325 MG per tablet 1/2-1 po bid prn pain 30 tablet 0   • ibuprofen (ADVIL,MOTRIN) 200 MG tablet Take 200 mg by mouth 2 (two) times a day.     • levothyroxine (SYNTHROID, LEVOTHROID) 100 MCG tablet TAKE 1 TABLET BY MOUTH DAILY 90 tablet 3   • Loratadine (CLARITIN PO) Take  by mouth.     • losartan (COZAAR) 100 MG tablet TAKE 1 TABLET BY MOUTH DAILY 90 tablet 3   • melatonin 5 MG tablet tablet Take 5 mg by mouth Every Night.     • traMADol (ULTRAM) 50 MG tablet TAKE 1 TABLET BY MOUTH EVERY 6 HOURS AS NEEDED FOR MODERATE PAIN 120 tablet 0     No current facility-administered medications on file prior to visit.         When did you start taking these medications:1/14/2021    Which medication are you concerned about: LEXAPRO       Who prescribed you this medication: DR. TENORIO    What are your concerns: MS. CID WOULD LIKE TO KNOW WHEN MS. ATKINS CAN START TAKING 1 FULL TABLET A DAY, WHAT THE PROCESS IS.     How long have you been taking these medications: YESTERDAY     How long have you had these concerns:TODAY

## 2021-01-14 NOTE — TELEPHONE ENCOUNTER
I spoke with Keena.  She said pt had taken Lexapro 10 mg previously, stopped taking about a year ago.    She said she has spoken with pt about going back on it and she is agreeable.  She does not need a prescription as she has plenty at home, but would like to know if she should start with 1/2 tablet.  If so, how long to take before increasing to 1 po qd.

## 2021-01-21 DIAGNOSIS — E03.9 HYPOTHYROIDISM, UNSPECIFIED TYPE: ICD-10-CM

## 2021-01-21 RX ORDER — LEVOTHYROXINE SODIUM 0.1 MG/1
TABLET ORAL
Qty: 90 TABLET | Refills: 0 | Status: SHIPPED | OUTPATIENT
Start: 2021-01-21 | End: 2021-05-03

## 2021-02-03 DIAGNOSIS — I10 ESSENTIAL HYPERTENSION: ICD-10-CM

## 2021-02-03 RX ORDER — LOSARTAN POTASSIUM 100 MG/1
TABLET ORAL
Qty: 90 TABLET | Refills: 0 | Status: SHIPPED | OUTPATIENT
Start: 2021-02-03 | End: 2021-05-03

## 2021-02-13 DIAGNOSIS — M54.50 BACK PAIN, LUMBOSACRAL: ICD-10-CM

## 2021-02-15 ENCOUNTER — APPOINTMENT (OUTPATIENT)
Dept: PAIN MEDICINE | Facility: HOSPITAL | Age: 86
End: 2021-02-15

## 2021-02-15 RX ORDER — TRAMADOL HYDROCHLORIDE 50 MG/1
TABLET ORAL
Qty: 120 TABLET | OUTPATIENT
Start: 2021-02-15

## 2021-02-17 ENCOUNTER — HOSPITAL ENCOUNTER (OUTPATIENT)
Dept: PAIN MEDICINE | Facility: HOSPITAL | Age: 86
Discharge: HOME | End: 2021-02-17

## 2021-02-17 ENCOUNTER — HOSPITAL ENCOUNTER (OUTPATIENT)
Dept: GENERAL RADIOLOGY | Facility: HOSPITAL | Age: 86
Discharge: HOME | End: 2021-02-17

## 2021-02-17 ENCOUNTER — ANESTHESIA (OUTPATIENT)
Dept: PAIN MEDICINE | Facility: HOSPITAL | Age: 86
End: 2021-02-17

## 2021-02-17 ENCOUNTER — ANESTHESIA EVENT (OUTPATIENT)
Dept: PAIN MEDICINE | Facility: HOSPITAL | Age: 86
End: 2021-02-17

## 2021-02-17 VITALS
TEMPERATURE: 97.8 F | SYSTOLIC BLOOD PRESSURE: 134 MMHG | DIASTOLIC BLOOD PRESSURE: 82 MMHG | HEART RATE: 98 BPM | RESPIRATION RATE: 14 BRPM | OXYGEN SATURATION: 96 %

## 2021-02-17 DIAGNOSIS — R52 PAIN: ICD-10-CM

## 2021-02-17 DIAGNOSIS — M54.16 RIGHT LUMBAR RADICULOPATHY: ICD-10-CM

## 2021-02-17 PROCEDURE — 25010000002 METHYLPREDNISOLONE PER 80 MG: Performed by: ANESTHESIOLOGY

## 2021-02-17 PROCEDURE — 77003 FLUOROGUIDE FOR SPINE INJECT: CPT

## 2021-02-17 PROCEDURE — 0 IOPAMIDOL 41 % SOLUTION: Performed by: ANESTHESIOLOGY

## 2021-02-17 RX ORDER — MIDAZOLAM HYDROCHLORIDE 1 MG/ML
1 INJECTION INTRAMUSCULAR; INTRAVENOUS AS NEEDED
Status: DISCONTINUED | OUTPATIENT
Start: 2021-02-17 | End: 2021-02-18 | Stop reason: HOSPADM

## 2021-02-17 RX ORDER — METHYLPREDNISOLONE ACETATE 80 MG/ML
80 INJECTION, SUSPENSION INTRA-ARTICULAR; INTRALESIONAL; INTRAMUSCULAR; SOFT TISSUE ONCE
Status: COMPLETED | OUTPATIENT
Start: 2021-02-17 | End: 2021-02-17

## 2021-02-17 RX ORDER — FENTANYL CITRATE 50 UG/ML
50 INJECTION, SOLUTION INTRAMUSCULAR; INTRAVENOUS AS NEEDED
Status: DISCONTINUED | OUTPATIENT
Start: 2021-02-17 | End: 2021-02-18 | Stop reason: HOSPADM

## 2021-02-17 RX ORDER — SODIUM CHLORIDE 0.9 % (FLUSH) 0.9 %
1-10 SYRINGE (ML) INJECTION AS NEEDED
Status: DISCONTINUED | OUTPATIENT
Start: 2021-02-17 | End: 2021-02-18 | Stop reason: HOSPADM

## 2021-02-17 RX ORDER — LIDOCAINE HYDROCHLORIDE 10 MG/ML
1 INJECTION, SOLUTION INFILTRATION; PERINEURAL ONCE AS NEEDED
Status: DISCONTINUED | OUTPATIENT
Start: 2021-02-17 | End: 2021-02-18 | Stop reason: HOSPADM

## 2021-02-17 RX ADMIN — METHYLPREDNISOLONE ACETATE 80 MG: 80 INJECTION, SUSPENSION INTRA-ARTICULAR; INTRALESIONAL; INTRAMUSCULAR; SOFT TISSUE at 13:43

## 2021-02-17 RX ADMIN — IOPAMIDOL 10 ML: 408 INJECTION, SOLUTION INTRATHECAL at 13:43

## 2021-02-17 NOTE — H&P
INTERVAL HISTORY:    The patient returns for another Lumbar epidural steroid injection today.  They have received 50% improvement since their last injection with a pain level of 4/10 at its worst recently.    Conservative measures tried in the interim Tylenol tramadol [place] Advil    Current Outpatient Medications on File Prior to Encounter   Medication Sig Dispense Refill   • acetaminophen (TYLENOL) 650 MG 8 hr tablet Take 650 mg by mouth Every 8 (Eight) Hours As Needed for Mild Pain .     • escitalopram (LEXAPRO) 10 MG tablet Take 10 mg by mouth Daily. 1/14/20-start with 1/2 tablet nightly for one week, then increase to 1 tablet nightly     • fluticasone (FLONASE) 50 MCG/ACT nasal spray USE 2 SPRAYS IN EACH NOSTRIL EVERY DAY 16 mL 3   • HYDROcodone-acetaminophen (NORCO) 7.5-325 MG per tablet 1/2-1 po bid prn pain 30 tablet 0   • ibuprofen (ADVIL,MOTRIN) 200 MG tablet Take 200 mg by mouth 2 (two) times a day.     • levothyroxine (SYNTHROID, LEVOTHROID) 100 MCG tablet TAKE 1 TABLET BY MOUTH DAILY 90 tablet 0   • Loratadine (CLARITIN PO) Take  by mouth.     • losartan (COZAAR) 100 MG tablet TAKE 1 TABLET BY MOUTH DAILY 90 tablet 0   • melatonin 5 MG tablet tablet Take 5 mg by mouth Every Night.     • traMADol (ULTRAM) 50 MG tablet TAKE 1 TABLET BY MOUTH EVERY 6 HOURS AS NEEDED FOR MODERATE PAIN 120 tablet 0     No current facility-administered medications on file prior to encounter.        Past Medical History:   Diagnosis Date   • Allergic rhinitis    • Back pain, lumbosacral    • Depression    • Erythrocytosis    • Fibrocystic breast    • H/O bone density study 11/14/2014   • History of anemia    • History of chest pain    • History of colonic polyps    • History of prior pregnancies     x7   • Hyperlipidemia    • Hypertension    • Hypothyroidism    • Low back pain    • Osteoarthritis    • Osteoporosis    • Potassium deficiency    • Shortness of breath        No hematologic infectious or constitutional  symptoms  Negative screen for OLAYINKA      Exam:  LMP  (LMP Unknown)   Airway Mallampatti 2  Alert and oriented      Diagnosis:  Post-Op Diagnosis Codes:     * Lumbar degenerative disc disease [M51.36]     * Lumbar neuritis [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  Physical therapy exercises at home as prescribed by physical therapy or from the pain clinic handout (given to the patient).  Continuation of these exercises every day, or multiple times per week, even when the patient has good pain relief, was stressed to the patient as a preventative measure to decrease the frequency and severity of future pain episodes.  2.  Continue pain medicines as already prescribed.  If patient not currently taking any, it is recommended to begin Acetaminophen 1000 mg po q 8 hours.  If other medicines containing Acetaminophen are currently prescribed, maintain daily dose at 3000mg.    3.  If they can tolerate NSAIDS, it is recommended to take Ibuprofen 600 mg po q 6 hours for 7 days during pain exacerbations.   Alternatively, they may substitute an NSAID of their choice (e.g. Aleve)  4.  Heat and ice to the affected area as tolerated for pain control.  It was discussed that heating pads can cause burns.  5.  Low impact exercise such as walking or water exercise was recommended to maintain overall health and aid in weight control.   6.  Follow up as needed for subsequent injections.  7.  Patient was counseled to abstain from tobacco products.

## 2021-02-17 NOTE — DISCHARGE INSTRUCTIONS
It is not known if steroid use around the time of Covid-19 vaccination negatively effects how well the vaccine works.     Today you received a long acting steroid injection.   Following guidelines from the [employer], you should wait 4 weeks before receiving the Covid -19 vaccination.Lumbar Epidural Steroid Injection Instructions  Plan includes:  1.  Lumbar epidural steroid injections, up to 3, spaced 4 weeks apart.  If pain control is acceptable after 1 or 2 injections, it was discussed with the patient that they may return for the subsequent injections if and when their pain returns.  The risks were discussed with the patient including failure of relief, worsening pain, Headache (post dural puncture headache), bleeding (epidural hematoma) and infection (epidural abscess or skin infection).  2.  Physical therapy exercises at home as prescribed by physical therapy or from the pain clinic handout (given to the patient).  Continuation of these exercises every day, or multiple times per week, even when the patient has good pain relief, was stressed to the patient as a preventative measure to decrease the frequency and severity of future pain episodes.  3.  Continue pain medicines as already prescribed.  If patient not currently taking any, it is recommended to begin Acetaminophen 1000 mg po q 8 hours.  If other medicines containing Acetaminophen are currently prescribed, maintain daily dose at 3000 mg.    4.  If they can tolerate NSAIDS, it is recommended to take Ibuprofen 600 mg po q 6 hours for 7 days during pain exacerbations.  Alternatively, they may substitute an NSAID of their choice (e.g. Aleve).  This may be taken at the same time as Acetaminophen.  5.  Heat and ice to the affected area as tolerated for pain control.  It was discussed that heating pads can cause burns.  6.  Daily low impact exercise such as walking or water exercise was recommended to maintain overall  health and aid in weight control.   7.  Follow up as needed for subsequent injections.  8.  Patient was counseled to abstain from tobacco products.

## 2021-02-17 NOTE — ANESTHESIA PROCEDURE NOTES
PAIN Epidural block      Patient reassessed immediately prior to procedure    Patient location during procedure: pain clinic  Indication:procedure for pain  Performed By  Anesthesiologist: Clark Poole MD  Preanesthetic Checklist  Completed: patient identified and risks and benefits discussed  Additional Notes  Diagnosis:     Post-Op Diagnosis Codes:     * Lumbar degenerative disc disease (M51.36)     * Lumbar neuritis (M54.16)    Sedation:  none    A lumbar epidural steroid injection with fluoroscopic guidance and an Isovue dye epidurogram was performed.  Under fluoroscopic guidance, the epidural space was identified and accessed, confirmed by loss of resistance to saline followed by injection of Isovue dye, which shows a good epidurogram.  The above medications were injected uneventfully.    Prep:  Pt Position:prone  Sterile Tech:cap, gloves, mask and sterile barrier  Prep:chlorhexidine gluconate and isopropyl alcohol  Monitoring:blood pressure monitoring, continuous pulse oximetry and EKG  Procedure:Sedation: no     Approach:right paramedian  Guidance: fluoroscopy  Location:lumbar  Level:4-5  Needle Type:Tuohy  Needle Gauge:20  Aspiration:negative  Medications:  Depomedrol:80  Preservative Free Saline:1mL  Isovue:1mL    Post Assessment:  Pt Tolerance:patient tolerated the procedure well with no apparent complications  Complications:no

## 2021-02-18 ENCOUNTER — OFFICE VISIT (OUTPATIENT)
Dept: INTERNAL MEDICINE | Facility: CLINIC | Age: 86
End: 2021-02-18

## 2021-02-18 ENCOUNTER — APPOINTMENT (OUTPATIENT)
Dept: PAIN MEDICINE | Facility: HOSPITAL | Age: 86
End: 2021-02-18

## 2021-02-18 DIAGNOSIS — R73.09 ABNORMAL GLUCOSE: ICD-10-CM

## 2021-02-18 DIAGNOSIS — R52 DIFFUSE PAIN: Primary | ICD-10-CM

## 2021-02-18 DIAGNOSIS — R53.82 CHRONIC FATIGUE: ICD-10-CM

## 2021-02-18 DIAGNOSIS — R51.9 NONINTRACTABLE HEADACHE, UNSPECIFIED CHRONICITY PATTERN, UNSPECIFIED HEADACHE TYPE: ICD-10-CM

## 2021-02-18 DIAGNOSIS — I10 ESSENTIAL HYPERTENSION: ICD-10-CM

## 2021-02-18 DIAGNOSIS — M54.16 RIGHT LUMBAR RADICULOPATHY: ICD-10-CM

## 2021-02-18 DIAGNOSIS — F41.9 ANXIETY: ICD-10-CM

## 2021-02-18 PROCEDURE — 99443 PR PHYS/QHP TELEPHONE EVALUATION 21-30 MIN: CPT | Performed by: INTERNAL MEDICINE

## 2021-02-18 RX ORDER — GABAPENTIN 100 MG/1
100 CAPSULE ORAL 3 TIMES DAILY
Qty: 30 CAPSULE | Refills: 3 | Status: SHIPPED | OUTPATIENT
Start: 2021-02-18 | End: 2021-04-05 | Stop reason: SDUPTHER

## 2021-02-18 NOTE — ASSESSMENT & PLAN NOTE
"\"sinus headache\" per pt - need daily antihis,flonase & nasal saline tid - need labs including sed rate within 1 wk  "

## 2021-02-18 NOTE — ASSESSMENT & PLAN NOTE
Epidural injection yesterday - trial of low dose gabapentin - only use tramadol if absolutely needed

## 2021-02-18 NOTE — PROGRESS NOTES
Chief Complaint  Back Pain, Headache, Anxiety, and Hypertension I called her today at [phone].    Subjective          Kajal Severino presents to [facility] for   Back Pain  This is a chronic problem. The current episode started more than 1 year ago. The problem occurs constantly. The problem is unchanged. Associated symptoms include headaches. Pertinent negatives include no chest pain or fever.   Headache   This is a recurrent problem. The problem occurs intermittently. The problem has been waxing and waning. The pain is located in the frontal region. The pain does not radiate. The pain quality is similar to prior headaches. The quality of the pain is described as aching. The pain is mild. Associated symptoms include back pain. Pertinent negatives include no coughing or fever.   Anxiety  Presents for follow-up visit. Symptoms include excessive worry and nervous/anxious behavior. Patient reports no chest pain, palpitations or shortness of breath.       Hypertension  This is a chronic problem. The current episode started more than 1 year ago. The problem is unchanged. The problem is controlled. Associated symptoms include anxiety and headaches. Pertinent negatives include no chest pain, palpitations or shortness of breath.       Review of Systems   Constitutional: Positive for fatigue. Negative for chills and fever.   Respiratory: Negative for cough, shortness of breath and wheezing.    Cardiovascular: Negative for chest pain, palpitations and leg swelling.   Musculoskeletal: Positive for back pain.   Neurological: Positive for headaches.   Psychiatric/Behavioral: Positive for sleep disturbance (occ). Negative for dysphoric mood. The patient is nervous/anxious.         Objective   Vital Signs:   There were no vitals taken for this visit.  No PE b/c phone visist.  Physical Exam   Result Review :                 Assessment and Plan    Problem List Items Addressed This Visit         "Unprioritized    Hypertension    Current Assessment & Plan     Hypertension is improving with treatment.  Continue current treatment regimen.  Dietary sodium restriction.  Blood pressure will be reassessed in 3 months.         Relevant Orders    CBC & Differential    Comprehensive Metabolic Panel    Lipid Panel    Urinalysis With Microscopic If Indicated (No Culture) - Urine, Clean Catch    Right lumbar radiculopathy    Current Assessment & Plan     Continue epidural inj - trial of low dose gabapentin         Relevant Medications    gabapentin (NEURONTIN) 100 MG capsule    Anxiety    Current Assessment & Plan     Need daily lexapro - call if worse.         Nonintractable headache    Current Assessment & Plan     \"sinus headache\" per pt - need daily antihis,flonase & nasal saline tid - need labs including sed rate within 1 wk         Diffuse pain - Primary    Current Assessment & Plan     Epidural injection yesterday - trial of low dose gabapentin - only use tramadol if absolutely needed         Relevant Medications    gabapentin (NEURONTIN) 100 MG capsule    Other Relevant Orders    Sedimentation Rate    Chronic fatigue    Relevant Orders    TSH Rfx On Abnormal To Free T4    Abnormal glucose    Relevant Orders    Hemoglobin A1c          Follow Up   Return in about 3 months (around 5/18/2021) for Medicare Wellness.  Patient was given instructions and counseling regarding her condition or for health maintenance advice. Please see specific information pulled into the AVS if appropriate.      This visit has been rescheduled as a phone visit to comply with patient safety concerns in accordance with CDC recommendations. Total time of discussion was 25 minutes.     You have chosen to receive care through a telephone visit. Do you consent to use a telephone visit for your medical care today? Yes  "

## 2021-02-23 ENCOUNTER — LAB (OUTPATIENT)
Dept: INTERNAL MEDICINE | Facility: CLINIC | Age: 86
End: 2021-02-23

## 2021-02-23 DIAGNOSIS — I10 ESSENTIAL HYPERTENSION: ICD-10-CM

## 2021-02-23 DIAGNOSIS — R73.09 ABNORMAL GLUCOSE: ICD-10-CM

## 2021-02-23 DIAGNOSIS — R53.82 CHRONIC FATIGUE: ICD-10-CM

## 2021-02-23 DIAGNOSIS — R52 DIFFUSE PAIN: ICD-10-CM

## 2021-02-23 LAB
ALBUMIN SERPL-MCNC: 4.3 G/DL (ref 3.5–5.2)
ALBUMIN/GLOB SERPL: 1.6 G/DL
ALP SERPL-CCNC: 79 U/L (ref 39–117)
ALT SERPL-CCNC: 18 U/L (ref 1–33)
AST SERPL-CCNC: 19 U/L (ref 1–32)
BASOPHILS # BLD AUTO: 0.05 10*3/MM3 (ref 0–0.2)
BASOPHILS NFR BLD AUTO: 0.7 % (ref 0–1.5)
BILIRUB SERPL-MCNC: 0.5 MG/DL (ref 0–1.2)
BUN SERPL-MCNC: 21 MG/DL (ref 8–23)
BUN/CREAT SERPL: 38.2 (ref 7–25)
CALCIUM SERPL-MCNC: 9.7 MG/DL (ref 8.6–10.5)
CHLORIDE SERPL-SCNC: 102 MMOL/L (ref 98–107)
CHOLEST SERPL-MCNC: 181 MG/DL (ref 0–200)
CO2 SERPL-SCNC: 28.5 MMOL/L (ref 22–29)
CREAT SERPL-MCNC: 0.55 MG/DL (ref 0.57–1)
EOSINOPHIL # BLD AUTO: 0.09 10*3/MM3 (ref 0–0.4)
EOSINOPHIL NFR BLD AUTO: 1.2 % (ref 0.3–6.2)
ERYTHROCYTE [DISTWIDTH] IN BLOOD BY AUTOMATED COUNT: 11.9 % (ref 12.3–15.4)
ERYTHROCYTE [SEDIMENTATION RATE] IN BLOOD: 4 MM/HR (ref 0–20)
GLOBULIN SER CALC-MCNC: 2.7 GM/DL
GLUCOSE SERPL-MCNC: 105 MG/DL (ref 65–99)
HBA1C MFR BLD: 5.7 % (ref 4.8–5.6)
HCT VFR BLD AUTO: 44 % (ref 34–46.6)
HDLC SERPL-MCNC: 62 MG/DL (ref 40–60)
HGB BLD-MCNC: 14.3 G/DL (ref 12–15.9)
IMM GRANULOCYTES # BLD AUTO: 0.03 10*3/MM3 (ref 0–0.05)
IMM GRANULOCYTES NFR BLD AUTO: 0.4 % (ref 0–0.5)
LDLC SERPL CALC-MCNC: 91 MG/DL (ref 0–100)
LYMPHOCYTES # BLD AUTO: 2.13 10*3/MM3 (ref 0.7–3.1)
LYMPHOCYTES NFR BLD AUTO: 29.1 % (ref 19.6–45.3)
MCH RBC QN AUTO: 31 PG (ref 26.6–33)
MCHC RBC AUTO-ENTMCNC: 32.5 G/DL (ref 31.5–35.7)
MCV RBC AUTO: 95.4 FL (ref 79–97)
MONOCYTES # BLD AUTO: 0.57 10*3/MM3 (ref 0.1–0.9)
MONOCYTES NFR BLD AUTO: 7.8 % (ref 5–12)
NEUTROPHILS # BLD AUTO: 4.45 10*3/MM3 (ref 1.7–7)
NEUTROPHILS NFR BLD AUTO: 60.8 % (ref 42.7–76)
NRBC BLD AUTO-RTO: 0 /100 WBC (ref 0–0.2)
PLATELET # BLD AUTO: 301 10*3/MM3 (ref 140–450)
POTASSIUM SERPL-SCNC: 4.1 MMOL/L (ref 3.5–5.2)
PROT SERPL-MCNC: 7 G/DL (ref 6–8.5)
RBC # BLD AUTO: 4.61 10*6/MM3 (ref 3.77–5.28)
SODIUM SERPL-SCNC: 142 MMOL/L (ref 136–145)
TRIGL SERPL-MCNC: 162 MG/DL (ref 0–150)
TSH SERPL DL<=0.005 MIU/L-ACNC: 1.4 UIU/ML (ref 0.27–4.2)
UNABLE TO VOID: NORMAL
VLDLC SERPL CALC-MCNC: 28 MG/DL (ref 5–40)
WBC # BLD AUTO: 7.32 10*3/MM3 (ref 3.4–10.8)

## 2021-02-26 ENCOUNTER — TELEPHONE (OUTPATIENT)
Dept: INTERNAL MEDICINE | Facility: CLINIC | Age: 86
End: 2021-02-26

## 2021-02-26 DIAGNOSIS — R52 DIFFUSE PAIN: ICD-10-CM

## 2021-02-26 DIAGNOSIS — M54.16 RIGHT LUMBAR RADICULOPATHY: ICD-10-CM

## 2021-02-26 NOTE — TELEPHONE ENCOUNTER
Discussed with daughter - will increase gabapentin from 100 mg to 200 mg, then slowly to 400 mg qhs if tolerated.  Continue low dose lexapro & keep appt next week.  She had normal CT & MRI of head almost 2 yrs ago - will need repeat if headache persists. Go to ER if worse.

## 2021-02-26 NOTE — TELEPHONE ENCOUNTER
Keena also sent [email or web address] message stating the following:    Dr Nunez, Kajal Severino has appt with you Mar 5 and is on cancellation list. You have seen her lab results. She is having bad headaches every day and not sleeping well. With the current Lexapro at 10 mg and Gabapentin 100mg only at night, could she increase either or both? Would you recommend another Gabapentin during the day (she naps every day) or more at night? You can reach me by phone  or message through here. Thanks, Keena Rider  [phone]

## 2021-02-26 NOTE — TELEPHONE ENCOUNTER
Pt daughter called back to let PCP know she still has the hydrocodone if PCP thinks she should still take that. Please advise at [phone].

## 2021-02-26 NOTE — TELEPHONE ENCOUNTER
PT DAUGHTER IS REQUESTING A CLL BACK TO DISCUSS RASING THE GABAPENTIN AND LEXAPRO UNTIL PT IS SEEN BY THE PCP. STATES PT IS IN A LOT OF PAIN.     CALL BACK [phone]

## 2021-03-01 ENCOUNTER — TELEPHONE (OUTPATIENT)
Dept: INTERNAL MEDICINE | Facility: CLINIC | Age: 86
End: 2021-03-01

## 2021-03-01 DIAGNOSIS — N64.4 BREAST PAIN: Primary | ICD-10-CM

## 2021-03-01 NOTE — TELEPHONE ENCOUNTER
Caller: Travon Rider    Relationship: Emergency Contact    Best call back number: [phone]    What orders are you requesting (i.e. lab or imaging): MAMMOGRAM    In what timeframe would the patient need to come in: Friday 3/5/21    Additional notes: PATIENT'S DAUGHTER CALLED AND STATED THAT PATIENT IS HAVING LEFT BREAST PAIN AND IS DUE FOR HER ANNUAL MAMMOGRAM. PATIENT HAS APPT WITH DR. TENORIO ON Friday AND SHE WANTS TO KNOW IF DR. TENORIO WILL DO AN ORDER FOR A MAMMOGRAM SO PATIENT COULD GET THAT DONE THE SAME DAY. PLEASE ADVISE.

## 2021-03-01 NOTE — TELEPHONE ENCOUNTER
Spoke with pt daughter and explained that pt is unable to have screening mammogram here at [facility], as daughter describes the pt's breast pain is in the left breast that she has had a past biopsy on as well as the pain is a localized pain in 2 specific areas. She would like to have this performed before seeing you on Friday, but is having difficulty getting scheduled as locations are booked up. I have advised her to contact [facility] and discuss with them that this is a Diagnostic issue vs Screening to see if that helps expedite things. Kimberly call daughter to discuss and order in chart if you would like.     Thanks-Annia

## 2021-03-02 DIAGNOSIS — Z23 IMMUNIZATION DUE: ICD-10-CM

## 2021-03-02 NOTE — TELEPHONE ENCOUNTER
Pts daughter was informed. She will call [facility] to schedule her mother for her testing.

## 2021-03-05 ENCOUNTER — OFFICE VISIT (OUTPATIENT)
Dept: INTERNAL MEDICINE | Facility: CLINIC | Age: 86
End: 2021-03-05

## 2021-03-05 VITALS
DIASTOLIC BLOOD PRESSURE: 78 MMHG | OXYGEN SATURATION: 97 % | SYSTOLIC BLOOD PRESSURE: 144 MMHG | BODY MASS INDEX: 27.38 KG/M2 | HEIGHT: 62 IN | WEIGHT: 148.8 LBS | TEMPERATURE: 97.7 F | HEART RATE: 70 BPM

## 2021-03-05 DIAGNOSIS — I10 ESSENTIAL HYPERTENSION: ICD-10-CM

## 2021-03-05 DIAGNOSIS — M79.671 RIGHT FOOT PAIN: ICD-10-CM

## 2021-03-05 DIAGNOSIS — E03.9 ACQUIRED HYPOTHYROIDISM: ICD-10-CM

## 2021-03-05 DIAGNOSIS — R51.9 NONINTRACTABLE HEADACHE, UNSPECIFIED CHRONICITY PATTERN, UNSPECIFIED HEADACHE TYPE: ICD-10-CM

## 2021-03-05 DIAGNOSIS — M54.41 CHRONIC BILATERAL LOW BACK PAIN WITH RIGHT-SIDED SCIATICA: ICD-10-CM

## 2021-03-05 DIAGNOSIS — M54.16 RIGHT LUMBAR RADICULOPATHY: ICD-10-CM

## 2021-03-05 DIAGNOSIS — N64.4 BREAST PAIN, LEFT: ICD-10-CM

## 2021-03-05 DIAGNOSIS — Z00.00 MEDICARE ANNUAL WELLNESS VISIT, SUBSEQUENT: Primary | ICD-10-CM

## 2021-03-05 DIAGNOSIS — G89.29 CHRONIC BILATERAL LOW BACK PAIN WITH RIGHT-SIDED SCIATICA: ICD-10-CM

## 2021-03-05 PROCEDURE — 1170F FXNL STATUS ASSESSED: CPT | Performed by: INTERNAL MEDICINE

## 2021-03-05 PROCEDURE — 96160 PT-FOCUSED HLTH RISK ASSMT: CPT | Performed by: INTERNAL MEDICINE

## 2021-03-05 PROCEDURE — 99214 OFFICE O/P EST MOD 30 MIN: CPT | Performed by: INTERNAL MEDICINE

## 2021-03-05 PROCEDURE — 1159F MED LIST DOCD IN RCRD: CPT | Performed by: INTERNAL MEDICINE

## 2021-03-05 PROCEDURE — 1125F AMNT PAIN NOTED PAIN PRSNT: CPT | Performed by: INTERNAL MEDICINE

## 2021-03-05 PROCEDURE — G0439 PPPS, SUBSEQ VISIT: HCPCS | Performed by: INTERNAL MEDICINE

## 2021-03-05 NOTE — PROGRESS NOTES
The ABCs of the Annual Wellness Visit  Subsequent Medicare Wellness Visit    Chief Complaint   Patient presents with   • Medicare Wellness-subsequent   • Headache     worse for 1 mo   • Breast Pain     tender on left breast - for 10 days   • Leg Pain     off & on - right shin - for sev mos - no change       Subjective   History of Present Illness:  Kajal Severino is a 86 y.o. female who presents for a Subsequent Medicare Wellness Visit.    HEALTH RISK ASSESSMENT    Recent Hospitalizations:  No hospitalization(s) within the last year.    Current Medical Providers:  Patient Care Team:  Lluvia Liz MD as PCP - General  Lluvia Liz MD as PCP - Family Medicine  Danny Borjas MD as Consulting Physician (Orthopedic Surgery)  Lluvia Liz MD as Referring Physician (Internal Medicine)  Radha Scruggs MD as Consulting Physician (Hematology and Oncology)  Jemmia Calabrese MD as Consulting Physician (Neurology)    Smoking Status:  Social History     Tobacco Use   Smoking Status Former Smoker   • Quit date:    • Years since quittin.1   Smokeless Tobacco Never Used       Alcohol Consumption:  Social History     Substance and Sexual Activity   Alcohol Use No       Depression Screen:   PHQ-2/PHQ-9 Depression Screening 3/5/2021   Little interest or pleasure in doing things 0   Feeling down, depressed, or hopeless 0   Trouble falling or staying asleep, or sleeping too much 0   Feeling tired or having little energy 1   Poor appetite or overeating 0   Feeling bad about yourself - or that you are a failure or have let yourself or your family down 0   Trouble concentrating on things, such as reading the newspaper or watching television 0   Moving or speaking so slowly that other people could have noticed. Or the opposite - being so fidgety or restless that you have been moving around a lot more than usual 0   Thoughts that you would be better off dead, or of hurting yourself in some way 0    Total Score 1   If you checked off any problems, how difficult have these problems made it for you to do your work, take care of things at home, or get along with other people? Not difficult at all       Fall Risk Screen:  RC Fall Risk Assessment was completed, and patient is at MODERATE risk for falls. Assessment completed on:3/5/2021    Health Habits and Functional and Cognitive Screening:  Functional & Cognitive Status 3/5/2021   Do you have difficulty preparing food and eating? Yes   Do you have difficulty bathing yourself, getting dressed or grooming yourself? No   Do you have difficulty using the toilet? No   Do you have difficulty moving around from place to place? No   Do you have trouble with steps or getting out of a bed or a chair? No   Current Diet Well Balanced Diet   Dental Exam Up to date   Eye Exam Up to date   Exercise (times per week) 0 times per week   Current Exercises Include No Regular Exercise   Current Exercise Activities Include -   Do you need help using the phone?  No   Are you deaf or do you have serious difficulty hearing?  No   Do you need help with transportation? Yes   Do you need help shopping? Yes   Do you need help preparing meals?  Yes   Do you need help with housework?  Yes   Do you need help with laundry? No   Do you need help taking your medications? No   Do you need help managing money? Yes   Do you ever drive or ride in a car without wearing a seat belt? No   Have you felt unusual stress, anger or loneliness in the last month? Yes   Who do you live with? Child   If you need help, do you have trouble finding someone available to you? No   Have you been bothered in the last four weeks by sexual problems? -   Do you have difficulty concentrating, remembering or making decisions? No         Does the patient have evidence of cognitive impairment? No    Asprin use counseling:Does not need ASA (and currently is not on it)    Age-appropriate Screening Schedule:  Refer to the  list below for future screening recommendations based on patient's age, sex and/or medical conditions. Orders for these recommended tests are listed in the plan section. The patient has been provided with a written plan.    Health Maintenance   Topic Date Due   • DXA SCAN  12/13/2020   • MAMMOGRAM  01/23/2021   • ZOSTER VACCINE (2 of 3) 03/14/2022 (Originally 5/13/2013)   • LIPID PANEL  02/23/2022   • TDAP/TD VACCINES (2 - Td) 03/18/2023   • INFLUENZA VACCINE  Completed          The following portions of the patient's history were reviewed and updated as appropriate: allergies, current medications, past family history, past medical history, past social history, past surgical history and problem list.    Outpatient Medications Prior to Visit   Medication Sig Dispense Refill   • acetaminophen (TYLENOL) 650 MG 8 hr tablet Take 650 mg by mouth Every 8 (Eight) Hours As Needed for Mild Pain .     • escitalopram (LEXAPRO) 10 MG tablet Take 10 mg by mouth Daily. 1/14/20-start with 1/2 tablet nightly for one week, then increase to 1 tablet nightly     • fluticasone (FLONASE) 50 MCG/ACT nasal spray USE 2 SPRAYS IN EACH NOSTRIL EVERY DAY 16 mL 3   • gabapentin (NEURONTIN) 100 MG capsule Take 1 capsule by mouth 3 (Three) Times a Day. 30 capsule 3   • ibuprofen (ADVIL,MOTRIN) 200 MG tablet Take 200 mg by mouth 2 (two) times a day.     • levothyroxine (SYNTHROID, LEVOTHROID) 100 MCG tablet TAKE 1 TABLET BY MOUTH DAILY 90 tablet 0   • losartan (COZAAR) 100 MG tablet TAKE 1 TABLET BY MOUTH DAILY 90 tablet 0   • melatonin 5 MG tablet tablet Take 5 mg by mouth Every Night.       No facility-administered medications prior to visit.        Patient Active Problem List   Diagnosis   • Hypertension   • Hyperlipidemia   • History of colonic polyps   • Hypothyroidism   • Allergic rhinitis   • Back pain, lumbosacral   • Osteoarthritis   • Vitamin D deficiency   • Acute right ankle pain   • Right foot pain   • Right lumbar radiculopathy   •  "Primary insomnia   • Bee sting allergy   • Anxiety   • Monopolar depression (CMS/[facility])   • Breast calcification, left   • Chronic bilateral low back pain with right-sided sciatica   • Nonintractable headache   • Diffuse pain   • Chronic fatigue   • Abnormal glucose   • Breast pain, left       Advanced Care Planning:  ACP discussion was held with the patient during this visit. Patient has an advance directive in EMR which is still valid.     Review of Systems    Compared to one year ago, the patient feels her physical health is worse.  Compared to one year ago, the patient feels her mental health is better.    Reviewed chart for potential of high risk medication in the elderly: yes  Reviewed chart for potential of harmful drug interactions in the elderly:yes    Objective         Vitals:    03/05/21 1017   BP: 144/78   BP Location: Left arm   Patient Position: Sitting   Cuff Size: Adult   Pulse: 70   Temp: 97.7 °F (36.5 °C)   SpO2: 97%   Weight: 67.5 kg (148 lb 12.8 oz)   Height: 157.5 cm (62\")   PainSc:   8   PainLoc: Head       Body mass index is 27.22 kg/m².  Discussed the patient's BMI with her. The BMI is above average; BMI management plan is completed.    Physical Exam    Lab Results   Component Value Date     (H) 02/23/2021    CHLPL 181 02/23/2021    TRIG 162 (H) 02/23/2021    HDL 62 (H) 02/23/2021    LDL 91 02/23/2021    VLDL 28 02/23/2021    HGBA1C 5.70 (H) 02/23/2021        Assessment/Plan   Medicare Risks and Personalized Health Plan  CMS Preventative Services Quick Reference  Abdominal Aortic Aneurysm Screening  Advance Directive Discussion  Breast Cancer/Mammogram Screening  Cardiovascular risk  Chronic Pain   Fall Risk  Immunizations Discussed/Encouraged (specific immunizations; Shingrix )  Inactivity/Sedentary  Obesity/Overweight   Osteoprorosis Risk    The above risks/problems have been discussed with the patient.  Pertinent information has been shared with the patient in the After Visit " Summary.  Follow up plans and orders are seen below in the Assessment/Plan Section.    Diagnoses and all orders for this visit:    1. Medicare annual wellness visit, subsequent (Primary)    2. Essential hypertension  Assessment & Plan:  Hypertension is improving with treatment.  Continue current treatment regimen.  Dietary sodium restriction.  Weight loss.  Regular aerobic exercise.  Blood pressure will be reassessed at the next regular appointment.      3. Nonintractable headache, unspecified chronicity pattern, unspecified headache type    4. Chronic bilateral low back pain with right-sided sciatica  Assessment & Plan:  Continue epidural injections (last one sev wks ago)      5. Right lumbar radiculopathy    6. Breast pain, left    7. Right foot pain  -     Ambulatory Referral to Podiatry    Follow Up:  No follow-ups on file.     An After Visit Summary and PPPS were given to the patient.      Need screening for AAA & carotid disease.  Information given today.   Need daily strengthening & balance exercises (shown today).

## 2021-03-05 NOTE — PROGRESS NOTES
Chief Complaint  Medicare Wellness-subsequent, Headache (worse for 1 mo), Breast Pain (tender on left breast - for 10 days), and Leg Pain (off & on - right shin - for sev mos - no change)    Subjective          Kajal Severino presents to [facility] for   Headache   This is a recurrent problem. The current episode started 1 to 4 weeks ago. The problem occurs intermittently. The problem has been unchanged. The pain is located in the frontal region. The pain does not radiate. The pain quality is not similar to prior headaches. The quality of the pain is described as aching. The pain is severe. Associated symptoms include back pain, ear pain, rhinorrhea and sinus pressure. Pertinent negatives include no abdominal pain, anorexia, blurred vision, coughing, dizziness, eye pain, eye redness, eye watering, facial sweating, fever, loss of balance, nausea, numbness, phonophobia, photophobia, seizures, sore throat, swollen glands, tingling, tinnitus, visual change, vomiting, weakness or weight loss. Nothing aggravates the symptoms. She has tried acetaminophen and cold packs for the symptoms. The treatment provided no relief. Her past medical history is significant for hypertension. There is no history of migraine headaches, migraines in the family or recent head traumas.   Breast Pain  This is a new problem. The current episode started 1 to 4 weeks ago. The problem occurs intermittently. The problem has been waxing and waning. Associated symptoms include arthralgias (right shin & foot pain ) and headaches. Pertinent negatives include no abdominal pain, anorexia, coughing, fever, nausea, numbness, sore throat, swollen glands, visual change, vomiting or weakness. Nothing aggravates the symptoms. She has tried nothing for the symptoms.   Leg Pain   The incident occurred more than 1 week ago. There was no injury mechanism. The pain is present in the right leg. The pain is moderate. The pain has been  "intermittent since onset. Pertinent negatives include no inability to bear weight, loss of motion, loss of sensation, muscle weakness, numbness or tingling. The symptoms are aggravated by weight bearing.       Review of Systems   Constitutional: Negative for fever and weight loss.   HENT: Positive for ear pain, rhinorrhea and sinus pressure. Negative for sore throat and tinnitus.    Eyes: Negative for blurred vision, photophobia, pain and redness.   Respiratory: Negative for cough.    Gastrointestinal: Negative for abdominal pain, anorexia, nausea and vomiting.   Musculoskeletal: Positive for arthralgias (right shin & foot pain ) and back pain.   Neurological: Positive for headaches. Negative for dizziness, tingling, seizures, weakness, numbness and loss of balance.        Objective   Vital Signs:   /78 (BP Location: Left arm, Patient Position: Sitting, Cuff Size: Adult)   Pulse 70   Temp 97.7 °F (36.5 °C)   Ht 157.5 cm (62\")   Wt 67.5 kg (148 lb 12.8 oz)   SpO2 97%   BMI 27.22 kg/m²     Physical Exam  Vitals signs and nursing note reviewed.   Constitutional:       General: She is not in acute distress.     Appearance: She is well-developed.   Cardiovascular:      Rate and Rhythm: Normal rate and regular rhythm.      Heart sounds: Normal heart sounds.   Pulmonary:      Effort: No respiratory distress.      Breath sounds: No wheezing or rales.   Chest:      Chest wall: No tenderness.      Breasts:         Right: Normal. No swelling, bleeding, inverted nipple, mass, nipple discharge, skin change or tenderness.         Left: Normal. No swelling, bleeding, inverted nipple, mass, nipple discharge, skin change or tenderness.   Musculoskeletal:         General: No swelling, tenderness (nontender right shin), deformity or signs of injury.      Right lower leg: No edema.      Left lower leg: No edema.   Lymphadenopathy:      Upper Body:      Right upper body: No supraclavicular, axillary or pectoral adenopathy. "      Left upper body: No supraclavicular, axillary or pectoral adenopathy.   Skin:     General: Skin is warm.      Findings: No bruising, erythema, lesion or rash.   Neurological:      General: No focal deficit present.      Cranial Nerves: No cranial nerve deficit.      Motor: No weakness.   Psychiatric:         Behavior: Behavior normal.        Result Review :                 Assessment and Plan    Problem List Items Addressed This Visit        Unprioritized    Hypertension    Current Assessment & Plan     Hypertension is improving with treatment.  Continue current treatment regimen.  Dietary sodium restriction.  Weight loss.  Regular aerobic exercise.  Blood pressure will be reassessed at the next regular appointment.         Hypothyroidism    Right foot pain    Relevant Orders    Ambulatory Referral to Podiatry (Completed)    Right lumbar radiculopathy    Chronic bilateral low back pain with right-sided sciatica    Current Assessment & Plan     Continue epidural injections (last one sev wks ago)         Nonintractable headache    Current Assessment & Plan     Mostly over sinus - need to add mucinex 600mg bid to zyrtec & flonase. Will need MRI or CT of head if sx persist (sed rate normal last wk).         Breast pain, left    Current Assessment & Plan     Normal exam today with no tenderness - keep appt for mammo & u/s 3/22/21           Other Visit Diagnoses     Medicare annual wellness visit, subsequent    -  Primary          Follow Up   No follow-ups on file.  Patient was given instructions and counseling regarding her condition or for health maintenance advice. Please see specific information pulled into the AVS if appropriate.

## 2021-03-05 NOTE — ASSESSMENT & PLAN NOTE
Mostly over sinus - need to add mucinex 600mg bid to zyrtec & flonase. Will need MRI or CT of head if sx persist (sed rate normal last wk).

## 2021-03-05 NOTE — PATIENT INSTRUCTIONS
Medicare Wellness  Personal Prevention Plan of Service     Date of Office Visit:  2021  Encounter Provider:  Lluvia Liz MD  Place of Service:  [facility]  Patient Name: Kajal Severino  :  1935    As part of the Medicare Wellness portion of your visit today, we are providing you with this personalized preventive plan of services (PPPS). This plan is based upon recommendations of the [place] Preventive Services Task Force (USPSTF) and the Advisory Committee on Immunization Practices (ACIP).    This lists the preventive care services that should be considered, and provides dates of when you are due. Items listed as completed are up-to-date and do not require any further intervention.    Health Maintenance   Topic Date Due   • COVID-19 Vaccine (1 of 2) 1951   • DXA SCAN  2020   • MAMMOGRAM  2021   • ZOSTER VACCINE (2 of 3) 2022 (Originally 2013)   • LIPID PANEL  2022   • ANNUAL WELLNESS VISIT  2022   • TDAP/TD VACCINES (2 - Td) 2023   • INFLUENZA VACCINE  Completed   • Pneumococcal Vaccine 65+  Completed   • MENINGOCOCCAL VACCINE  Aged Out       Orders Placed This Encounter   Procedures   • Ambulatory Referral to Podiatry     Referral Priority:   Routine     Referral Type:   Consultation     Referral Reason:   Specialty Services Required     Referred to Provider:   Mariel Prakash DPM     Requested Specialty:   Podiatry     Number of Visits Requested:   1       Return in about 3 months (around 2021).

## 2021-04-05 DIAGNOSIS — M54.16 RIGHT LUMBAR RADICULOPATHY: ICD-10-CM

## 2021-04-05 DIAGNOSIS — R52 DIFFUSE PAIN: ICD-10-CM

## 2021-04-05 NOTE — TELEPHONE ENCOUNTER
2/18/2021 note indicate a trial of gabapentin was being prescribed for diffuse pain- as patient need to use tramadol if absolutely needed that was d/c on 2/19/2021 by Dr. Liz.    Please advise

## 2021-04-06 RX ORDER — GABAPENTIN 100 MG/1
CAPSULE ORAL
Qty: 30 CAPSULE | Refills: 0 | Status: SHIPPED | OUTPATIENT
Start: 2021-04-06 | End: 2021-04-08

## 2021-04-06 RX ORDER — GABAPENTIN 100 MG/1
100 CAPSULE ORAL 3 TIMES DAILY
Qty: 30 CAPSULE | Refills: 3 | Status: SHIPPED | OUTPATIENT
Start: 2021-04-06 | End: 2021-05-17

## 2021-04-06 NOTE — TELEPHONE ENCOUNTER
No contract on file-pt needs to sign one at her next visit  Last OV 3/5/21  Next Scheduled OV 7/19/21

## 2021-04-07 NOTE — TELEPHONE ENCOUNTER
Two prescriptions were sent in for Gabapentin yesterday, one for # 30 w/ 0 refills and another for # 30 w/ 3 refills.    Please confirm which one you want patient to have.    Thanks

## 2021-04-08 NOTE — TELEPHONE ENCOUNTER
Lluvia Liz MD Simpson, Shereha, MA  Caller: Unspecified (3 days ago, 10:23 AM)  Ok for 3 R since she has appt in 3 mos       [id number] rx sent in for 0 refills.    Radha-pharmacist was informed to keep the rx w/ the 3 refills.

## 2021-05-02 DIAGNOSIS — I10 ESSENTIAL HYPERTENSION: ICD-10-CM

## 2021-05-02 DIAGNOSIS — E03.9 HYPOTHYROIDISM, UNSPECIFIED TYPE: ICD-10-CM

## 2021-05-03 RX ORDER — LEVOTHYROXINE SODIUM 0.1 MG/1
TABLET ORAL
Qty: 90 TABLET | Refills: 0 | Status: SHIPPED | OUTPATIENT
Start: 2021-05-03 | End: 2021-08-02

## 2021-05-03 RX ORDER — LOSARTAN POTASSIUM 100 MG/1
TABLET ORAL
Qty: 90 TABLET | Refills: 0 | Status: SHIPPED | OUTPATIENT
Start: 2021-05-03 | End: 2021-08-02

## 2021-05-17 DIAGNOSIS — M54.16 RIGHT LUMBAR RADICULOPATHY: ICD-10-CM

## 2021-05-17 DIAGNOSIS — R52 DIFFUSE PAIN: ICD-10-CM

## 2021-05-17 RX ORDER — GABAPENTIN 100 MG/1
CAPSULE ORAL
Qty: 30 CAPSULE | Refills: 2 | Status: SHIPPED | OUTPATIENT
Start: 2021-05-17 | End: 2021-06-08 | Stop reason: SDUPTHER

## 2021-06-08 DIAGNOSIS — M54.16 RIGHT LUMBAR RADICULOPATHY: ICD-10-CM

## 2021-06-08 DIAGNOSIS — R52 DIFFUSE PAIN: ICD-10-CM

## 2021-06-08 RX ORDER — GABAPENTIN 100 MG/1
100 CAPSULE ORAL 3 TIMES DAILY
Qty: 90 CAPSULE | Refills: 0 | Status: SHIPPED | OUTPATIENT
Start: 2021-06-08 | End: 2022-01-13

## 2021-06-08 NOTE — TELEPHONE ENCOUNTER
Please review refill request:    Last OV: 3/5/2021     Next OV: 7/19/2021    Last Prescribed: 5/17/2021    JUAN A: Provider to retrieve and review    Controlled Substance Agreement Form: Pt will need to sign a new form at next office visit.    Thank you,    Yoav

## 2021-06-08 NOTE — TELEPHONE ENCOUNTER
Caller: Travon Rider    Relationship: Emergency Contact    Best call back number: [phone]    What medications are you currently taking:   Current Outpatient Medications on File Prior to Visit   Medication Sig Dispense Refill   • acetaminophen (TYLENOL) 650 MG 8 hr tablet Take 650 mg by mouth Every 8 (Eight) Hours As Needed for Mild Pain .     • escitalopram (LEXAPRO) 10 MG tablet Take 10 mg by mouth Daily. 1/14/20-start with 1/2 tablet nightly for one week, then increase to 1 tablet nightly     • fluticasone (FLONASE) 50 MCG/ACT nasal spray USE 2 SPRAYS IN EACH NOSTRIL EVERY DAY 16 mL 3   • gabapentin (NEURONTIN) 100 MG capsule TAKE 1 CAPSULE BY MOUTH THREE TIMES DAILY 30 capsule 2   • ibuprofen (ADVIL,MOTRIN) 200 MG tablet Take 200 mg by mouth 2 (two) times a day.     • levothyroxine (SYNTHROID, LEVOTHROID) 100 MCG tablet TAKE 1 TABLET BY MOUTH DAILY 90 tablet 0   • losartan (COZAAR) 100 MG tablet TAKE 1 TABLET BY MOUTH DAILY 90 tablet 0   • melatonin 5 MG tablet tablet Take 5 mg by mouth Every Night.       No current facility-administered medications on file prior to visit.        When did you start taking these medications: MONTHS    Which medication are you concerned about: gabapentin (NEURONTIN) 100 MG capsule    Who prescribed you this medication: DR TENORIO    What are your concerns: DAUGHTER CALLED STATING PATIENT TAKES THIS MEDICATION 3 CAPSULE AT NIGHT AND THE PRESCRIPTION IS ONLY WRITTEN FOR QUANTITY OF 30 PILLS. PATIENT IS HAVING TO HAVE REFILLS EVERY 10 DAYS.    How long have you been taking these medications: MONTHS    How long have you had these concerns: TODAY    [id number] [address] [facility] [place] [phone] [place] [phone]

## 2021-06-10 ENCOUNTER — ANESTHESIA EVENT (OUTPATIENT)
Dept: PAIN MEDICINE | Facility: HOSPITAL | Age: 86
End: 2021-06-10

## 2021-06-10 ENCOUNTER — HOSPITAL ENCOUNTER (OUTPATIENT)
Dept: PAIN MEDICINE | Facility: HOSPITAL | Age: 86
Discharge: HOME | End: 2021-06-10

## 2021-06-10 ENCOUNTER — ANESTHESIA (OUTPATIENT)
Dept: PAIN MEDICINE | Facility: HOSPITAL | Age: 86
End: 2021-06-10

## 2021-06-10 ENCOUNTER — HOSPITAL ENCOUNTER (OUTPATIENT)
Dept: GENERAL RADIOLOGY | Facility: HOSPITAL | Age: 86
Discharge: HOME | End: 2021-06-10

## 2021-06-10 VITALS
TEMPERATURE: 97.3 F | OXYGEN SATURATION: 97 % | RESPIRATION RATE: 16 BRPM | HEART RATE: 62 BPM | SYSTOLIC BLOOD PRESSURE: 160 MMHG | DIASTOLIC BLOOD PRESSURE: 87 MMHG

## 2021-06-10 DIAGNOSIS — R52 PAIN: ICD-10-CM

## 2021-06-10 PROCEDURE — 25010000002 METHYLPREDNISOLONE PER 80 MG: Performed by: ANESTHESIOLOGY

## 2021-06-10 PROCEDURE — 77003 FLUOROGUIDE FOR SPINE INJECT: CPT

## 2021-06-10 PROCEDURE — 0 IOPAMIDOL 41 % SOLUTION: Performed by: ANESTHESIOLOGY

## 2021-06-10 RX ORDER — MIDAZOLAM HYDROCHLORIDE 1 MG/ML
1 INJECTION INTRAMUSCULAR; INTRAVENOUS AS NEEDED
Status: DISCONTINUED | OUTPATIENT
Start: 2021-06-10 | End: 2021-06-11 | Stop reason: HOSPADM

## 2021-06-10 RX ORDER — LIDOCAINE HYDROCHLORIDE 10 MG/ML
1 INJECTION, SOLUTION INFILTRATION; PERINEURAL ONCE AS NEEDED
Status: DISCONTINUED | OUTPATIENT
Start: 2021-06-10 | End: 2021-06-11 | Stop reason: HOSPADM

## 2021-06-10 RX ORDER — SODIUM CHLORIDE 0.9 % (FLUSH) 0.9 %
1-10 SYRINGE (ML) INJECTION AS NEEDED
Status: DISCONTINUED | OUTPATIENT
Start: 2021-06-10 | End: 2021-06-11 | Stop reason: HOSPADM

## 2021-06-10 RX ORDER — FENTANYL CITRATE 50 UG/ML
50 INJECTION, SOLUTION INTRAMUSCULAR; INTRAVENOUS AS NEEDED
Status: DISCONTINUED | OUTPATIENT
Start: 2021-06-10 | End: 2021-06-11 | Stop reason: HOSPADM

## 2021-06-10 RX ORDER — METHYLPREDNISOLONE ACETATE 80 MG/ML
80 INJECTION, SUSPENSION INTRA-ARTICULAR; INTRALESIONAL; INTRAMUSCULAR; SOFT TISSUE ONCE
Status: COMPLETED | OUTPATIENT
Start: 2021-06-10 | End: 2021-06-10

## 2021-06-10 RX ADMIN — METHYLPREDNISOLONE ACETATE 80 MG: 80 INJECTION, SUSPENSION INTRA-ARTICULAR; INTRALESIONAL; INTRAMUSCULAR; SOFT TISSUE at 10:05

## 2021-06-10 RX ADMIN — IOPAMIDOL 1 ML: 408 INJECTION, SOLUTION INTRATHECAL at 10:04

## 2021-06-10 NOTE — H&P
[facility]    History and Physical    Patient Name: Kajal Severino  :  1935  MRN:  [id number]  Date of Admission: 6/10/2021    Subjective     Patient is a 86 y.o. female presents with chief complaint of chronic low back, hips: right and buttock pain.  Onset of symptoms was gradual starting several years ago.  Symptoms are associated/aggravated by nothing in particular. Symptoms improve with injection    The following portions of the patients history were reviewed and updated as appropriate: current medications, allergies, past medical history, past surgical history, past family history, past social history and problem list    She reports low back pain that radiates into her right hip and buttock.  It goes down to the middle of her leg on that side as well.  It does not go below her knee.  She had previous epidural steroid injections in the past for which she reports about 75% relief of her pain.  She says the pain relief probably last up to 3 months.            Objective     Past Medical History:   Past Medical History:   Diagnosis Date   • Allergic rhinitis    • Back pain, lumbosacral    • Depression    • Erythrocytosis    • Fibrocystic breast    • H/O bone density study 2014   • History of anemia    • History of chest pain    • History of colonic polyps    • History of prior pregnancies     x7   • Hyperlipidemia    • Hypertension    • Hypothyroidism    • Low back pain    • Osteoarthritis    • Osteoporosis    • Potassium deficiency    • Shortness of breath      Past Surgical History:   Past Surgical History:   Procedure Laterality Date   • BREAST BIOPSY Bilateral 1980    x3   •  SECTION N/A     x1   • COLONOSCOPY N/A 10/05/2012    + Diverticulosis and polyps   • EPIDURAL BLOCK     • PAP SMEAR N/A 11/15/2006     Family History:   Family History   Problem Relation Age of Onset   • Heart disease Mother    • Hypertension Mother    • Diabetes Father    • Heart disease Father    • Hypertension  Father    • Breast cancer Sister    • Leukemia Brother         CML   • Breast cancer Daughter    • Hypertension Daughter    • Colon cancer Brother    • Prostate cancer Brother    • Prostate cancer Brother    • Kidney cancer Brother    • Hypertension Daughter      Social History:   Social History     Socioeconomic History   • Marital status:      Spouse name: Not on file   • Number of children: 4   • Years of education: High School   • Highest education level: Not on file   Tobacco Use   • Smoking status: Former Smoker     Quit date:      Years since quittin.4   • Smokeless tobacco: Never Used   Substance and Sexual Activity   • Alcohol use: No   • Drug use: No   • Sexual activity: Defer     Partners: Male       Vital Signs Range for the last 24 hours  Temperature: Temp:  [36.3 °C (97.3 °F)] 36.3 °C (97.3 °F)   Temp Source:     BP: BP: (157)/(92) 157/92   Pulse: Heart Rate:  [77] 77   Respirations: Resp:  [16] 16   SPO2: SpO2:  [96 %] 96 %   O2 Amount (l/min):     O2 Devices     Weight:           --------------------------------------------------------------------------------    Current Outpatient Medications   Medication Sig Dispense Refill   • acetaminophen (TYLENOL) 650 MG 8 hr tablet Take 650 mg by mouth Every 8 (Eight) Hours As Needed for Mild Pain .     • escitalopram (LEXAPRO) 10 MG tablet Take 10 mg by mouth Daily. 20-start with 1/2 tablet nightly for one week, then increase to 1 tablet nightly     • fluticasone (FLONASE) 50 MCG/ACT nasal spray USE 2 SPRAYS IN EACH NOSTRIL EVERY DAY 16 mL 3   • gabapentin (NEURONTIN) 100 MG capsule Take 1 capsule by mouth 3 (Three) Times a Day. 90 capsule 0   • ibuprofen (ADVIL,MOTRIN) 200 MG tablet Take 200 mg by mouth 2 (two) times a day.     • levothyroxine (SYNTHROID, LEVOTHROID) 100 MCG tablet TAKE 1 TABLET BY MOUTH DAILY 90 tablet 0   • losartan (COZAAR) 100 MG tablet TAKE 1 TABLET BY MOUTH DAILY 90 tablet 0   • melatonin 5 MG tablet tablet Take 5  mg by mouth Every Night.       Current Facility-Administered Medications   Medication Dose Route Frequency Provider Last Rate Last Admin   • fentaNYL citrate (PF) (SUBLIMAZE) injection 50 mcg  50 mcg Intravenous PRN RenderArjun MD       • iopamidol (ISOVUE-M 200) injection 41%  12 mL Epidural Once in imaging Laurie, Arjun Sheikh MD       • lidocaine (XYLOCAINE) 1 % injection 1 mL  1 mL Intradermal Once PRN Arjun Sullivan MD       • methylPREDNISolone acetate (DEPO-medrol) injection 80 mg  80 mg Intra-articular Once Render, Arjun Sheikh MD       • midazolam (VERSED) injection 1 mg  1 mg Intravenous PRN Render, Arjun Sheikh MD       • sodium chloride 0.9 % flush 1-10 mL  1-10 mL Intravenous PRN Arjun Sullivan MD           --------------------------------------------------------------------------------  Assessment/Plan      Anesthesia Evaluation           Pain impairs ability to perform ADLs: Exercise/Activity and Ambulation       Airway   Mallampati: II  TM distance: >3 FB  Neck ROM: full  No difficulty expected  Dental      Pulmonary - normal exam   (+) a smoker Former, shortness of breath,   Cardiovascular     Rhythm: regular    (+) hypertension, hyperlipidemia,   (-) murmur    PE comment: Lower extremities are warm without edema.  Dorsal pedal pulses are palpable bilaterally    Neuro/Psych- neuro exam normal  (-) normal sensory deficit, left straight leg raise test, right straight leg raise test  GI/Hepatic/Renal/Endo      Musculoskeletal         PE comment: Station and gait is slightly wide-based and antalgic.  She has no obvious motor deficits noted in her lower extremities.  Abdominal    Substance History      OB/GYN          Other   arthritis,                 Diagnosis and Plan    Treatment Plan  ASA 3   Patient has had previous injection/procedure with % improvement.   Procedures: Lumbar Epidural Steroid Injection(LESI), With fluoroscopy,       Anesthetic plan and risks discussed with  "patient.      Discussed with patient risk and benefits of MARIMAR including but not limited to : Bleeding, infection, PDPH, inadvertant spinal anesthetic, worsening pain, hyperglycemia, hypertension, CHF, nerve damage, steroid \"toxicity\" and AVN of hips.  Pt agrees to proceed.    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

## 2021-06-10 NOTE — ANESTHESIA PROCEDURE NOTES
PAIN Epidural block    Pre-sedation assessment completed: 6/10/2021 10:30 AM    Patient reassessed immediately prior to procedure    Patient location during procedure: pain clinic  Start Time: 6/10/2021 10:00 AM  Stop Time: 6/10/2021 10:06 AM  Indication:at surgeon's request and procedure for pain  Performed By  Anesthesiologist: Arjun Sullivan MD  Preanesthetic Checklist  Completed: patient identified, site marked, risks and benefits discussed, surgical consent, monitors and equipment checked, pre-op evaluation and timeout performed  Additional Notes  Post-Op Diagnosis Codes:     * Lumbar neuritis (M54.16)     * Degeneration of lumbar intervertebral disc (M51.36)    Prep:  Pt Position:prone  Sterile Tech:sterile barrier, mask and gloves  Prep:chlorhexidine gluconate and isopropyl alcohol  Monitoring:blood pressure monitoring, continuous pulse oximetry and EKG  Procedure:  Approach:right paramedian  Guidance: fluoroscopy  Location:lumbar  Level:L5-S1  Needle Gauge:20 G  Aspiration:negative  Test Dose:negative  Medications:  Depomedrol:80 mg  Preservative Free Saline:2mL  Isovue:2mL  Comments:Lumbar epidural steroid injection was performed on the right side at the L5 C4jjnio.  There was good loss resistance to injection.  No return of red blood cells or cerebrospinal fluid.  2 mL of Isovue were injected demonstrating cranial caudal spread.  There was no pain with injection.  80 mg of Depo-Medrol l were injected and the needle was withdrawn.  She tolerated the procedure well.  Post Assessment:  Pt Tolerance:patient tolerated the procedure well with no apparent complications  Complications:no

## 2021-06-28 RX ORDER — ESCITALOPRAM OXALATE 10 MG/1
TABLET ORAL
Qty: 135 TABLET | Refills: 1 | Status: SHIPPED | OUTPATIENT
Start: 2021-06-28 | End: 2022-02-14

## 2021-07-16 DIAGNOSIS — M54.16 RIGHT LUMBAR RADICULOPATHY: ICD-10-CM

## 2021-07-16 DIAGNOSIS — R52 DIFFUSE PAIN: ICD-10-CM

## 2021-07-19 ENCOUNTER — OFFICE VISIT (OUTPATIENT)
Dept: INTERNAL MEDICINE | Facility: CLINIC | Age: 86
End: 2021-07-19

## 2021-07-19 VITALS
BODY MASS INDEX: 28.16 KG/M2 | HEIGHT: 62 IN | OXYGEN SATURATION: 93 % | DIASTOLIC BLOOD PRESSURE: 82 MMHG | WEIGHT: 153 LBS | RESPIRATION RATE: 20 BRPM | TEMPERATURE: 98.2 F | SYSTOLIC BLOOD PRESSURE: 131 MMHG | HEART RATE: 70 BPM

## 2021-07-19 DIAGNOSIS — F41.9 ANXIETY: ICD-10-CM

## 2021-07-19 DIAGNOSIS — R52 DIFFUSE PAIN: ICD-10-CM

## 2021-07-19 DIAGNOSIS — E78.49 OTHER HYPERLIPIDEMIA: ICD-10-CM

## 2021-07-19 DIAGNOSIS — E03.9 ACQUIRED HYPOTHYROIDISM: ICD-10-CM

## 2021-07-19 DIAGNOSIS — M79.604 PAIN OF RIGHT LOWER EXTREMITY: ICD-10-CM

## 2021-07-19 DIAGNOSIS — G44.039 EPISODIC PAROXYSMAL HEMICRANIA, NOT INTRACTABLE: ICD-10-CM

## 2021-07-19 DIAGNOSIS — I10 ESSENTIAL HYPERTENSION: Primary | ICD-10-CM

## 2021-07-19 PROBLEM — M25.571 ACUTE RIGHT ANKLE PAIN: Status: RESOLVED | Noted: 2018-08-17 | Resolved: 2021-07-19

## 2021-07-19 PROCEDURE — 99214 OFFICE O/P EST MOD 30 MIN: CPT | Performed by: INTERNAL MEDICINE

## 2021-07-19 RX ORDER — GABAPENTIN 600 MG/1
600 TABLET ORAL NIGHTLY
Qty: 30 TABLET | Refills: 5 | Status: SHIPPED | OUTPATIENT
Start: 2021-07-19 | End: 2022-01-19

## 2021-07-19 RX ORDER — GABAPENTIN 100 MG/1
CAPSULE ORAL
Qty: 90 CAPSULE | Refills: 2 | OUTPATIENT
Start: 2021-07-19

## 2021-07-19 NOTE — PROGRESS NOTES
"Chief Complaint  Hypothyroidism (3 month f/u), Hyperlipidemia, Hypertension, Leg Pain (R leg/chin), and Headache    Subjective          Kajal Severino presents to [facility] for   Hypothyroidism  This is a chronic problem. The current episode started more than 1 year ago. The problem has been unchanged. Associated symptoms include headaches.   Hyperlipidemia  This is a chronic problem. The current episode started more than 1 year ago. The problem is controlled. Exacerbating diseases include hypothyroidism. Associated symptoms include leg pain.   Hypertension  This is a chronic problem. The current episode started more than 1 year ago. The problem is unchanged. The problem is controlled. Associated symptoms include headaches.   Leg Pain   The incident occurred more than 1 week ago. There was no injury mechanism. The pain is present in the right leg. The quality of the pain is described as aching and burning. The pain is moderate. The pain has been fluctuating since onset. She reports no foreign bodies present. The symptoms are aggravated by weight bearing.   Headache   This is a recurrent problem. The current episode started more than 1 year ago. The problem occurs daily. The problem has been unchanged. The pain is located in the frontal region. The pain does not radiate. The pain quality is similar to prior headaches. The quality of the pain is described as aching. The pain is moderate.       Review of Systems   Neurological: Positive for headaches.        Objective   Vital Signs:   /82 (BP Location: Left arm, Patient Position: Sitting, Cuff Size: Large Adult)   Pulse 70   Temp 98.2 °F (36.8 °C)   Resp 20   Ht 157.5 cm (62\")   Wt 69.4 kg (153 lb)   SpO2 93%   BMI 27.98 kg/m²     Physical Exam  Vitals and nursing note reviewed.   Constitutional:       General: She is not in acute distress.     Appearance: She is well-developed.   HENT:      Head: Normocephalic.      Right " Ear: External ear normal. Tympanic membrane is bulging. Tympanic membrane is not erythematous.      Left Ear: External ear normal. Tympanic membrane is bulging. Tympanic membrane is not erythematous.      Nose:      Right Sinus: No frontal sinus tenderness.      Left Sinus: No frontal sinus tenderness.      Mouth/Throat:      Pharynx: No oropharyngeal exudate.   Cardiovascular:      Rate and Rhythm: Normal rate and regular rhythm.      Heart sounds: Normal heart sounds.   Pulmonary:      Effort: Pulmonary effort is normal. No respiratory distress.      Breath sounds: Normal breath sounds. No wheezing or rales.   Chest:      Chest wall: No tenderness.   Musculoskeletal:         General: No swelling, tenderness, deformity or signs of injury.      Cervical back: Normal range of motion and neck supple.      Right lower leg: No edema.      Left lower leg: No edema.   Lymphadenopathy:      Cervical: No cervical adenopathy.   Skin:     General: Skin is warm and dry.      Coloration: Skin is not jaundiced or pale.      Findings: No bruising, erythema, lesion or rash.   Neurological:      General: No focal deficit present.      Cranial Nerves: No cranial nerve deficit.      Gait: Gait abnormal (wide based).   Psychiatric:         Behavior: Behavior normal.        Result Review :                 Assessment and Plan    Problem List Items Addressed This Visit        Unprioritized    Hypertension - Primary    Current Assessment & Plan     Hypertension is unchanged.  Continue current treatment regimen.  Dietary sodium restriction.  Regular aerobic exercise.  Continue current medications.  Blood pressure will be reassessed in 3 months.         Hyperlipidemia    Current Assessment & Plan     Lipid abnormalities are unchanged.  Nutritional counseling was provided.  Lipids will be reassessed in 3 months.         Hypothyroidism    Current Assessment & Plan     Continue Synthroid 100 mcg daily.         Pain of right lower extremity     Current Assessment & Plan     Slight tenderness lateral to the right leg shin but no erythema or warmth.  She does have varicose veins there.         Relevant Medications    gabapentin (NEURONTIN) 600 MG tablet    Other Relevant Orders    Ambulatory Referral to Physical Therapy Evaluate and treat, Ortho    Anxiety    Overview     She has had severe anxiety her entire life.  I recommend seeing psychiatrist.         Current Assessment & Plan     She or her daughter will call if anxiety still present after increasing gabapentin.  I warned them that increased gabapentin may cause increased drowsiness and tendency to fall -need to call if any side effects.         Nonintractable headache    Current Assessment & Plan     Trial of increased gabapentin.  She will see neurology if no better.         Relevant Medications    gabapentin (NEURONTIN) 600 MG tablet    Diffuse pain    Overview     Not controlled with low-dose gabapentin 300 mg at night.         Current Assessment & Plan     Trial of higher dose gabapentin 600 mg at night.  After 1 month if needed she may take a quarter during the day but she was warned about increased sleepiness with daytime medicine.         Relevant Medications    gabapentin (NEURONTIN) 600 MG tablet          Follow Up   Return in about 2 months (around 9/19/2021) for Recheck.  Patient was given instructions and counseling regarding her condition or for health maintenance advice. Please see specific information pulled into the AVS if appropriate.

## 2021-07-19 NOTE — ASSESSMENT & PLAN NOTE
Slight tenderness lateral to the right leg shin but no erythema or warmth.  She does have varicose veins there.

## 2021-07-19 NOTE — ASSESSMENT & PLAN NOTE
Trial of higher dose gabapentin 600 mg at night.  After 1 month if needed she may take a quarter during the day but she was warned about increased sleepiness with daytime medicine.

## 2021-07-19 NOTE — ASSESSMENT & PLAN NOTE
She or her daughter will call if anxiety still present after increasing gabapentin.  I warned them that increased gabapentin may cause increased drowsiness and tendency to fall -need to call if any side effects.

## 2021-07-19 NOTE — ASSESSMENT & PLAN NOTE
Hypertension is unchanged.  Continue current treatment regimen.  Dietary sodium restriction.  Regular aerobic exercise.  Continue current medications.  Blood pressure will be reassessed in 3 months.

## 2021-07-21 ENCOUNTER — TELEPHONE (OUTPATIENT)
Dept: INTERNAL MEDICINE | Facility: CLINIC | Age: 86
End: 2021-07-21

## 2021-07-21 NOTE — TELEPHONE ENCOUNTER
Caller: BELEN LIZ    Relationship: Child    Best call back number: [phone]    What is the medical concern/diagnosis: RIGHT LEG PAIN    What specialty or service is being requested: PHYSICAL THERAPY    What is the provider, practice or medical service name: [facility]    What is the office location: [address] [place] [address]    What is the office phone number: [phone]    Any additional details: PATIENT HAS BEEN SEEN AT THIS FACILITY BEFORE AND HAS ALREADY SCHEDULED AN APPOINTMENT BUT [facility] NEEDS A REFERRAL

## 2021-08-02 DIAGNOSIS — I10 ESSENTIAL HYPERTENSION: ICD-10-CM

## 2021-08-02 DIAGNOSIS — E03.9 HYPOTHYROIDISM, UNSPECIFIED TYPE: ICD-10-CM

## 2021-08-02 RX ORDER — LEVOTHYROXINE SODIUM 0.1 MG/1
TABLET ORAL
Qty: 90 TABLET | Refills: 1 | Status: SHIPPED | OUTPATIENT
Start: 2021-08-02 | End: 2022-02-14

## 2021-08-02 RX ORDER — LOSARTAN POTASSIUM 100 MG/1
TABLET ORAL
Qty: 90 TABLET | Refills: 1 | Status: SHIPPED | OUTPATIENT
Start: 2021-08-02 | End: 2022-02-14

## 2021-09-10 ENCOUNTER — TELEPHONE (OUTPATIENT)
Dept: INTERNAL MEDICINE | Facility: CLINIC | Age: 86
End: 2021-09-10

## 2021-09-10 NOTE — TELEPHONE ENCOUNTER
Caller: Travon Rider    Relationship: Emergency Contact    Best call back number: [phone]     What is the medical concern/diagnosis: BACK ISSUES    What specialty or service is being requested: PAIN MANAGEMENT EPIDURAL INJECTIONS    What is the provider, practice or medical service name: [facility] AT [facility].      What is the office phone number: [phone]

## 2021-09-13 DIAGNOSIS — M54.50 BACK PAIN, LUMBOSACRAL: Primary | ICD-10-CM

## 2021-09-16 ENCOUNTER — TELEPHONE (OUTPATIENT)
Dept: INTERNAL MEDICINE | Facility: CLINIC | Age: 86
End: 2021-09-16

## 2021-09-16 NOTE — TELEPHONE ENCOUNTER
Caller: TeresoDaughter Keena    Relationship: Emergency Contact  [phone]  Best call back number:     What orders are you requesting (i.e. lab or imaging): ORDER FOR TREATMENT, NOT CONSULT. PATIENT HAS ALREADY BEEN SEEN.     Where will you receive your lab/imaging services: [facility] WITH     Additional notes: PLEASE CALL TO ADVISE.

## 2021-10-06 ENCOUNTER — IMMUNIZATION (OUTPATIENT)
Dept: VACCINE CLINIC | Facility: HOSPITAL | Age: 86
End: 2021-10-06

## 2021-10-06 PROCEDURE — 0004A ADM SARSCOV2 30MCG/0.3ML BOOSTER: CPT | Performed by: INTERNAL MEDICINE

## 2021-10-06 PROCEDURE — 91300 HC SARSCOV02 VAC 30MCG/0.3ML IM: CPT | Performed by: INTERNAL MEDICINE

## 2021-10-06 PROCEDURE — 0001A: CPT | Performed by: INTERNAL MEDICINE

## 2021-10-21 ENCOUNTER — ANESTHESIA EVENT (OUTPATIENT)
Dept: PAIN MEDICINE | Facility: HOSPITAL | Age: 86
End: 2021-10-21

## 2021-10-21 ENCOUNTER — HOSPITAL ENCOUNTER (OUTPATIENT)
Dept: GENERAL RADIOLOGY | Facility: HOSPITAL | Age: 86
Discharge: HOME | End: 2021-10-21

## 2021-10-21 ENCOUNTER — ANESTHESIA (OUTPATIENT)
Dept: PAIN MEDICINE | Facility: HOSPITAL | Age: 86
End: 2021-10-21

## 2021-10-21 ENCOUNTER — HOSPITAL ENCOUNTER (OUTPATIENT)
Dept: PAIN MEDICINE | Facility: HOSPITAL | Age: 86
Discharge: HOME | End: 2021-10-21

## 2021-10-21 VITALS
SYSTOLIC BLOOD PRESSURE: 180 MMHG | BODY MASS INDEX: 28.16 KG/M2 | DIASTOLIC BLOOD PRESSURE: 89 MMHG | HEART RATE: 61 BPM | WEIGHT: 153 LBS | TEMPERATURE: 96.9 F | HEIGHT: 62 IN | RESPIRATION RATE: 14 BRPM | OXYGEN SATURATION: 97 %

## 2021-10-21 DIAGNOSIS — M54.16 RIGHT LUMBAR RADICULOPATHY: Primary | ICD-10-CM

## 2021-10-21 DIAGNOSIS — G89.29 CHRONIC BILATERAL LOW BACK PAIN WITH RIGHT-SIDED SCIATICA: ICD-10-CM

## 2021-10-21 DIAGNOSIS — R52 PAIN: ICD-10-CM

## 2021-10-21 DIAGNOSIS — M54.41 CHRONIC BILATERAL LOW BACK PAIN WITH RIGHT-SIDED SCIATICA: ICD-10-CM

## 2021-10-21 PROCEDURE — 0 IOPAMIDOL 41 % SOLUTION: Performed by: ANESTHESIOLOGY

## 2021-10-21 PROCEDURE — 25010000002 METHYLPREDNISOLONE PER 80 MG: Performed by: ANESTHESIOLOGY

## 2021-10-21 PROCEDURE — 77003 FLUOROGUIDE FOR SPINE INJECT: CPT

## 2021-10-21 RX ORDER — SODIUM CHLORIDE 0.9 % (FLUSH) 0.9 %
1-10 SYRINGE (ML) INJECTION AS NEEDED
Status: DISCONTINUED | OUTPATIENT
Start: 2021-10-21 | End: 2021-10-22 | Stop reason: HOSPADM

## 2021-10-21 RX ORDER — METHYLPREDNISOLONE ACETATE 80 MG/ML
80 INJECTION, SUSPENSION INTRA-ARTICULAR; INTRALESIONAL; INTRAMUSCULAR; SOFT TISSUE ONCE
Status: COMPLETED | OUTPATIENT
Start: 2021-10-21 | End: 2021-10-21

## 2021-10-21 RX ORDER — FENTANYL CITRATE 50 UG/ML
50 INJECTION, SOLUTION INTRAMUSCULAR; INTRAVENOUS AS NEEDED
Status: DISCONTINUED | OUTPATIENT
Start: 2021-10-21 | End: 2021-10-22 | Stop reason: HOSPADM

## 2021-10-21 RX ORDER — MIDAZOLAM HYDROCHLORIDE 1 MG/ML
1 INJECTION INTRAMUSCULAR; INTRAVENOUS AS NEEDED
Status: DISCONTINUED | OUTPATIENT
Start: 2021-10-21 | End: 2021-10-22 | Stop reason: HOSPADM

## 2021-10-21 RX ORDER — LIDOCAINE HYDROCHLORIDE 10 MG/ML
1 INJECTION, SOLUTION INFILTRATION; PERINEURAL ONCE AS NEEDED
Status: DISCONTINUED | OUTPATIENT
Start: 2021-10-21 | End: 2021-10-22 | Stop reason: HOSPADM

## 2021-10-21 RX ADMIN — IOPAMIDOL 10 ML: 408 INJECTION, SOLUTION INTRATHECAL at 10:52

## 2021-10-21 RX ADMIN — METHYLPREDNISOLONE ACETATE 80 MG: 80 INJECTION, SUSPENSION INTRA-ARTICULAR; INTRALESIONAL; INTRAMUSCULAR; SOFT TISSUE at 10:52

## 2021-10-21 NOTE — ANESTHESIA PROCEDURE NOTES
PAIN Epidural block    Pre-sedation assessment completed: 10/21/2021 10:43 AM    Patient reassessed immediately prior to procedure    Patient location during procedure: pain clinic  Indication:procedure for pain  Performed By  Anesthesiologist: Juan C Reyes MD  Preanesthetic Checklist  Completed: patient identified, IV checked, site marked, risks and benefits discussed, surgical consent, monitors and equipment checked, pre-op evaluation and timeout performed  Additional Notes  Performed under fluoroscopy  Post-Op Diagnosis Codes:     * Lumbar degenerative disc disease (M51.36)     * Lumbar radiculopathy (M54.16)  Prep:  Pt Position:prone  Sterile Tech:cap, gloves, mask and sterile barrier  Prep:chlorhexidine gluconate and isopropyl alcohol  Monitoring:blood pressure monitoring, continuous pulse oximetry and EKG  Procedure:Sedation: no     Approach:midline  Guidance: fluoroscopy  Location:lumbar (Intralaminar)  Level:L5-S1  Needle Type:Tuohy  Needle Gauge:20 G  Aspiration:negative  Medications:  Depomedrol:80 mg  Preservative Free Saline:4mL  Isovue:3mL  Comments:Needle placement confirmed with epidural spread of contrast injection.  Post Assessment:  Post-procedure: Bandaid.  Pt Tolerance:patient tolerated the procedure well with no apparent complications  Complications:no

## 2021-10-21 NOTE — H&P
[facility]    History and Physical    Patient Name: Kajal Severino  :  1935  MRN:  [id number]  Date of Admission: 10/21/2021    Subjective     Patient is a 86 y.o. female presents with chief complaint of chronic low back pain and occasional right leg pain, worse with standing. She has had multiple epidurals in the past for degeneration with radicular pain with good results.  The last was in [place] and she noted a 50% improvement and is here today to start a new series.    The following portions of the patients history were reviewed and updated as appropriate: current medications, allergies, past medical history, past surgical history, past family history, past social history and problem list                Objective     Past Medical History:   Past Medical History:   Diagnosis Date   • Allergic rhinitis    • Back pain, lumbosacral    • Depression    • Erythrocytosis    • Fibrocystic breast    • H/O bone density study 2014   • History of anemia    • History of chest pain    • History of colonic polyps    • History of prior pregnancies     x7   • Hyperlipidemia    • Hypertension    • Hypothyroidism    • Low back pain    • Osteoarthritis    • Osteoporosis    • Potassium deficiency    • Shortness of breath      Past Surgical History:   Past Surgical History:   Procedure Laterality Date   • BREAST BIOPSY Bilateral 1980    x3   •  SECTION N/A     x1   • COLONOSCOPY N/A 10/05/2012    + Diverticulosis and polyps   • EPIDURAL BLOCK     • PAP SMEAR N/A 11/15/2006     Family History:   Family History   Problem Relation Age of Onset   • Heart disease Mother    • Hypertension Mother    • Diabetes Father    • Heart disease Father    • Hypertension Father    • Breast cancer Sister    • Leukemia Brother         CML   • Breast cancer Daughter    • Hypertension Daughter    • Colon cancer Brother    • Prostate cancer Brother    • Prostate cancer Brother    • Kidney cancer Brother    • Hypertension Daughter   "    Social History:   Social History     Socioeconomic History   • Marital status:    • Number of children: 4   • Years of education: High School   Tobacco Use   • Smoking status: Former Smoker     Quit date:      Years since quittin.8   • Smokeless tobacco: Never Used   Substance and Sexual Activity   • Alcohol use: No   • Drug use: No   • Sexual activity: Defer     Partners: Male       Vital Signs Range for the last 24 hours  Temperature: Temp:  [36.1 °C (96.9 °F)] 36.1 °C (96.9 °F)   Temp Source: Temp src: Infrared   BP: BP: (169)/(88) 169/88   Pulse: Heart Rate:  [70] 70   Respirations: Resp:  [14] 14   SPO2: SpO2:  [97 %] 97 %   O2 Amount (l/min):     O2 Devices Device (Oxygen Therapy): room air   Weight: Weight:  [69.4 kg (153 lb)] 69.4 kg (153 lb)     Flowsheet Rows      First Filed Value   Admission Height 157 cm (61.81\") Documented at 10/21/2021 1001   Admission Weight 69.4 kg (153 lb) Documented at 10/21/2021 1001          --------------------------------------------------------------------------------    Current Outpatient Medications   Medication Sig Dispense Refill   • acetaminophen (TYLENOL) 650 MG 8 hr tablet Take 650 mg by mouth Every 8 (Eight) Hours As Needed for Mild Pain .     • escitalopram (LEXAPRO) 10 MG tablet TAKE 1 AND 1/2 TABLETS BY MOUTH DAILY 135 tablet 1   • fluticasone (FLONASE) 50 MCG/ACT nasal spray USE 2 SPRAYS IN EACH NOSTRIL EVERY DAY 16 mL 3   • gabapentin (NEURONTIN) 600 MG tablet Take 1 tablet by mouth Every Night. 30 tablet 5   • ibuprofen (ADVIL,MOTRIN) 200 MG tablet Take 200 mg by mouth 2 (two) times a day.     • levothyroxine (SYNTHROID, LEVOTHROID) 100 MCG tablet TAKE 1 TABLET BY MOUTH DAILY 90 tablet 1   • losartan (COZAAR) 100 MG tablet TAKE 1 TABLET BY MOUTH DAILY 90 tablet 1   • melatonin 5 MG tablet tablet Take 5 mg by mouth Every Night.     • gabapentin (NEURONTIN) 100 MG capsule Take 1 capsule by mouth 3 (Three) Times a Day. 90 capsule 0     No " current facility-administered medications for this encounter.       --------------------------------------------------------------------------------  Assessment/Plan      Anesthesia Evaluation                  Airway   Mallampati: II  Dental    (+) implants    Pulmonary    Cardiovascular - normal exam        Neuro/Psych- neuro exam normal  GI/Hepatic/Renal/Endo      Musculoskeletal     Abdominal    Substance History      OB/GYN          Other                   Diagnosis and Plan    Treatment Plan  ASA 3   Patient has had previous injection/procedure with 50-75% improvement.   Procedures: Lumbar Epidural Steroid Injection(LESI), With fluoroscopy,       Anesthetic plan and risks discussed with patient (Daughter).          Diagnosis     * Lumbar degenerative disc disease [M51.36]     * Lumbar radiculopathy [M54.16]

## 2021-10-26 DIAGNOSIS — E78.49 OTHER HYPERLIPIDEMIA: ICD-10-CM

## 2021-10-26 DIAGNOSIS — E03.9 ACQUIRED HYPOTHYROIDISM: ICD-10-CM

## 2021-10-26 DIAGNOSIS — I10 PRIMARY HYPERTENSION: Primary | ICD-10-CM

## 2021-11-12 ENCOUNTER — TELEPHONE (OUTPATIENT)
Dept: INTERNAL MEDICINE | Facility: CLINIC | Age: 86
End: 2021-11-12

## 2021-11-12 NOTE — TELEPHONE ENCOUNTER
Caller: Travon Rider    Relationship: Emergency Contact    Best call back number: [phone]*    What orders are you requesting (i.e. lab or imaging): BLOOD WORK    Where will you receive your lab/imaging services: IN OFFICE    Additional notes: PATIENT'S DAUGHTER, ETHAN, CALLED REQUESTING LAB ORDER FOR THE PATIENT SINCE HER February APPT HAS BEEN CANCELED DUE TO DR. TENORIO RETIRING.          
Pt scheduled for an F/U appt with HAILY Kingston for labs and medication review.      
This patient was evaluated for sepsis.  At this time, a diagnosis of sepsis is not supported by the overall clinical picture.

## 2022-01-13 ENCOUNTER — OFFICE VISIT (OUTPATIENT)
Dept: INTERNAL MEDICINE | Facility: CLINIC | Age: 87
End: 2022-01-13

## 2022-01-13 VITALS
TEMPERATURE: 97.3 F | HEART RATE: 78 BPM | OXYGEN SATURATION: 98 % | HEIGHT: 62 IN | WEIGHT: 146 LBS | SYSTOLIC BLOOD PRESSURE: 134 MMHG | BODY MASS INDEX: 26.87 KG/M2 | DIASTOLIC BLOOD PRESSURE: 82 MMHG

## 2022-01-13 DIAGNOSIS — M54.41 CHRONIC BILATERAL LOW BACK PAIN WITH RIGHT-SIDED SCIATICA: Chronic | ICD-10-CM

## 2022-01-13 DIAGNOSIS — F33.9 MONOPOLAR DEPRESSION: Chronic | ICD-10-CM

## 2022-01-13 DIAGNOSIS — E78.49 OTHER HYPERLIPIDEMIA: Chronic | ICD-10-CM

## 2022-01-13 DIAGNOSIS — R73.03 PREDIABETES: Chronic | ICD-10-CM

## 2022-01-13 DIAGNOSIS — I10 PRIMARY HYPERTENSION: Primary | Chronic | ICD-10-CM

## 2022-01-13 DIAGNOSIS — E55.9 VITAMIN D DEFICIENCY: Chronic | ICD-10-CM

## 2022-01-13 DIAGNOSIS — G89.29 CHRONIC BILATERAL LOW BACK PAIN WITH RIGHT-SIDED SCIATICA: Chronic | ICD-10-CM

## 2022-01-13 DIAGNOSIS — E03.9 ACQUIRED HYPOTHYROIDISM: ICD-10-CM

## 2022-01-13 PROCEDURE — 99214 OFFICE O/P EST MOD 30 MIN: CPT | Performed by: NURSE PRACTITIONER

## 2022-01-13 NOTE — PROGRESS NOTES
"Chief Complaint  Establish Care, Hypertension, Hypothyroidism, and Hyperlipidemia    Subjective          Kajal Severino presents to [facility] to establish care and to f/u on HTN, hyperlipidemia and hypothyroidism.    Patient presents to establish care. Previous medical history discussed with patient in details and reflected in problem list.  She is here with her daughter who provides assistance as needed.  She c/o chronic low back pain and stiffness, receives lumbar epidurals every 4 months. She is also taking Tylenol and Ibuprofen throughout the day for pain control. She c/o an intermittent, dull headache which improves with Tylenol.      Objective   Vital Signs:   /82 (BP Location: Left arm, Patient Position: Sitting, Cuff Size: Adult)   Pulse 78   Temp 97.3 °F (36.3 °C) (Temporal)   Ht 157.5 cm (62\")   Wt 66.2 kg (146 lb)   SpO2 98%   BMI 26.70 kg/m²     Physical Exam  Constitutional:       Appearance: She is well-developed. She is not ill-appearing.   HENT:      Head: Normocephalic.      Right Ear: Hearing, tympanic membrane and external ear normal.      Left Ear: Hearing, tympanic membrane and external ear normal.      Nose: Nose normal. No nasal deformity, mucosal edema or rhinorrhea.      Right Sinus: No maxillary sinus tenderness or frontal sinus tenderness.      Left Sinus: No maxillary sinus tenderness or frontal sinus tenderness.      Mouth/Throat:      Dentition: Normal dentition.   Eyes:      General: Lids are normal.         Right eye: No discharge.         Left eye: No discharge.      Conjunctiva/sclera: Conjunctivae normal.      Right eye: No exudate.     Left eye: No exudate.  Neck:      Thyroid: No thyroid mass or thyromegaly.      Vascular: No carotid bruit.      Trachea: Trachea normal.   Cardiovascular:      Rate and Rhythm: Regular rhythm.      Pulses: Normal pulses.      Heart sounds: Normal heart sounds. No murmur heard.      Pulmonary:      Effort: " No respiratory distress.      Breath sounds: Normal breath sounds. No decreased breath sounds, wheezing, rhonchi or rales.   Abdominal:      General: Bowel sounds are normal.      Palpations: Abdomen is soft.      Tenderness: There is no abdominal tenderness.   Musculoskeletal:      Cervical back: Normal range of motion. No edema.   Lymphadenopathy:      Head:      Right side of head: No submental, submandibular, tonsillar, preauricular, posterior auricular or occipital adenopathy.      Left side of head: No submental, submandibular, tonsillar, preauricular, posterior auricular or occipital adenopathy.   Skin:     General: Skin is warm and dry.      Nails: There is no clubbing.   Neurological:      Mental Status: She is alert.   Psychiatric:         Behavior: Behavior is cooperative.        Result Review :   The following data was reviewed by: HAILY Pisnao on 01/13/2022:  Common labs    Common Labsle 2/23/21 2/23/21 2/23/21 2/23/21 1/13/22 1/13/22 1/13/22 1/13/22    0932 0932 0932 0932 1032 1032 1032 1032   Glucose   105 (A)   98     BUN   21   21     Creatinine   0.55 (A)   0.74     eGFR Non African Am   105   74     eGFR  Am   127   85     Sodium   142   144     Potassium   4.1   4.2     Chloride   102   104     Calcium   9.7   9.1     Total Protein   7.0   6.5     Albumin   4.30   3.8     Total Bilirubin   0.5   0.3     Alkaline Phosphatase   79   84     AST (SGOT)   19   16     ALT (SGPT)   18   13     WBC    7.32   6.5    Hemoglobin    14.3   11.9    Hematocrit    44.0   36.7    Platelets    301   238    Total Cholesterol  181      167   Triglycerides  162 (A)      153 (A)   HDL Cholesterol  62 (A)      49   LDL Cholesterol   91      91   Hemoglobin A1C 5.70 (A)    5.8 (A)      (A) Abnormal value       Comments are available for some flowsheets but are not being displayed.           Data reviewed: Consultant notes pain mgmt          Assessment and Plan    Diagnoses and all orders for this  visit:    1. Primary hypertension (Primary)  Assessment & Plan:  Hypertension is unchanged.  Continue current treatment regimen.  Dietary sodium restriction.  Blood pressure will be reassessed at the next regular appointment.  She will continue Losartan which she is tolerating well.    Orders:  -     Comprehensive Metabolic Panel  -     CBC & Differential    2. Other hyperlipidemia  Assessment & Plan:  She tries to follow a low-fat, low-cholesterol diet; recheck lipid panel today.    Orders:  -     Lipid Panel    3. Vitamin D deficiency  Assessment & Plan:  Recheck levels today to determine Vitamin D supplement dose    Orders:  -     Vitamin D 25 Hydroxy    4. Acquired hypothyroidism  Assessment & Plan:  She is currently managed on Synthroid, recheck TSH    Orders:  -     TSH    5. Prediabetes  Assessment & Plan:  She tries to follow a low-carb, low-sugar diet; recheck A1c.    Orders:  -     Hemoglobin A1c    6. Chronic bilateral low back pain with right-sided sciatica  Assessment & Plan:  Pain is chronic and recurrent, receives epidurals every 4 months. She also takes Gabapentin nightly. We discussed over use of Tylenol and Ibuprofen, discussed lowering dosage for safety concerns (taking a combination of 5 daily, discussed tapering to 4).      7. Monopolar depression (HCC)  Assessment & Plan:  Sx managed with daily Escitalopram, tolerating well        Follow Up   Return in about 6 months (around 7/13/2022), or if symptoms worsen or fail to improve, for Next scheduled follow up.  Patient was given instructions and counseling regarding her condition or for health maintenance advice. Please see specific information pulled into the AVS if appropriate.

## 2022-01-14 LAB
25(OH)D3+25(OH)D2 SERPL-MCNC: 56.1 NG/ML (ref 30–100)
ALBUMIN SERPL-MCNC: 3.8 G/DL (ref 3.6–4.6)
ALBUMIN/GLOB SERPL: 1.4 {RATIO} (ref 1.2–2.2)
ALP SERPL-CCNC: 84 IU/L (ref 44–121)
ALT SERPL-CCNC: 13 IU/L (ref 0–32)
AST SERPL-CCNC: 16 IU/L (ref 0–40)
BASOPHILS # BLD AUTO: 0.1 X10E3/UL (ref 0–0.2)
BASOPHILS NFR BLD AUTO: 1 %
BILIRUB SERPL-MCNC: 0.3 MG/DL (ref 0–1.2)
BUN SERPL-MCNC: 21 MG/DL (ref 8–27)
BUN/CREAT SERPL: 28 (ref 12–28)
CALCIUM SERPL-MCNC: 9.1 MG/DL (ref 8.7–10.3)
CHLORIDE SERPL-SCNC: 104 MMOL/L (ref 96–106)
CHOLEST SERPL-MCNC: 167 MG/DL (ref 100–199)
CO2 SERPL-SCNC: 25 MMOL/L (ref 20–29)
CREAT SERPL-MCNC: 0.74 MG/DL (ref 0.57–1)
EOSINOPHIL # BLD AUTO: 0.2 X10E3/UL (ref 0–0.4)
EOSINOPHIL NFR BLD AUTO: 2 %
ERYTHROCYTE [DISTWIDTH] IN BLOOD BY AUTOMATED COUNT: 12.7 % (ref 11.7–15.4)
GLOBULIN SER CALC-MCNC: 2.7 G/DL (ref 1.5–4.5)
GLUCOSE SERPL-MCNC: 98 MG/DL (ref 65–99)
HBA1C MFR BLD: 5.8 % (ref 4.8–5.6)
HCT VFR BLD AUTO: 36.7 % (ref 34–46.6)
HDLC SERPL-MCNC: 49 MG/DL
HGB BLD-MCNC: 11.9 G/DL (ref 11.1–15.9)
IMM GRANULOCYTES # BLD AUTO: 0 X10E3/UL (ref 0–0.1)
IMM GRANULOCYTES NFR BLD AUTO: 0 %
LDLC SERPL CALC-MCNC: 91 MG/DL (ref 0–99)
LYMPHOCYTES # BLD AUTO: 2.1 X10E3/UL (ref 0.7–3.1)
LYMPHOCYTES NFR BLD AUTO: 32 %
MCH RBC QN AUTO: 30.1 PG (ref 26.6–33)
MCHC RBC AUTO-ENTMCNC: 32.4 G/DL (ref 31.5–35.7)
MCV RBC AUTO: 93 FL (ref 79–97)
MONOCYTES # BLD AUTO: 0.5 X10E3/UL (ref 0.1–0.9)
MONOCYTES NFR BLD AUTO: 8 %
NEUTROPHILS # BLD AUTO: 3.7 X10E3/UL (ref 1.4–7)
NEUTROPHILS NFR BLD AUTO: 57 %
PLATELET # BLD AUTO: 238 X10E3/UL (ref 150–450)
POTASSIUM SERPL-SCNC: 4.2 MMOL/L (ref 3.5–5.2)
PROT SERPL-MCNC: 6.5 G/DL (ref 6–8.5)
RBC # BLD AUTO: 3.95 X10E6/UL (ref 3.77–5.28)
SODIUM SERPL-SCNC: 144 MMOL/L (ref 134–144)
TRIGL SERPL-MCNC: 153 MG/DL (ref 0–149)
TSH SERPL DL<=0.005 MIU/L-ACNC: 0.81 UIU/ML (ref 0.45–4.5)
VLDLC SERPL CALC-MCNC: 27 MG/DL (ref 5–40)
WBC # BLD AUTO: 6.5 X10E3/UL (ref 3.4–10.8)

## 2022-01-14 NOTE — PROGRESS NOTES
Kajal, your recent labs show good vitamin D level-continue daily supplement.  Your 3-month glucose average is consistent with glucose intolerance-continue a low-carb, low sugar diet.  Kidney and liver function as well as electrolytes are normal.  Hemoglobin and hematocrit are normal without anemia.  Platelets are normal.  Cholesterol is good.  Thyroid function is normal-continue current Synthroid dose.  Take care and I will see you in July.

## 2022-01-16 PROBLEM — F33.9 MONOPOLAR DEPRESSION: Chronic | Status: ACTIVE | Noted: 2019-04-19

## 2022-01-16 NOTE — ASSESSMENT & PLAN NOTE
Pain is chronic and recurrent, receives epidurals every 4 months. She also takes Gabapentin nightly. We discussed over use of Tylenol and Ibuprofen, discussed lowering dosage for safety concerns (taking a combination of 5 daily, discussed tapering to 4).

## 2022-01-19 DIAGNOSIS — R52 DIFFUSE PAIN: ICD-10-CM

## 2022-01-19 DIAGNOSIS — M79.604 PAIN OF RIGHT LOWER EXTREMITY: ICD-10-CM

## 2022-01-19 DIAGNOSIS — G44.039 EPISODIC PAROXYSMAL HEMICRANIA, NOT INTRACTABLE: ICD-10-CM

## 2022-01-19 RX ORDER — GABAPENTIN 600 MG/1
600 TABLET ORAL NIGHTLY
Qty: 30 TABLET | Refills: 5 | Status: SHIPPED | OUTPATIENT
Start: 2022-01-19 | End: 2022-07-22 | Stop reason: SDUPTHER

## 2022-02-02 ENCOUNTER — HOSPITAL ENCOUNTER (OUTPATIENT)
Dept: GENERAL RADIOLOGY | Facility: HOSPITAL | Age: 87
Discharge: HOME | End: 2022-02-02

## 2022-02-02 ENCOUNTER — ANESTHESIA EVENT (OUTPATIENT)
Dept: PAIN MEDICINE | Facility: HOSPITAL | Age: 87
End: 2022-02-02

## 2022-02-02 ENCOUNTER — HOSPITAL ENCOUNTER (OUTPATIENT)
Dept: PAIN MEDICINE | Facility: HOSPITAL | Age: 87
Discharge: HOME | End: 2022-02-02

## 2022-02-02 ENCOUNTER — ANESTHESIA (OUTPATIENT)
Dept: PAIN MEDICINE | Facility: HOSPITAL | Age: 87
End: 2022-02-02

## 2022-02-02 VITALS
HEART RATE: 63 BPM | DIASTOLIC BLOOD PRESSURE: 87 MMHG | RESPIRATION RATE: 16 BRPM | SYSTOLIC BLOOD PRESSURE: 156 MMHG | TEMPERATURE: 97.3 F | OXYGEN SATURATION: 94 %

## 2022-02-02 DIAGNOSIS — G89.29 CHRONIC BILATERAL LOW BACK PAIN WITH RIGHT-SIDED SCIATICA: ICD-10-CM

## 2022-02-02 DIAGNOSIS — M54.16 RIGHT LUMBAR RADICULOPATHY: Primary | ICD-10-CM

## 2022-02-02 DIAGNOSIS — R52 PAIN: ICD-10-CM

## 2022-02-02 DIAGNOSIS — M54.41 CHRONIC BILATERAL LOW BACK PAIN WITH RIGHT-SIDED SCIATICA: ICD-10-CM

## 2022-02-02 PROCEDURE — 25010000002 METHYLPREDNISOLONE PER 80 MG: Performed by: ANESTHESIOLOGY

## 2022-02-02 PROCEDURE — 0 IOPAMIDOL 41 % SOLUTION: Performed by: ANESTHESIOLOGY

## 2022-02-02 PROCEDURE — 77003 FLUOROGUIDE FOR SPINE INJECT: CPT

## 2022-02-02 RX ORDER — MIDAZOLAM HYDROCHLORIDE 1 MG/ML
1 INJECTION INTRAMUSCULAR; INTRAVENOUS
Status: DISCONTINUED | OUTPATIENT
Start: 2022-02-02 | End: 2022-02-03 | Stop reason: HOSPADM

## 2022-02-02 RX ORDER — FENTANYL CITRATE 50 UG/ML
50 INJECTION, SOLUTION INTRAMUSCULAR; INTRAVENOUS AS NEEDED
Status: DISCONTINUED | OUTPATIENT
Start: 2022-02-02 | End: 2022-02-03 | Stop reason: HOSPADM

## 2022-02-02 RX ORDER — METHYLPREDNISOLONE ACETATE 80 MG/ML
80 INJECTION, SUSPENSION INTRA-ARTICULAR; INTRALESIONAL; INTRAMUSCULAR; SOFT TISSUE ONCE
Status: COMPLETED | OUTPATIENT
Start: 2022-02-02 | End: 2022-02-02

## 2022-02-02 RX ORDER — LIDOCAINE HYDROCHLORIDE 10 MG/ML
1 INJECTION, SOLUTION INFILTRATION; PERINEURAL ONCE
Status: DISCONTINUED | OUTPATIENT
Start: 2022-02-02 | End: 2022-02-03 | Stop reason: HOSPADM

## 2022-02-02 RX ADMIN — IOPAMIDOL 2 ML: 408 INJECTION, SOLUTION INTRATHECAL at 10:59

## 2022-02-02 RX ADMIN — METHYLPREDNISOLONE ACETATE 80 MG: 80 INJECTION, SUSPENSION INTRA-ARTICULAR; INTRALESIONAL; INTRAMUSCULAR; SOFT TISSUE at 11:00

## 2022-02-02 NOTE — ANESTHESIA PROCEDURE NOTES
PAIN Epidural block      Patient reassessed immediately prior to procedure    Patient location during procedure: pain clinic  Indication:procedure for pain  Performed By  Anesthesiologist: Melchor Eckert MD  Preanesthetic Checklist  Completed: patient identified, site marked, risks and benefits discussed, surgical consent, monitors and equipment checked, pre-op evaluation and timeout performed  Additional Notes  Depomedrol - 80mg    Needle position confirmed by fluoroscopy and epidurogram using 2cc of [id number].    Diagnosis  Post-Op Diagnosis Codes:     * Lumbar degenerative disc disease (M51.36)     * Lumbar radiculopathy (M54.16)    Prep:  Pt Position:prone  Sterile Tech:cap, gloves, mask and sterile barrier  Prep:chlorhexidine gluconate and isopropyl alcohol  Monitoring:blood pressure monitoring, continuous pulse oximetry and EKG  Procedure:Sedation: no     Approach:right paramedian  Guidance: fluoroscopy  Location:lumbar  Level:L5-S1  Needle Type:Tuohy  Needle Gauge:20  Aspiration:negative  Medications:  Preservative Free Saline:2mL  Isovue:2mL  Depomedrol:80 mg  Post Assessment:  Post-procedure: bandaide.  Pt Tolerance:patient tolerated the procedure well with no apparent complications  Complications:no

## 2022-02-02 NOTE — H&P
[facility]    History and Physical    Patient Name: Kajal Severino  :  1935  MRN:  [id number]  Date of Admission: 2022    Subjective     Patient is a 86 y.o. female presents with chief complaint of chronic, intermitent, moderate low back and leg: bilateral pain.  Onset of symptoms was gradual starting several years ago.  Symptoms are associated/aggravated by activity. Symptoms improve with rest    The following portions of the patients history were reviewed and updated as appropriate: current medications, allergies, past medical history, past surgical history, past family history, past social history and problem list                Objective     Past Medical History:   Past Medical History:   Diagnosis Date   • Allergic rhinitis    • Back pain, lumbosacral    • Depression    • Erythrocytosis    • Fibrocystic breast    • H/O bone density study 2014   • History of anemia    • History of chest pain    • History of colonic polyps    • History of prior pregnancies     x7   • Hyperlipidemia    • Hypertension    • Hypothyroidism    • Low back pain    • Osteoarthritis    • Osteoporosis    • Potassium deficiency    • Shortness of breath      Past Surgical History:   Past Surgical History:   Procedure Laterality Date   • BREAST BIOPSY Bilateral 1980    x3   •  SECTION N/A     x1   • COLONOSCOPY N/A 10/05/2012    + Diverticulosis and polyps   • EPIDURAL BLOCK     • PAP SMEAR N/A 11/15/2006     Family History:   Family History   Problem Relation Age of Onset   • Heart disease Mother    • Hypertension Mother    • Diabetes Father    • Heart disease Father    • Hypertension Father    • Breast cancer Sister    • Leukemia Brother         CML   • Breast cancer Daughter    • Hypertension Daughter    • Colon cancer Brother    • Prostate cancer Brother    • Prostate cancer Brother    • Kidney cancer Brother    • Hypertension Daughter      Social History:   Social History     Socioeconomic History   • Marital  status:    • Number of children: 4   • Years of education: High School   Tobacco Use   • Smoking status: Former Smoker     Quit date:      Years since quittin.1   • Smokeless tobacco: Never Used   Substance and Sexual Activity   • Alcohol use: No   • Drug use: No   • Sexual activity: Defer     Partners: Male       Vital Signs Range for the last 24 hours  Temperature:     Temp Source:     BP:     Pulse:     Respirations:     SPO2:     O2 Amount (l/min):     O2 Devices     Weight:           --------------------------------------------------------------------------------    Current Outpatient Medications   Medication Sig Dispense Refill   • acetaminophen (TYLENOL) 650 MG 8 hr tablet Take 650 mg by mouth Every 8 (Eight) Hours As Needed for Mild Pain .     • escitalopram (LEXAPRO) 10 MG tablet TAKE 1 AND 1/2 TABLETS BY MOUTH DAILY (Patient taking differently: 1 tablet daily) 135 tablet 1   • fluticasone (FLONASE) 50 MCG/ACT nasal spray USE 2 SPRAYS IN EACH NOSTRIL EVERY DAY 16 mL 3   • gabapentin (NEURONTIN) 600 MG tablet TAKE 1 TABLET BY MOUTH EVERY NIGHT 30 tablet 5   • ibuprofen (ADVIL,MOTRIN) 200 MG tablet Take 200 mg by mouth 2 (two) times a day.     • levothyroxine (SYNTHROID, LEVOTHROID) 100 MCG tablet TAKE 1 TABLET BY MOUTH DAILY 90 tablet 1   • losartan (COZAAR) 100 MG tablet TAKE 1 TABLET BY MOUTH DAILY 90 tablet 1     No current facility-administered medications for this encounter.       --------------------------------------------------------------------------------  Assessment/Plan      Anesthesia Evaluation     Patient summary reviewed and Nursing notes reviewed   NPO Solid Status: > 8 hours  NPO Liquid Status: > 2 hours    Pain impairs ability to perform ADLs: Sleeping  Modalities previously tried to control pain with limited effectiveness within the last 4-6 weeks: Rest     Airway   Mallampati: II  TM distance: >3 FB  Neck ROM: full  Dental - normal exam     Pulmonary - normal exam   (+)  shortness of breath,   Cardiovascular - normal exam    (+) hypertension, hyperlipidemia,       Neuro/Psych- neuro exam normal  (+) headaches, numbness, psychiatric history Depression,     GI/Hepatic/Renal/Endo    (+)   thyroid problem hypothyroidism    Musculoskeletal (-) normal exam    (+) back pain, radiculopathy  Abdominal  - normal exam   Substance History - negative use     OB/GYN negative ob/gyn ROS         Other   arthritis,                 Diagnosis and Plan    Treatment Plan  ASA 3      Procedures: Lumbar Epidural Steroid Injection(LESI), With fluoroscopy,       Anesthetic plan and risks discussed with patient.          Diagnosis     * Lumbar degenerative disc disease [M51.36]     * Lumbar radiculopathy [M54.16]

## 2022-02-02 NOTE — DISCHARGE INSTRUCTIONS
Lumbar Epidural Steroid Injection Instructions  Plan includes:  1.  Lumbar epidural steroid injections, up to 3, spaced 4 weeks apart.  If pain control is acceptable after 1 or 2 injections, it was discussed with the patient that they may return for the subsequent injections if and when their pain returns.  The risks were discussed with the patient including failure of relief, worsening pain, Headache (post dural puncture headache), bleeding (epidural hematoma) and infection (epidural abscess or skin infection).  2.  Physical therapy exercises at home as prescribed by physical therapy or from the pain clinic handout (given to the patient).  Continuation of these exercises every day, or multiple times per week, even when the patient has good pain relief, was stressed to the patient as a preventative measure to decrease the frequency and severity of future pain episodes.  3.  Continue pain medicines as already prescribed.  If patient not currently taking any, it is recommended to begin Acetaminophen 1000 mg po q 8 hours.  If other medicines containing Acetaminophen are currently prescribed, maintain daily dose at 3000 mg.    4.  If they can tolerate NSAIDS, it is recommended to take Ibuprofen 600 mg po q 6 hours for 7 days during pain exacerbations.  Alternatively, they may substitute an NSAID of their choice (e.g. Aleve).  This may be taken at the same time as Acetaminophen.  5.  Heat and ice to the affected area as tolerated for pain control.  It was discussed that heating pads can cause burns.  6.  Daily low impact exercise such as walking or water exercise was recommended to maintain overall health and aid in weight control.   7.  Follow up as needed for subsequent injections.  8.  Patient was counseled to abstain from tobacco products.  Lumbar Epidural Steroid Injection Instructions  Plan includes:  1.  Lumbar epidural steroid injections, up to 3, spaced 4 weeks apart.  If pain control is acceptable after 1 or 2  injections, it was discussed with the patient that they may return for the subsequent injections if and when their pain returns.  The risks were discussed with the patient including failure of relief, worsening pain, Headache (post dural puncture headache), bleeding (epidural hematoma) and infection (epidural abscess or skin infection).  2.  Physical therapy exercises at home as prescribed by physical therapy or from the pain clinic handout (given to the patient).  Continuation of these exercises every day, or multiple times per week, even when the patient has good pain relief, was stressed to the patient as a preventative measure to decrease the frequency and severity of future pain episodes.  3.  Continue pain medicines as already prescribed.  If patient not currently taking any, it is recommended to begin Acetaminophen 1000 mg po q 8 hours.  If other medicines containing Acetaminophen are currently prescribed, maintain daily dose at 3000 mg.    4.  If they can tolerate NSAIDS, it is recommended to take Ibuprofen 600 mg po q 6 hours for 7 days during pain exacerbations.  Alternatively, they may substitute an NSAID of their choice (e.g. Aleve).  This may be taken at the same time as Acetaminophen.  5.  Heat and ice to the affected area as tolerated for pain control.  It was discussed that heating pads can cause burns.  6.  Daily low impact exercise such as walking or water exercise was recommended to maintain overall health and aid in weight control.   7.  Follow up as needed for subsequent injections.  8.  Patient was counseled to abstain from tobacco products.

## 2022-02-13 DIAGNOSIS — E03.9 HYPOTHYROIDISM, UNSPECIFIED TYPE: ICD-10-CM

## 2022-02-13 DIAGNOSIS — I10 ESSENTIAL HYPERTENSION: ICD-10-CM

## 2022-02-14 RX ORDER — LEVOTHYROXINE SODIUM 0.1 MG/1
TABLET ORAL
Qty: 90 TABLET | Refills: 1 | Status: SHIPPED | OUTPATIENT
Start: 2022-02-14 | End: 2022-10-03

## 2022-02-14 RX ORDER — ESCITALOPRAM OXALATE 10 MG/1
TABLET ORAL
Qty: 135 TABLET | Refills: 1 | Status: SHIPPED | OUTPATIENT
Start: 2022-02-14 | End: 2022-11-28

## 2022-02-14 RX ORDER — LOSARTAN POTASSIUM 100 MG/1
TABLET ORAL
Qty: 90 TABLET | Refills: 1 | Status: SHIPPED | OUTPATIENT
Start: 2022-02-14 | End: 2022-08-16

## 2022-06-02 ENCOUNTER — APPOINTMENT (OUTPATIENT)
Dept: PAIN MEDICINE | Facility: HOSPITAL | Age: 87
End: 2022-06-02

## 2022-06-07 ENCOUNTER — ANESTHESIA (OUTPATIENT)
Dept: PAIN MEDICINE | Facility: HOSPITAL | Age: 87
End: 2022-06-07

## 2022-06-07 ENCOUNTER — HOSPITAL ENCOUNTER (OUTPATIENT)
Dept: GENERAL RADIOLOGY | Facility: HOSPITAL | Age: 87
Discharge: HOME | End: 2022-06-07

## 2022-06-07 ENCOUNTER — HOSPITAL ENCOUNTER (OUTPATIENT)
Dept: PAIN MEDICINE | Facility: HOSPITAL | Age: 87
Discharge: HOME | End: 2022-06-07

## 2022-06-07 ENCOUNTER — ANESTHESIA EVENT (OUTPATIENT)
Dept: PAIN MEDICINE | Facility: HOSPITAL | Age: 87
End: 2022-06-07

## 2022-06-07 VITALS
OXYGEN SATURATION: 96 % | HEART RATE: 62 BPM | SYSTOLIC BLOOD PRESSURE: 145 MMHG | TEMPERATURE: 97.3 F | RESPIRATION RATE: 14 BRPM | DIASTOLIC BLOOD PRESSURE: 78 MMHG

## 2022-06-07 DIAGNOSIS — M54.16 RIGHT LUMBAR RADICULOPATHY: ICD-10-CM

## 2022-06-07 DIAGNOSIS — R52 PAIN: ICD-10-CM

## 2022-06-07 PROCEDURE — 77003 FLUOROGUIDE FOR SPINE INJECT: CPT

## 2022-06-07 PROCEDURE — 0 IOPAMIDOL 41 % SOLUTION: Performed by: ANESTHESIOLOGY

## 2022-06-07 PROCEDURE — 25010000002 METHYLPREDNISOLONE PER 80 MG: Performed by: ANESTHESIOLOGY

## 2022-06-07 RX ORDER — FENTANYL CITRATE 50 UG/ML
50 INJECTION, SOLUTION INTRAMUSCULAR; INTRAVENOUS AS NEEDED
Status: DISCONTINUED | OUTPATIENT
Start: 2022-06-07 | End: 2022-06-08 | Stop reason: HOSPADM

## 2022-06-07 RX ORDER — LIDOCAINE HYDROCHLORIDE 10 MG/ML
1 INJECTION, SOLUTION INFILTRATION; PERINEURAL ONCE
Status: DISCONTINUED | OUTPATIENT
Start: 2022-06-07 | End: 2022-06-08 | Stop reason: HOSPADM

## 2022-06-07 RX ORDER — MIDAZOLAM HYDROCHLORIDE 1 MG/ML
1 INJECTION INTRAMUSCULAR; INTRAVENOUS
Status: DISCONTINUED | OUTPATIENT
Start: 2022-06-07 | End: 2022-06-08 | Stop reason: HOSPADM

## 2022-06-07 RX ORDER — METHYLPREDNISOLONE ACETATE 80 MG/ML
80 INJECTION, SUSPENSION INTRA-ARTICULAR; INTRALESIONAL; INTRAMUSCULAR; SOFT TISSUE ONCE
Status: COMPLETED | OUTPATIENT
Start: 2022-06-07 | End: 2022-06-07

## 2022-06-07 RX ADMIN — METHYLPREDNISOLONE ACETATE 80 MG: 80 INJECTION, SUSPENSION INTRA-ARTICULAR; INTRALESIONAL; INTRAMUSCULAR; SOFT TISSUE at 09:26

## 2022-06-07 RX ADMIN — IOPAMIDOL 10 ML: 408 INJECTION, SOLUTION INTRATHECAL at 09:26

## 2022-06-07 NOTE — ANESTHESIA PROCEDURE NOTES
PAIN Epidural block    Pre-sedation assessment completed: 6/7/2022 9:18 AM    Patient reassessed immediately prior to procedure    Patient location during procedure: pain clinic  Start Time: 6/7/2022 9:18 AM  Stop Time: 6/7/2022 9:27 AM  Indication:at surgeon's request and procedure for pain  Performed By  Anesthesiologist: Theodore Lo MD  Preanesthetic Checklist  Completed: patient identified, IV checked, site marked, risks and benefits discussed, surgical consent, monitors and equipment checked, pre-op evaluation and timeout performed  Additional Notes  Dx:  Post-Op Diagnosis Codes:     * Lumbar radiculopathy (M54.16)  80 mg depomedrol in epid    Plan : return to clinic as needed  Prep:  Pt Position:prone (prone)  Sterile Tech:cap, gloves, mask and sterile barrier  Prep:chlorhexidine gluconate and isopropyl alcohol  Monitoring:blood pressure monitoring, EKG and continuous pulse oximetry  Procedure:Sedation: no     Approach:midline  Guidance: fluoroscopy and c arm pa and lat and loss of resistance  Location:lumbar  Level:L5-S1 (interlaminar)  Needle Type:LSA Sportstana  Needle Gauge:20  Aspiration:negative  Medications:  Preservative Free Saline:3mL  Isovue:2mL  Depomedrol:80 mg  Post Assessment:  Post-procedure: bandaid.  Pt Tolerance:patient tolerated the procedure well with no apparent complications  Complications:no

## 2022-06-07 NOTE — H&P
INTERVAL HISTORY:    The patient returns for another Lumbar epidural steroid injection 3 today.  They have received 50 % improvement since their last injection with a pain level of 6 /10 at its worst recently.    Conservative measures tried in the interim. Daily activities are still affected by the pain.    Radiology reports and/or previous notes have been reviewed and are consistent with their diagnosis of Post-Op Diagnosis Codes:     * Lumbar radiculopathy [M54.16]    Alert and oriented  MP - 2  Lungs - clear  CV - rrr    Diagnosis:  Post-Op Diagnosis Codes:     * Lumbar radiculopathy [M54.16]      Plan:  Lumbar epidural steroid injection under fluoroscopic guidance        Target : L5S1    I have encouraged them to continue:  1.  Physical therapy exercises at home as prescribed by physical therapy or from the pain clinic handout (given to the patient).  Continuation of these exercises every day, or multiple times per week, even when the patient has good pain relief, was stressed to the patient as a preventative measure to decrease the frequency and severity of future pain episodes.  2.  Continue pain medicines as already prescribed.  If patient not currently taking any, it is recommended to begin Acetaminophen 1000 mg po q 8 hours.  If other medicines containing Acetaminophen are currently prescribed, maintain daily dose at 3000mg.    3.  If they can tolerate NSAIDS, it is recommended to take Ibuprofen 600 mg po q 6 hours for 7 days during pain exacerbations.   Alternatively, they may substitute an NSAID of their choice (e.g. Aleve)  4.  Heat and ice to the affected area as tolerated for pain control.  It was discussed that heating pads can cause burns.  5.  Low impact exercise such as walking or water exercise was recommended to maintain overall health and aid in weight control.   6.  Follow up as needed for subsequent injections.  7.  Patient was counseled to abstain from tobacco products.    Time :  16  min

## 2022-07-14 ENCOUNTER — OFFICE VISIT (OUTPATIENT)
Dept: INTERNAL MEDICINE | Facility: CLINIC | Age: 87
End: 2022-07-14

## 2022-07-14 VITALS
WEIGHT: 148.6 LBS | BODY MASS INDEX: 27.34 KG/M2 | SYSTOLIC BLOOD PRESSURE: 138 MMHG | HEIGHT: 62 IN | DIASTOLIC BLOOD PRESSURE: 84 MMHG | HEART RATE: 67 BPM | OXYGEN SATURATION: 98 %

## 2022-07-14 DIAGNOSIS — F33.9 MONOPOLAR DEPRESSION: Chronic | ICD-10-CM

## 2022-07-14 DIAGNOSIS — J30.2 SEASONAL ALLERGIC RHINITIS, UNSPECIFIED TRIGGER: Chronic | ICD-10-CM

## 2022-07-14 DIAGNOSIS — I10 PRIMARY HYPERTENSION: Primary | Chronic | ICD-10-CM

## 2022-07-14 DIAGNOSIS — G89.29 CHRONIC BILATERAL LOW BACK PAIN WITH RIGHT-SIDED SCIATICA: Chronic | ICD-10-CM

## 2022-07-14 DIAGNOSIS — E55.9 VITAMIN D DEFICIENCY: Chronic | ICD-10-CM

## 2022-07-14 DIAGNOSIS — R73.03 PREDIABETES: Chronic | ICD-10-CM

## 2022-07-14 DIAGNOSIS — M54.41 CHRONIC BILATERAL LOW BACK PAIN WITH RIGHT-SIDED SCIATICA: Chronic | ICD-10-CM

## 2022-07-14 DIAGNOSIS — E03.9 ACQUIRED HYPOTHYROIDISM: Chronic | ICD-10-CM

## 2022-07-14 PROCEDURE — 99214 OFFICE O/P EST MOD 30 MIN: CPT | Performed by: NURSE PRACTITIONER

## 2022-07-14 RX ORDER — FLUTICASONE PROPIONATE 50 MCG
2 SPRAY, SUSPENSION (ML) NASAL DAILY
Qty: 16 ML | Refills: 3 | Status: SHIPPED | OUTPATIENT
Start: 2022-07-14 | End: 2022-11-10

## 2022-07-15 LAB
ALBUMIN SERPL-MCNC: 4.2 G/DL (ref 3.6–4.6)
ALBUMIN/GLOB SERPL: 1.6 {RATIO} (ref 1.2–2.2)
ALP SERPL-CCNC: 73 IU/L (ref 44–121)
ALT SERPL-CCNC: 19 IU/L (ref 0–32)
AST SERPL-CCNC: 22 IU/L (ref 0–40)
BASOPHILS # BLD AUTO: 0 X10E3/UL (ref 0–0.2)
BASOPHILS NFR BLD AUTO: 1 %
BILIRUB SERPL-MCNC: 0.4 MG/DL (ref 0–1.2)
BUN SERPL-MCNC: 15 MG/DL (ref 8–27)
BUN/CREAT SERPL: 23 (ref 12–28)
CALCIUM SERPL-MCNC: 9.5 MG/DL (ref 8.7–10.3)
CHLORIDE SERPL-SCNC: 102 MMOL/L (ref 96–106)
CO2 SERPL-SCNC: 27 MMOL/L (ref 20–29)
CREAT SERPL-MCNC: 0.66 MG/DL (ref 0.57–1)
EGFRCR SERPLBLD CKD-EPI 2021: 85 ML/MIN/1.73
EOSINOPHIL # BLD AUTO: 0.1 X10E3/UL (ref 0–0.4)
EOSINOPHIL NFR BLD AUTO: 1 %
ERYTHROCYTE [DISTWIDTH] IN BLOOD BY AUTOMATED COUNT: 12.4 % (ref 11.7–15.4)
GLOBULIN SER CALC-MCNC: 2.6 G/DL (ref 1.5–4.5)
GLUCOSE SERPL-MCNC: 90 MG/DL (ref 65–99)
HBA1C MFR BLD: 5.9 % (ref 4.8–5.6)
HCT VFR BLD AUTO: 41.4 % (ref 34–46.6)
HGB BLD-MCNC: 13.4 G/DL (ref 11.1–15.9)
IMM GRANULOCYTES # BLD AUTO: 0 X10E3/UL (ref 0–0.1)
IMM GRANULOCYTES NFR BLD AUTO: 0 %
LYMPHOCYTES # BLD AUTO: 1.9 X10E3/UL (ref 0.7–3.1)
LYMPHOCYTES NFR BLD AUTO: 33 %
MCH RBC QN AUTO: 30.7 PG (ref 26.6–33)
MCHC RBC AUTO-ENTMCNC: 32.4 G/DL (ref 31.5–35.7)
MCV RBC AUTO: 95 FL (ref 79–97)
MONOCYTES # BLD AUTO: 0.5 X10E3/UL (ref 0.1–0.9)
MONOCYTES NFR BLD AUTO: 8 %
NEUTROPHILS # BLD AUTO: 3.3 X10E3/UL (ref 1.4–7)
NEUTROPHILS NFR BLD AUTO: 57 %
PLATELET # BLD AUTO: 233 X10E3/UL (ref 150–450)
POTASSIUM SERPL-SCNC: 4.2 MMOL/L (ref 3.5–5.2)
PROT SERPL-MCNC: 6.8 G/DL (ref 6–8.5)
RBC # BLD AUTO: 4.37 X10E6/UL (ref 3.77–5.28)
SODIUM SERPL-SCNC: 142 MMOL/L (ref 134–144)
TSH SERPL DL<=0.005 MIU/L-ACNC: 0.93 UIU/ML (ref 0.45–4.5)
WBC # BLD AUTO: 5.8 X10E3/UL (ref 3.4–10.8)

## 2022-07-22 DIAGNOSIS — M54.16 RIGHT LUMBAR RADICULOPATHY: ICD-10-CM

## 2022-07-22 DIAGNOSIS — R52 DIFFUSE PAIN: ICD-10-CM

## 2022-07-22 DIAGNOSIS — M79.604 PAIN OF RIGHT LOWER EXTREMITY: ICD-10-CM

## 2022-07-22 DIAGNOSIS — G44.039 EPISODIC PAROXYSMAL HEMICRANIA, NOT INTRACTABLE: ICD-10-CM

## 2022-07-22 NOTE — TELEPHONE ENCOUNTER
Caller: Travon Rider    Relationship: Emergency Contact    Best call back number: [phone]    Requested Prescriptions:   Requested Prescriptions     Pending Prescriptions Disp Refills   • gabapentin (NEURONTIN) 600 MG tablet 30 tablet 5     Sig: Take 1 tablet by mouth Every Night.        Pharmacy where request should be sent: [id number] [address] [place] [address] & [facility] [phone] [facility] [phone] FX     Additional details provided by patient: PATIENT IS OUT OF THIS MEDICATION. PHARMACY IS CLOSED ON THE WEEKENDS. ETHAN STATES PATIENT NEEDS TO  THE REFILL TODAY.    Does the patient have less than a 3 day supply:  [x] Yes  [] No    John Edmondson Rep   07/22/22 13:34 EDT

## 2022-07-24 NOTE — ASSESSMENT & PLAN NOTE
Hypertension is unchanged.  Continue current treatment regimen.  Dietary sodium restriction.  Blood pressure will be reassessed at the next regular appointment.  She will continue losartan which she is tolerating well.

## 2022-07-24 NOTE — PROGRESS NOTES
"Chief Complaint  Establish Care, Hypertension, Hypothyroidism, and Depression    Subjective        Kajal Severino presents to [facility] to establish care and to follow-up on hypertension, hypothyroidism and depression.    Patient presents to establish care. Previous medical history discussed with patient in details and reflected in problem list.  She presents with her daughter today as she has good support from her family, lives independently. She is feeling well without chest pain and/or shortness of breath.  She did receive a lumbar epidural in June which has been helpful.     Objective   Vital Signs:  /84 (BP Location: Left arm, Patient Position: Sitting, Cuff Size: Adult)   Pulse 67   Ht 157.5 cm (62\")   Wt 67.4 kg (148 lb 9.6 oz)   SpO2 98%   BMI 27.18 kg/m²   Estimated body mass index is 27.18 kg/m² as calculated from the following:    Height as of this encounter: 157.5 cm (62\").    Weight as of this encounter: 67.4 kg (148 lb 9.6 oz).          Physical Exam  Constitutional:       Appearance: She is well-developed. She is not ill-appearing.   HENT:      Head: Normocephalic.      Right Ear: Hearing, tympanic membrane and external ear normal.      Left Ear: Hearing, tympanic membrane and external ear normal.      Nose: Nose normal. No nasal deformity, mucosal edema or rhinorrhea.      Right Sinus: No maxillary sinus tenderness or frontal sinus tenderness.      Left Sinus: No maxillary sinus tenderness or frontal sinus tenderness.      Mouth/Throat:      Dentition: Normal dentition.   Eyes:      General: Lids are normal.         Right eye: No discharge.         Left eye: No discharge.      Conjunctiva/sclera: Conjunctivae normal.      Right eye: No exudate.     Left eye: No exudate.  Neck:      Thyroid: No thyroid mass or thyromegaly.      Vascular: No carotid bruit.      Trachea: Trachea normal.   Cardiovascular:      Rate and Rhythm: Regular rhythm.      Pulses: Normal " pulses.      Heart sounds: Normal heart sounds. No murmur heard.  Pulmonary:      Effort: No respiratory distress.      Breath sounds: Normal breath sounds. No decreased breath sounds, wheezing, rhonchi or rales.   Abdominal:      General: Bowel sounds are normal.      Palpations: Abdomen is soft.      Tenderness: There is no abdominal tenderness.   Musculoskeletal:      Cervical back: Normal range of motion. No edema.   Lymphadenopathy:      Head:      Right side of head: No submental, submandibular, tonsillar, preauricular, posterior auricular or occipital adenopathy.      Left side of head: No submental, submandibular, tonsillar, preauricular, posterior auricular or occipital adenopathy.   Skin:     General: Skin is warm and dry.      Nails: There is no clubbing.   Neurological:      Mental Status: She is alert.   Psychiatric:         Behavior: Behavior is cooperative.        Result Review :  The following data was reviewed by: HAILY Pisano on 07/14/2022:  Common labs    Common Labsle 1/13/22 1/13/22 1/13/22 1/13/22 7/14/22 7/14/22 7/14/22    1032 1032 1032 1032 1131 1131 1131   Glucose  98     90   BUN  21     15   Creatinine  0.74     0.66   eGFR Non  Am  74        eGFR African Am  85        Sodium  144     142   Potassium  4.2     4.2   Chloride  104     102   Calcium  9.1     9.5   Total Protein  6.5     6.8   Albumin  3.8     4.2   Total Bilirubin  0.3     0.4   Alkaline Phosphatase  84     73   AST (SGOT)  16     22   ALT (SGPT)  13     19   WBC   6.5   5.8    Hemoglobin   11.9   13.4    Hematocrit   36.7   41.4    Platelets   238   233    Total Cholesterol    167      Triglycerides    153 (A)      HDL Cholesterol    49      LDL Cholesterol     91      Hemoglobin A1C 5.8 (A)    5.9 (A)     (A) Abnormal value       Comments are available for some flowsheets but are not being displayed.           Data reviewed: Consultant notes pain mgmt 6/7/22          Assessment and Plan   Diagnoses and all  orders for this visit:    1. Primary hypertension (Primary)  Assessment & Plan:  Hypertension is unchanged.  Continue current treatment regimen.  Dietary sodium restriction.  Blood pressure will be reassessed at the next regular appointment.  She will continue losartan which she is tolerating well.    Orders:  -     TSH  -     CBC & Differential  -     Comprehensive Metabolic Panel    2. Vitamin D deficiency  Assessment & Plan:  She is currently managed on vitamin D over-the-counter, recheck level.      3. Prediabetes  Assessment & Plan:  She does try to follow a low-carb, low sugar diet; recheck A1c today.    Orders:  -     Hemoglobin A1c    4. Acquired hypothyroidism  Assessment & Plan:  She is currently on Synthroid 100 mcg daily for replacement, recheck TSH      5. Monopolar depression (HCC)  Assessment & Plan:  She is currently managed on Escitalopram daily which she is tolerating well.      6. Seasonal allergic rhinitis, unspecified trigger  Assessment & Plan:  She complains of persistent rhinorrhea with postnasal drainage which is improved with daily Flonase.    Orders:  -     fluticasone (FLONASE) 50 MCG/ACT nasal spray; 2 sprays by Each Nare route Daily.  Dispense: 16 mL; Refill: 3    7. Chronic bilateral low back pain with right-sided sciatica  Assessment & Plan:  She received a lumbar epidural 6/2022 for pain management which has been helpful.           Follow Up   Return in about 6 months (around 1/14/2023).  Patient was given instructions and counseling regarding her condition or for health maintenance advice. Please see specific information pulled into the AVS if appropriate.

## 2022-07-24 NOTE — ASSESSMENT & PLAN NOTE
She complains of persistent rhinorrhea with postnasal drainage which is improved with daily Flonase.

## 2022-07-25 RX ORDER — GABAPENTIN 100 MG/1
CAPSULE ORAL
Qty: 90 CAPSULE | OUTPATIENT
Start: 2022-07-25

## 2022-07-25 RX ORDER — GABAPENTIN 600 MG/1
600 TABLET ORAL NIGHTLY
Qty: 30 TABLET | Refills: 5 | Status: SHIPPED | OUTPATIENT
Start: 2022-07-25 | End: 2022-11-23 | Stop reason: DRUGHIGH

## 2022-08-16 DIAGNOSIS — I10 ESSENTIAL HYPERTENSION: ICD-10-CM

## 2022-08-16 RX ORDER — LOSARTAN POTASSIUM 100 MG/1
TABLET ORAL
Qty: 90 TABLET | Refills: 1 | Status: SHIPPED | OUTPATIENT
Start: 2022-08-16 | End: 2023-02-13

## 2022-09-08 ENCOUNTER — HOSPITAL ENCOUNTER (OUTPATIENT)
Dept: GENERAL RADIOLOGY | Facility: HOSPITAL | Age: 87
Discharge: HOME | End: 2022-09-08

## 2022-09-08 ENCOUNTER — ANESTHESIA EVENT (OUTPATIENT)
Dept: PAIN MEDICINE | Facility: HOSPITAL | Age: 87
End: 2022-09-08

## 2022-09-08 ENCOUNTER — ANESTHESIA (OUTPATIENT)
Dept: PAIN MEDICINE | Facility: HOSPITAL | Age: 87
End: 2022-09-08

## 2022-09-08 ENCOUNTER — HOSPITAL ENCOUNTER (OUTPATIENT)
Dept: PAIN MEDICINE | Facility: HOSPITAL | Age: 87
Discharge: HOME | End: 2022-09-08

## 2022-09-08 VITALS
RESPIRATION RATE: 16 BRPM | OXYGEN SATURATION: 96 % | SYSTOLIC BLOOD PRESSURE: 143 MMHG | HEART RATE: 65 BPM | TEMPERATURE: 97.1 F | DIASTOLIC BLOOD PRESSURE: 77 MMHG

## 2022-09-08 DIAGNOSIS — R52 PAIN: ICD-10-CM

## 2022-09-08 DIAGNOSIS — M54.16 RIGHT LUMBAR RADICULOPATHY: ICD-10-CM

## 2022-09-08 PROCEDURE — 77003 FLUOROGUIDE FOR SPINE INJECT: CPT

## 2022-09-08 PROCEDURE — 0 IOPAMIDOL 41 % SOLUTION: Performed by: ANESTHESIOLOGY

## 2022-09-08 PROCEDURE — 25010000002 METHYLPREDNISOLONE PER 80 MG: Performed by: ANESTHESIOLOGY

## 2022-09-08 RX ORDER — LIDOCAINE HYDROCHLORIDE 10 MG/ML
1 INJECTION, SOLUTION INFILTRATION; PERINEURAL ONCE AS NEEDED
Status: DISCONTINUED | OUTPATIENT
Start: 2022-09-08 | End: 2022-09-09 | Stop reason: HOSPADM

## 2022-09-08 RX ORDER — MIDAZOLAM HYDROCHLORIDE 1 MG/ML
1 INJECTION INTRAMUSCULAR; INTRAVENOUS AS NEEDED
Status: DISCONTINUED | OUTPATIENT
Start: 2022-09-08 | End: 2022-09-09 | Stop reason: HOSPADM

## 2022-09-08 RX ORDER — SODIUM CHLORIDE 0.9 % (FLUSH) 0.9 %
1-10 SYRINGE (ML) INJECTION AS NEEDED
Status: DISCONTINUED | OUTPATIENT
Start: 2022-09-08 | End: 2022-09-09 | Stop reason: HOSPADM

## 2022-09-08 RX ORDER — FENTANYL CITRATE 50 UG/ML
50 INJECTION, SOLUTION INTRAMUSCULAR; INTRAVENOUS AS NEEDED
Status: DISCONTINUED | OUTPATIENT
Start: 2022-09-08 | End: 2022-09-09 | Stop reason: HOSPADM

## 2022-09-08 RX ORDER — METHYLPREDNISOLONE ACETATE 80 MG/ML
80 INJECTION, SUSPENSION INTRA-ARTICULAR; INTRALESIONAL; INTRAMUSCULAR; SOFT TISSUE ONCE
Status: DISCONTINUED | OUTPATIENT
Start: 2022-09-08 | End: 2022-09-09 | Stop reason: HOSPADM

## 2022-09-08 RX ORDER — METHYLPREDNISOLONE ACETATE 80 MG/ML
80 INJECTION, SUSPENSION INTRA-ARTICULAR; INTRALESIONAL; INTRAMUSCULAR; SOFT TISSUE ONCE
Status: COMPLETED | OUTPATIENT
Start: 2022-09-08 | End: 2022-09-08

## 2022-09-08 RX ADMIN — IOPAMIDOL 10 ML: 408 INJECTION, SOLUTION INTRATHECAL at 09:04

## 2022-09-08 RX ADMIN — METHYLPREDNISOLONE ACETATE 80 MG: 80 INJECTION, SUSPENSION INTRA-ARTICULAR; INTRALESIONAL; INTRAMUSCULAR; SOFT TISSUE at 09:04

## 2022-09-08 NOTE — H&P
INTERVAL HISTORY:    The patient returns for another Lumbar epidural steroid injection today.  They have received at least 50% improvement since their last injection with a pain level of 4/10 at its worst recently.    Conservative measures tried in the interim rest    Current Outpatient Medications on File Prior to Encounter   Medication Sig Dispense Refill   • acetaminophen (TYLENOL) 650 MG 8 hr tablet Take 650 mg by mouth Every 8 (Eight) Hours As Needed for Mild Pain .     • escitalopram (LEXAPRO) 10 MG tablet TAKE 1 AND 1/2 TABLETS BY MOUTH DAILY (Patient taking differently: 10 mg.) 135 tablet 1   • fluticasone (FLONASE) 50 MCG/ACT nasal spray 2 sprays by Each Nare route Daily. 16 mL 3   • gabapentin (NEURONTIN) 600 MG tablet Take 1 tablet by mouth Every Night. 30 tablet 5   • ibuprofen (ADVIL,MOTRIN) 200 MG tablet Take 200 mg by mouth 2 (two) times a day.     • levothyroxine (SYNTHROID, LEVOTHROID) 100 MCG tablet TAKE 1 TABLET BY MOUTH DAILY 90 tablet 1   • losartan (COZAAR) 100 MG tablet TAKE 1 TABLET BY MOUTH DAILY 90 tablet 1     No current facility-administered medications on file prior to encounter.       Past Medical History:   Diagnosis Date   • Allergic rhinitis    • Back pain, lumbosacral    • Depression    • Erythrocytosis    • Fibrocystic breast    • H/O bone density study 11/14/2014   • History of anemia    • History of chest pain    • History of colonic polyps    • History of prior pregnancies     x7   • Hyperlipidemia    • Hypertension    • Hypothyroidism    • Low back pain    • Osteoarthritis    • Osteoporosis    • Peripheral neuropathy    • Potassium deficiency    • Shortness of breath        No hematologic infectious or constitutional symptoms  Negative screen for OLAYINKA      Exam:  /82 (BP Location: Left arm, Patient Position: Sitting)   Pulse 88   Temp 36.2 °C (97.1 °F) (Oral)   Resp 16   LMP  (LMP Unknown)   SpO2 98%   Airway Mallampatti 2  Alert and oriented      Diagnosis:  Post-Op  Diagnosis Codes:     * Lumbar radiculopathy [M54.16]    Plan:  Lumbar epidural steroid injection under fluoroscopic guidance    I have encouraged them to continue:  1.  Physical therapy exercises at home as prescribed by physical therapy or from the pain clinic handout.  Continuation of these exercises every day, or multiple times per week, even when the patient has good pain relief, was stressed to the patient as a preventative measure to decrease the frequency and severity of future pain episodes.  2.  Continue pain medicines as already prescribed.  If patient not currently taking any, it is recommended to begin Acetaminophen 1000 mg po q 8 hours.  If other medicines containing Acetaminophen are currently prescribed, maintain daily dose at 3000mg.    3.  If they can tolerate NSAIDS, it is recommended to take Ibuprofen 600 mg po q 6 hours for 7 days during pain exacerbations.   Alternatively, they may substitute an NSAID of their choice (e.g. Aleve)  4.  Heat and ice to the affected area as tolerated for pain control.   5.  Low impact exercise such as walking or water exercise was recommended to maintain overall health and aid in weight control.   6.  Follow up as needed for subsequent injections.

## 2022-09-08 NOTE — ANESTHESIA PROCEDURE NOTES
PAIN Epidural block      Patient reassessed immediately prior to procedure    Patient location during procedure: pain clinic  Start Time: 9/8/2022 9:00 AM  Stop Time: 9/8/2022 9:06 AM  Indication:procedure for pain  Performed By  Anesthesiologist: Jose Zhao MD  Preanesthetic Checklist  Completed: patient identified, IV checked, risks and benefits discussed, surgical consent, monitors and equipment checked, pre-op evaluation and timeout performed  Additional Notes  Diagnosis:  Post-Op Diagnosis Codes:     * Lumbar radiculopathy (M54.16)      Prep:  Pt Position:prone  Sterile Tech:gloves, mask and sterile barrier  Prep:chlorhexidine gluconate and isopropyl alcohol  Monitoring:blood pressure monitoring, continuous pulse oximetry and EKG  Procedure:Sedation: no     Approach:midline  Guidance: fluoroscopy  Location:lumbar  Level:L5-S1  Needle Type:Tuohy  Needle Gauge:20  Aspiration:negative  Test Dose:negative  Medications:  Preservative Free Saline:3mL  Isovue:2mL  Comments:Isovue dye was instilled and dye spread was confirmed to be consistent with epidural placement  Depomedrol:80 mg  Post Assessment:  Dressing:occlusive dressing applied  Pt Tolerance:patient tolerated the procedure well with no apparent complications  Complications:no            
no tingling/no difficulty bearing weight/no numbness/chest pain cough

## 2022-10-01 DIAGNOSIS — E03.9 HYPOTHYROIDISM, UNSPECIFIED TYPE: ICD-10-CM

## 2022-10-03 RX ORDER — LEVOTHYROXINE SODIUM 0.1 MG/1
TABLET ORAL
Qty: 90 TABLET | Refills: 1 | Status: SHIPPED | OUTPATIENT
Start: 2022-10-03 | End: 2023-01-10 | Stop reason: DRUGHIGH

## 2022-11-10 DIAGNOSIS — J30.2 SEASONAL ALLERGIC RHINITIS, UNSPECIFIED TRIGGER: Chronic | ICD-10-CM

## 2022-11-10 RX ORDER — FLUTICASONE PROPIONATE 50 MCG
SPRAY, SUSPENSION (ML) NASAL
Qty: 16 G | Refills: 0 | Status: SHIPPED | OUTPATIENT
Start: 2022-11-10 | End: 2022-12-15

## 2022-11-23 ENCOUNTER — TELEPHONE (OUTPATIENT)
Dept: INTERNAL MEDICINE | Facility: CLINIC | Age: 87
End: 2022-11-23

## 2022-11-23 DIAGNOSIS — M79.604 PAIN OF RIGHT LOWER EXTREMITY: ICD-10-CM

## 2022-11-23 DIAGNOSIS — G44.039 EPISODIC PAROXYSMAL HEMICRANIA, NOT INTRACTABLE: ICD-10-CM

## 2022-11-23 DIAGNOSIS — R52 DIFFUSE PAIN: ICD-10-CM

## 2022-11-23 RX ORDER — GABAPENTIN 300 MG/1
300 CAPSULE ORAL NIGHTLY
Qty: 30 CAPSULE | Refills: 1 | Status: SHIPPED | OUTPATIENT
Start: 2022-11-23 | End: 2023-01-23 | Stop reason: SDUPTHER

## 2022-11-23 RX ORDER — GABAPENTIN 600 MG/1
600 TABLET ORAL NIGHTLY
Qty: 30 TABLET | Refills: 5 | Status: CANCELLED | OUTPATIENT
Start: 2022-11-23

## 2022-11-23 NOTE — TELEPHONE ENCOUNTER
S/w Eliz    She reports patient is off balance in the mornings, and she suspects that it is the gabapentin    She said that she is due for a refill but she is asking for a decrease in dose and talk to her about weaning off at her next visit.    She said that if you do not want to do that, she is fine with that, she just wanted to ask before you refilled it.    Rx Refill Note  Requested Prescriptions     Pending Prescriptions Disp Refills   • gabapentin (NEURONTIN) 600 MG tablet 30 tablet 5     Sig: Take 1 tablet by mouth Every Night.      Last office visit with prescribing clinician: 7/14/2022      Next office visit with prescribing clinician: 1/17/2023   \    \   \  Myra Corral  11/23/22, 10:36 EST

## 2022-11-23 NOTE — TELEPHONE ENCOUNTER
Caller: Travon Rider    Relationship: Emergency Contact    Best call back number: [phone]    Requested Prescriptions:   Requested Prescriptions     Pending Prescriptions Disp Refills   • gabapentin (NEURONTIN) 600 MG tablet 30 tablet 5     Sig: Take 1 tablet by mouth Every Night.        Pharmacy where request should be sent: [id number] [address] [facility] [phone] [facility] [phone] FX     Additional details provided by patient: PATIENT DAUGHTER IS REQUESTING THAT HAILY CATRACHO DECREASE THE DOSAGE OF GABAPENTIN WITH THE MINDSET OF WEANING HER OFF OF GABAPENTIN    Does the patient have less than a 3 day supply:  [] Yes  [] No    John Galloway Rep   11/23/22 10:30 EST

## 2022-11-23 NOTE — TELEPHONE ENCOUNTER
Please notify pt I agree, gabapentin may be affecting her balance. I will send in a prescription for the 300 mg dose to the pharmacy. We will discuss this further at her next appointment.

## 2022-11-28 RX ORDER — ESCITALOPRAM OXALATE 10 MG/1
TABLET ORAL
Qty: 135 TABLET | Refills: 1 | Status: SHIPPED | OUTPATIENT
Start: 2022-11-28 | End: 2022-12-29

## 2022-11-30 ENCOUNTER — TELEPHONE (OUTPATIENT)
Dept: INTERNAL MEDICINE | Facility: CLINIC | Age: 87
End: 2022-11-30

## 2022-11-30 DIAGNOSIS — M54.41 CHRONIC BILATERAL LOW BACK PAIN WITH RIGHT-SIDED SCIATICA: Primary | Chronic | ICD-10-CM

## 2022-11-30 DIAGNOSIS — G89.29 CHRONIC BILATERAL LOW BACK PAIN WITH RIGHT-SIDED SCIATICA: Primary | Chronic | ICD-10-CM

## 2022-11-30 NOTE — TELEPHONE ENCOUNTER
----- Message from Kajal Severino sent at 11/29/2022  6:09 PM EST -----  Regarding: Pain Management Referral  Contact: [phone]  [place], I am sending this on behalf on my mom, Kajal Severino. I am her POA, daughter. She is due to get an epidural for back pain at [facility], but they need a need order.  Can you send this to them and I will schedule the procedure? You can call or email me if you have any questions.  Thank you, Keena Rider  [phone]  [email or web address]

## 2022-11-30 NOTE — TELEPHONE ENCOUNTER
----- Message from Kajal Severino sent at 11/29/2022  6:09 PM EST -----  Regarding: Pain Management Referral  Contact: [phone]  [place], I am sending this on behalf on my mom, Kajal Severino. I am her POA, daughter. She is due to get an epidural for back pain at [facility], but they need a need order.  Can you send this to them and I will schedule the procedure? You can call or email me if you have any questions.  Thank you, Keena Rider  [phone]  [email or web address]

## 2022-12-15 DIAGNOSIS — J30.2 SEASONAL ALLERGIC RHINITIS, UNSPECIFIED TRIGGER: Chronic | ICD-10-CM

## 2022-12-15 RX ORDER — FLUTICASONE PROPIONATE 50 MCG
SPRAY, SUSPENSION (ML) NASAL
Qty: 48 G | Refills: 1 | Status: SHIPPED | OUTPATIENT
Start: 2022-12-15

## 2022-12-20 ENCOUNTER — HOSPITAL ENCOUNTER (OUTPATIENT)
Dept: PAIN MEDICINE | Facility: HOSPITAL | Age: 87
Discharge: HOME | End: 2022-12-20

## 2022-12-20 ENCOUNTER — ANESTHESIA (OUTPATIENT)
Dept: PAIN MEDICINE | Facility: HOSPITAL | Age: 87
End: 2022-12-20

## 2022-12-20 ENCOUNTER — HOSPITAL ENCOUNTER (OUTPATIENT)
Dept: GENERAL RADIOLOGY | Facility: HOSPITAL | Age: 87
Discharge: HOME | End: 2022-12-20

## 2022-12-20 ENCOUNTER — ANESTHESIA EVENT (OUTPATIENT)
Dept: PAIN MEDICINE | Facility: HOSPITAL | Age: 87
End: 2022-12-20

## 2022-12-20 VITALS
TEMPERATURE: 98.9 F | OXYGEN SATURATION: 98 % | HEART RATE: 65 BPM | RESPIRATION RATE: 20 BRPM | DIASTOLIC BLOOD PRESSURE: 83 MMHG | SYSTOLIC BLOOD PRESSURE: 164 MMHG

## 2022-12-20 DIAGNOSIS — R52 PAIN: ICD-10-CM

## 2022-12-20 DIAGNOSIS — M99.53 INTERVERTEBRAL DISC STENOSIS OF NEURAL CANAL OF LUMBAR REGION: Primary | ICD-10-CM

## 2022-12-20 PROCEDURE — 0 IOPAMIDOL 41 % SOLUTION: Performed by: ANESTHESIOLOGY

## 2022-12-20 PROCEDURE — 77003 FLUOROGUIDE FOR SPINE INJECT: CPT

## 2022-12-20 PROCEDURE — 25010000002 METHYLPREDNISOLONE PER 80 MG: Performed by: ANESTHESIOLOGY

## 2022-12-20 RX ORDER — LIDOCAINE HYDROCHLORIDE 10 MG/ML
1 INJECTION, SOLUTION INFILTRATION; PERINEURAL ONCE AS NEEDED
Status: DISCONTINUED | OUTPATIENT
Start: 2022-12-20 | End: 2022-12-21 | Stop reason: HOSPADM

## 2022-12-20 RX ORDER — FENTANYL CITRATE 50 UG/ML
50 INJECTION, SOLUTION INTRAMUSCULAR; INTRAVENOUS AS NEEDED
Status: DISCONTINUED | OUTPATIENT
Start: 2022-12-20 | End: 2022-12-21 | Stop reason: HOSPADM

## 2022-12-20 RX ORDER — MIDAZOLAM HYDROCHLORIDE 1 MG/ML
1 INJECTION INTRAMUSCULAR; INTRAVENOUS AS NEEDED
Status: DISCONTINUED | OUTPATIENT
Start: 2022-12-20 | End: 2022-12-21 | Stop reason: HOSPADM

## 2022-12-20 RX ORDER — SODIUM CHLORIDE 0.9 % (FLUSH) 0.9 %
1-10 SYRINGE (ML) INJECTION AS NEEDED
Status: DISCONTINUED | OUTPATIENT
Start: 2022-12-20 | End: 2022-12-21 | Stop reason: HOSPADM

## 2022-12-20 RX ORDER — METHYLPREDNISOLONE ACETATE 80 MG/ML
80 INJECTION, SUSPENSION INTRA-ARTICULAR; INTRALESIONAL; INTRAMUSCULAR; SOFT TISSUE ONCE
Status: COMPLETED | OUTPATIENT
Start: 2022-12-20 | End: 2022-12-20

## 2022-12-20 RX ADMIN — IOPAMIDOL 10 ML: 408 INJECTION, SOLUTION INTRATHECAL at 09:47

## 2022-12-20 RX ADMIN — METHYLPREDNISOLONE ACETATE 80 MG: 80 INJECTION, SUSPENSION INTRA-ARTICULAR; INTRALESIONAL; INTRAMUSCULAR; SOFT TISSUE at 09:48

## 2022-12-20 NOTE — ANESTHESIA PROCEDURE NOTES
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 All questions were answered. The patient was discharged with appropriate instructions.  Prep:  Pt Position:prone  Sterile Tech:cap, gloves, mask and sterile barrier  Prep:chlorhexidine gluconate and isopropyl alcohol  Monitoring:blood pressure monitoring, continuous pulse oximetry and EKG  Procedure:Sedation: no     Approach:right paramedian  Guidance: fluoroscopy  Location:lumbar  Level:4-5 (Interlaminar)  Needle Type:Tuohy  Needle Gauge:20 G  Aspiration:negative  Medications:  Preservative Free Saline:3mL  Isovue:2mL  Comments:Isovue dye spread was consistent with epidural placement.Depomedrol:80 mg  Post Assessment:  Dressing:occlusive dressing applied  Pt Tolerance:patient tolerated the procedure well with no apparent complications  Complications:no

## 2022-12-20 NOTE — H&P
[facility]    History and Physical    Patient Name: Kajal Severino  :  1935  MRN:  [id number]  Date of Admission: 2022    Subjective     Patient is a 87 y.o. female presents with chief complaint of chronic, intermittent, severe low back pain.  Onset of symptoms was gradual starting 2 years ago.  Symptoms are associated/aggravated by activity or walking for more than a few minutes. Symptoms improve with pain medication and rest.  On a pain scale from 0-10, she rates her pain as a 7 while at rest and an 8 with activity.  She describes the pain as sharp in nature.  She has responded favorably to lumbar epidural steroid injections in the past.  She was referred to the clinic for a new series.    Her most recent lumbar MRI shows multilevel spondylosis and disc bulging causing foraminal narrowing and canal narrowing.    The following portions of the patients history were reviewed and updated as appropriate: current medications, allergies, past medical history, past surgical history, past family history, past social history and problem list                Objective     Past Medical History:   Past Medical History:   Diagnosis Date   • Allergic rhinitis    • Back pain, lumbosacral    • Depression    • Erythrocytosis    • Fibrocystic breast    • H/O bone density study 2014   • History of anemia    • History of chest pain    • History of colonic polyps    • History of prior pregnancies     x7   • Hyperlipidemia    • Hypertension    • Hypothyroidism    • Low back pain    • Osteoarthritis    • Osteoporosis    • Peripheral neuropathy    • Potassium deficiency    • Shortness of breath      Past Surgical History:   Past Surgical History:   Procedure Laterality Date   • BREAST BIOPSY Bilateral 1980    x3   •  SECTION N/A     x1   • COLONOSCOPY N/A 10/05/2012    + Diverticulosis and polyps   • EPIDURAL BLOCK     • PAP SMEAR N/A 11/15/2006     Family History:   Family History   Problem Relation Age of  Onset   • Heart disease Mother    • Hypertension Mother    • Diabetes Father    • Heart disease Father    • Hypertension Father    • Breast cancer Sister    • Leukemia Brother         CML   • Breast cancer Daughter    • Hypertension Daughter    • Colon cancer Brother    • Prostate cancer Brother    • Prostate cancer Brother    • Kidney cancer Brother    • Hypertension Daughter      Social History:   Social History     Socioeconomic History   • Marital status:    • Number of children: 4   • Years of education: High School   Tobacco Use   • Smoking status: Former     Packs/day: 0.25     Years: 15.00     Pack years: 3.75     Types: Cigarettes     Quit date:      Years since quittin.9   • Smokeless tobacco: Never   Substance and Sexual Activity   • Alcohol use: No   • Drug use: No   • Sexual activity: Defer     Partners: Male       Vital Signs Range for the last 24 hours  Temperature:     Temp Source:     BP:     Pulse:     Respirations:     SPO2:     O2 Amount (l/min):     O2 Devices     Weight:           --------------------------------------------------------------------------------    Current Outpatient Medications   Medication Sig Dispense Refill   • acetaminophen (TYLENOL) 650 MG 8 hr tablet Take 650 mg by mouth Every 8 (Eight) Hours As Needed for Mild Pain .     • escitalopram (LEXAPRO) 10 MG tablet TAKE 1 AND 1/2 TABLETS BY MOUTH DAILY 135 tablet 1   • fluticasone (FLONASE) 50 MCG/ACT nasal spray SHAKE LIQUID AND USE 2 SPRAYS IN EACH NOSTRIL DAILY 48 g 1   • gabapentin (NEURONTIN) 300 MG capsule Take 1 capsule by mouth Every Night. 30 capsule 1   • ibuprofen (ADVIL,MOTRIN) 200 MG tablet Take 200 mg by mouth 2 (two) times a day.     • levothyroxine (SYNTHROID, LEVOTHROID) 100 MCG tablet TAKE 1 TABLET BY MOUTH DAILY 90 tablet 1   • losartan (COZAAR) 100 MG tablet TAKE 1 TABLET BY MOUTH DAILY 90 tablet 1     No current facility-administered medications for this encounter.        --------------------------------------------------------------------------------  Assessment & Plan      Anesthesia Evaluation     Patient summary reviewed and Nursing notes reviewed   NPO Solid Status: > 8 hours  NPO Liquid Status: > 2 hours    Pain impairs ability to perform ADLs: Bathing  Modalities previously tried to control pain with limited effectiveness within the last 4-6 weeks: OTC medications     Airway   Mallampati: II  TM distance: >3 FB  Neck ROM: full  Dental - normal exam     Pulmonary - normal exam    breath sounds clear to auscultation  (+) a smoker Former,   Cardiovascular - normal exam    Rhythm: regular  Rate: normal    (+) hypertension, OCAMPO, hyperlipidemia,   (-) angina, orthopnea, PND      Neuro/Psych  (+) headaches, numbness, psychiatric history Depression,    GI/Hepatic/Renal/Endo    (+)   thyroid problem hypothyroidism    Musculoskeletal     Abdominal    Substance History - negative use     OB/GYN negative ob/gyn ROS         Other   arthritis,                 Diagnosis and Plan    Treatment Plan  ASA 3      Procedures: Lumbar Epidural Steroid Injection(LESI), With fluoroscopy,       Anesthetic plan and risks discussed with patient.        Alternative management options include physical therapy, medical management with nonsteroidal anti-inflammatory medications or narcotics, chiropractic manipulation and surgical intervention.    1.  Lumbar epidural steroid injections, up to 3, spaced 1-2 weeks apart.  If pain control is acceptable after 1 or 2 injections, it was discussed with the patient that they may return for the subsequent injections if and when their pain returns.  The risks were discussed with the patient including failure of relief, worsening pain, Headache (post dural puncture headache), bleeding (epidural hematoma) and infection (epidural abscess or skin infection).  2.  Physical therapy exercises at home as prescribed by physical therapy or from the pain clinic handout  (given to the patient).  Continuation of these exercises every day, or multiple times per week, even when the patient has good pain relief, was stressed to the patient as a preventative measure to decrease the frequency and severity of future pain episodes.  3.  Continue pain medicines as already prescribed.  If patient not currently taking any, it is recommended to begin Acetaminophen 1000 mg po q 8 hours.  If other medicines containing Acetaminophen are currently prescribed, maintain daily dose at 3000 mg.    4.  If they can tolerate NSAIDS, it is recommended to take Ibuprofen 600 mg po q 6 hours for 7 days during pain exacerbations.  Alternatively, they may substitute an NSAID of their choice (e.g. Aleve).  This may be taken at the same time as Acetaminophen.  5.  Heat and ice to the affected area as tolerated for pain control.  It was discussed that heating pads can cause burns.  6.  Daily low impact exercise such as walking or water exercise was recommended to maintain overall health and aid in weight control.   7.  Follow up as needed for subsequent injections.  8.  Patient was counseled to abstain from tobacco products.      Diagnosis     * Intervertebral disc stenosis of neural canal of lumbar region [M99.53]

## 2022-12-29 ENCOUNTER — OFFICE VISIT (OUTPATIENT)
Dept: INTERNAL MEDICINE | Facility: CLINIC | Age: 87
End: 2022-12-29

## 2022-12-29 VITALS
BODY MASS INDEX: 27.09 KG/M2 | WEIGHT: 147.2 LBS | HEART RATE: 73 BPM | DIASTOLIC BLOOD PRESSURE: 80 MMHG | HEIGHT: 62 IN | OXYGEN SATURATION: 96 % | SYSTOLIC BLOOD PRESSURE: 140 MMHG | TEMPERATURE: 97.3 F

## 2022-12-29 DIAGNOSIS — E55.9 VITAMIN D DEFICIENCY: Chronic | ICD-10-CM

## 2022-12-29 DIAGNOSIS — I10 PRIMARY HYPERTENSION: Chronic | ICD-10-CM

## 2022-12-29 DIAGNOSIS — F33.9 MONOPOLAR DEPRESSION: Chronic | ICD-10-CM

## 2022-12-29 DIAGNOSIS — Z00.00 MEDICARE ANNUAL WELLNESS VISIT, SUBSEQUENT: Primary | ICD-10-CM

## 2022-12-29 DIAGNOSIS — Z12.31 ENCOUNTER FOR SCREENING MAMMOGRAM FOR MALIGNANT NEOPLASM OF BREAST: ICD-10-CM

## 2022-12-29 DIAGNOSIS — M54.41 CHRONIC BILATERAL LOW BACK PAIN WITH RIGHT-SIDED SCIATICA: Chronic | ICD-10-CM

## 2022-12-29 DIAGNOSIS — G89.29 CHRONIC BILATERAL LOW BACK PAIN WITH RIGHT-SIDED SCIATICA: Chronic | ICD-10-CM

## 2022-12-29 DIAGNOSIS — E03.9 ACQUIRED HYPOTHYROIDISM: Chronic | ICD-10-CM

## 2022-12-29 LAB
ALBUMIN SERPL-MCNC: 4.1 G/DL (ref 3.5–5.2)
ALBUMIN/GLOB SERPL: 1.7 G/DL
ALP SERPL-CCNC: 66 U/L (ref 39–117)
ALT SERPL-CCNC: 17 U/L (ref 1–33)
AST SERPL-CCNC: 15 U/L (ref 1–32)
BASOPHILS # BLD AUTO: 0.05 10*3/MM3 (ref 0–0.2)
BASOPHILS NFR BLD AUTO: 0.8 % (ref 0–1.5)
BILIRUB SERPL-MCNC: 0.5 MG/DL (ref 0–1.2)
BUN SERPL-MCNC: 26 MG/DL (ref 8–23)
BUN/CREAT SERPL: 38.2 (ref 7–25)
CALCIUM SERPL-MCNC: 9.6 MG/DL (ref 8.6–10.5)
CHLORIDE SERPL-SCNC: 101 MMOL/L (ref 98–107)
CHOLEST SERPL-MCNC: 200 MG/DL (ref 0–200)
CO2 SERPL-SCNC: 32.1 MMOL/L (ref 22–29)
CREAT SERPL-MCNC: 0.68 MG/DL (ref 0.57–1)
EGFRCR SERPLBLD CKD-EPI 2021: 84.9 ML/MIN/1.73
EOSINOPHIL # BLD AUTO: 0.12 10*3/MM3 (ref 0–0.4)
EOSINOPHIL NFR BLD AUTO: 1.8 % (ref 0.3–6.2)
ERYTHROCYTE [DISTWIDTH] IN BLOOD BY AUTOMATED COUNT: 11.7 % (ref 12.3–15.4)
GLOBULIN SER CALC-MCNC: 2.4 GM/DL
GLUCOSE SERPL-MCNC: 92 MG/DL (ref 65–99)
HCT VFR BLD AUTO: 39.2 % (ref 34–46.6)
HDLC SERPL-MCNC: 57 MG/DL (ref 40–60)
HGB BLD-MCNC: 12.6 G/DL (ref 12–15.9)
IMM GRANULOCYTES # BLD AUTO: 0.01 10*3/MM3 (ref 0–0.05)
IMM GRANULOCYTES NFR BLD AUTO: 0.2 % (ref 0–0.5)
LDLC SERPL CALC-MCNC: 114 MG/DL (ref 0–100)
LYMPHOCYTES # BLD AUTO: 2.55 10*3/MM3 (ref 0.7–3.1)
LYMPHOCYTES NFR BLD AUTO: 38.4 % (ref 19.6–45.3)
MCH RBC QN AUTO: 31.3 PG (ref 26.6–33)
MCHC RBC AUTO-ENTMCNC: 32.1 G/DL (ref 31.5–35.7)
MCV RBC AUTO: 97.3 FL (ref 79–97)
MONOCYTES # BLD AUTO: 0.56 10*3/MM3 (ref 0.1–0.9)
MONOCYTES NFR BLD AUTO: 8.4 % (ref 5–12)
NEUTROPHILS # BLD AUTO: 3.35 10*3/MM3 (ref 1.7–7)
NEUTROPHILS NFR BLD AUTO: 50.4 % (ref 42.7–76)
NRBC BLD AUTO-RTO: 0 /100 WBC (ref 0–0.2)
PLATELET # BLD AUTO: 215 10*3/MM3 (ref 140–450)
POTASSIUM SERPL-SCNC: 4.4 MMOL/L (ref 3.5–5.2)
PROT SERPL-MCNC: 6.5 G/DL (ref 6–8.5)
RBC # BLD AUTO: 4.03 10*6/MM3 (ref 3.77–5.28)
SODIUM SERPL-SCNC: 139 MMOL/L (ref 136–145)
TRIGL SERPL-MCNC: 165 MG/DL (ref 0–150)
TSH SERPL DL<=0.005 MIU/L-ACNC: 9.55 UIU/ML (ref 0.27–4.2)
VLDLC SERPL CALC-MCNC: 29 MG/DL (ref 5–40)
WBC # BLD AUTO: 6.64 10*3/MM3 (ref 3.4–10.8)

## 2022-12-29 PROCEDURE — 1159F MED LIST DOCD IN RCRD: CPT | Performed by: NURSE PRACTITIONER

## 2022-12-29 PROCEDURE — G0439 PPPS, SUBSEQ VISIT: HCPCS | Performed by: NURSE PRACTITIONER

## 2022-12-29 PROCEDURE — 1170F FXNL STATUS ASSESSED: CPT | Performed by: NURSE PRACTITIONER

## 2022-12-29 PROCEDURE — 96160 PT-FOCUSED HLTH RISK ASSMT: CPT | Performed by: NURSE PRACTITIONER

## 2022-12-29 PROCEDURE — 1126F AMNT PAIN NOTED NONE PRSNT: CPT | Performed by: NURSE PRACTITIONER

## 2022-12-29 PROCEDURE — 99214 OFFICE O/P EST MOD 30 MIN: CPT | Performed by: NURSE PRACTITIONER

## 2022-12-29 RX ORDER — ESCITALOPRAM OXALATE 10 MG/1
10 TABLET ORAL DAILY
Qty: 90 TABLET | Refills: 1
Start: 2022-12-29

## 2022-12-29 NOTE — PROGRESS NOTES
The ABCs of the Annual Wellness Visit  Subsequent Medicare Wellness Visit    Subjective    Kajal Severino is a 87 y.o. female who presents for a Subsequent Medicare Wellness Visit.    The following portions of the patient's history were reviewed and   updated as appropriate: allergies, current medications, past family history, past medical history, past social history, past surgical history and problem list.    Compared to one year ago, the patient feels her physical   health is the same.    Compared to one year ago, the patient feels her mental   health is the same.    Recent Hospitalizations:  She was not admitted to the hospital during the last year.       Current Medical Providers:  Patient Care Team:  India Alanis APRN as PCP - General (Internal Medicine)  Danny Borjas MD as Consulting Physician (Orthopedic Surgery)  Radha Scruggs MD as Consulting Physician (Hematology and Oncology)  Jemima Calabrese MD as Consulting Physician (Neurology)    Outpatient Medications Prior to Visit   Medication Sig Dispense Refill   • acetaminophen (TYLENOL) 650 MG 8 hr tablet Take 650 mg by mouth Every 8 (Eight) Hours As Needed for Mild Pain .     • fluticasone (FLONASE) 50 MCG/ACT nasal spray SHAKE LIQUID AND USE 2 SPRAYS IN EACH NOSTRIL DAILY 48 g 1   • gabapentin (NEURONTIN) 300 MG capsule Take 1 capsule by mouth Every Night. 30 capsule 1   • ibuprofen (ADVIL,MOTRIN) 200 MG tablet Take 200 mg by mouth 2 (two) times a day.     • levothyroxine (SYNTHROID, LEVOTHROID) 100 MCG tablet TAKE 1 TABLET BY MOUTH DAILY 90 tablet 1   • losartan (COZAAR) 100 MG tablet TAKE 1 TABLET BY MOUTH DAILY 90 tablet 1   • escitalopram (LEXAPRO) 10 MG tablet TAKE 1 AND 1/2 TABLETS BY MOUTH DAILY 135 tablet 1     No facility-administered medications prior to visit.       No opioid medication identified on active medication list. I have reviewed chart for other potential  high risk medication/s and harmful drug interactions in the  "elderly.          Aspirin is not on active medication list.  Aspirin use is not indicated based on review of current medical condition/s. Risk of harm outweighs potential benefits.  .    Patient Active Problem List   Diagnosis   • Hypertension   • History of colonic polyps   • Hypothyroidism   • Allergic rhinitis   • Vitamin D deficiency   • Bee sting allergy   • Monopolar depression (HCC)   • Breast calcification, left   • Chronic bilateral low back pain with right-sided sciatica   • Nonintractable headache   • Prediabetes   • Intervertebral disc stenosis of neural canal of lumbar region     Advance Care Planning  Advance Directive is on file.  ACP discussion was held with the patient during this visit. Patient has an advance directive in EMR which is still valid.      Objective    Vitals:    22 1023   BP: 140/80   BP Location: Left arm   Patient Position: Sitting   Cuff Size: Adult   Pulse: 73   Temp: 97.3 °F (36.3 °C)   TempSrc: Temporal   SpO2: 96%   Weight: 66.8 kg (147 lb 3.2 oz)   Height: 157.5 cm (62\")   PainSc: 0-No pain     Estimated body mass index is 26.92 kg/m² as calculated from the following:    Height as of this encounter: 157.5 cm (62\").    Weight as of this encounter: 66.8 kg (147 lb 3.2 oz).    BMI is >= 25 and <30. (Overweight) The following options were offered after discussion;: nutrition counseling/recommendations      Does the patient have evidence of cognitive impairment? No          HEALTH RISK ASSESSMENT    Smoking Status:  Social History     Tobacco Use   Smoking Status Former   • Packs/day: 0.25   • Years: 15.00   • Pack years: 3.75   • Types: Cigarettes   • Quit date: 1995   • Years since quittin.0   Smokeless Tobacco Never     Alcohol Consumption:  Social History     Substance and Sexual Activity   Alcohol Use No     Fall Risk Screen:    STEADI Fall Risk Assessment was completed, and patient is at LOW risk for falls.Assessment completed on:2022    Depression " Screening:  PHQ-2/PHQ-9 Depression Screening 12/29/2022   Little Interest or Pleasure in Doing Things 0-->not at all   Feeling Down, Depressed or Hopeless 0-->not at all   PHQ-9: Brief Depression Severity Measure Score 0       Health Habits and Functional and Cognitive Screening:  Functional & Cognitive Status 12/29/2022   Do you have difficulty preparing food and eating? No   Do you have difficulty bathing yourself, getting dressed or grooming yourself? No   Do you have difficulty using the toilet? No   Do you have difficulty moving around from place to place? No   Do you have trouble with steps or getting out of a bed or a chair? No   Current Diet Well Balanced Diet   Dental Exam Up to date   Eye Exam Up to date   Exercise (times per week) 2 times per week   Current Exercises Include Walking   Current Exercise Activities Include -   Do you need help using the phone?  No   Are you deaf or do you have serious difficulty hearing?  No   Do you need help with transportation? No   Do you need help shopping? Yes   Do you need help preparing meals?  Yes   Do you need help with housework?  Yes   Do you need help with laundry? Yes   Do you need help taking your medications? No   Do you need help managing money? Yes   Do you ever drive or ride in a car without wearing a seat belt? No   Have you felt unusual stress, anger or loneliness in the last month? No   Who do you live with? Child   If you need help, do you have trouble finding someone available to you? No   Have you been bothered in the last four weeks by sexual problems? No   Do you have difficulty concentrating, remembering or making decisions? No       Age-appropriate Screening Schedule:  Refer to the list below for future screening recommendations based on patient's age, sex and/or medical conditions. Orders for these recommended tests are listed in the plan section. The patient has been provided with a written plan.    Health Maintenance   Topic Date Due   •  ZOSTER VACCINE (2 of 3) 05/13/2013   • DXA SCAN  12/13/2020   • LIPID PANEL  01/13/2023   • TDAP/TD VACCINES (2 - Td or Tdap) 03/18/2023   • INFLUENZA VACCINE  Completed   • MAMMOGRAM  Discontinued                CMS Preventative Services Quick Reference  Risk Factors Identified During Encounter  Immunizations Discussed/Encouraged: Shingrix and COVID19  The above risks/problems have been discussed with the patient.  Pertinent information has been shared with the patient in the After Visit Summary.  An After Visit Summary and PPPS were made available to the patient.    Follow Up:   Next Medicare Wellness visit to be scheduled in 1 year.       Additional E&M Note during same encounter follows:  Patient has multiple medical problems which are significant and separately identifiable that require additional work above and beyond the Medicare Wellness Visit.      Chief Complaint  Medicare Wellness-subsequent    Subjective        HPI  Kajal Severino is also being seen today regarding depression, chronic low back pain and to f/u on hypothyroidism.    We have titrated gabapentin to 300 mg qhs which she has tolerated well, states back pain has been recurrent but stable. She received a lumbar epidural last week which she tolerated well and notes improvement in low back pain. She is scheduled for a repeat epidural in March.    She has also decreased her Lexapro from 15 mg to 10 mg daily which she is also tolerating, denies exacerbation of her symptoms.    We decreased her Gabapentin and Lexapro due to balance concerns and fatigue.    Review of Systems   Constitutional: Positive for fatigue.   HENT: Positive for congestion, postnasal drip and rhinorrhea.    Respiratory: Negative for cough, chest tightness and shortness of breath.    Cardiovascular: Negative for chest pain, palpitations and leg swelling.   Gastrointestinal: Negative for abdominal pain.   Musculoskeletal: Positive for arthralgias, back pain, neck pain and neck  "stiffness.     Objective   Vital Signs:  /80 (BP Location: Left arm, Patient Position: Sitting, Cuff Size: Adult)   Pulse 73   Temp 97.3 °F (36.3 °C) (Temporal)   Ht 157.5 cm (62\")   Wt 66.8 kg (147 lb 3.2 oz)   SpO2 96%   BMI 26.92 kg/m²     Physical Exam  Constitutional:       Appearance: She is well-developed. She is not ill-appearing.   HENT:      Head: Normocephalic.      Right Ear: Hearing, tympanic membrane and external ear normal.      Left Ear: Hearing, tympanic membrane and external ear normal.      Nose: Nose normal. No nasal deformity, mucosal edema or rhinorrhea.      Right Sinus: No maxillary sinus tenderness or frontal sinus tenderness.      Left Sinus: No maxillary sinus tenderness or frontal sinus tenderness.      Mouth/Throat:      Dentition: Normal dentition.   Eyes:      General: Lids are normal.         Right eye: No discharge.         Left eye: No discharge.      Conjunctiva/sclera: Conjunctivae normal.      Right eye: No exudate.     Left eye: No exudate.  Neck:      Thyroid: No thyroid mass or thyromegaly.      Vascular: No carotid bruit.      Trachea: Trachea normal.   Cardiovascular:      Rate and Rhythm: Regular rhythm.      Pulses: Normal pulses.      Heart sounds: Normal heart sounds. No murmur heard.  Pulmonary:      Effort: No respiratory distress.      Breath sounds: Normal breath sounds. No decreased breath sounds, wheezing, rhonchi or rales.   Abdominal:      General: Bowel sounds are normal.      Palpations: Abdomen is soft.      Tenderness: There is no abdominal tenderness.   Musculoskeletal:      Cervical back: Normal range of motion. No edema.   Lymphadenopathy:      Head:      Right side of head: No submental, submandibular, tonsillar, preauricular, posterior auricular or occipital adenopathy.      Left side of head: No submental, submandibular, tonsillar, preauricular, posterior auricular or occipital adenopathy.   Skin:     General: Skin is warm and dry.      " Nails: There is no clubbing.   Neurological:      Mental Status: She is alert.   Psychiatric:         Behavior: Behavior is cooperative.        The following data was reviewed by: HAILY Pisano on 12/29/2022:  Common labs    Common Labs 1/13/22 1/13/22 1/13/22 1/13/22 7/14/22 7/14/22 7/14/22    1032 1032 1032 1032 1131 1131 1131   Glucose  98     90   BUN  21     15   Creatinine  0.74     0.66   eGFR Non  Am  74        eGFR African Am  85        Sodium  144     142   Potassium  4.2     4.2   Chloride  104     102   Calcium  9.1     9.5   Total Protein  6.5     6.8   Albumin  3.8     4.2   Total Bilirubin  0.3     0.4   Alkaline Phosphatase  84     73   AST (SGOT)  16     22   ALT (SGPT)  13     19   WBC   6.5   5.8    Hemoglobin   11.9   13.4    Hematocrit   36.7   41.4    Platelets   238   233    Total Cholesterol    167      Triglycerides    153 (A)      HDL Cholesterol    49      LDL Cholesterol     91      Hemoglobin A1C 5.8 (A)    5.9 (A)     (A) Abnormal value       Comments are available for some flowsheets but are not being displayed.                      Assessment and Plan   Diagnoses and all orders for this visit:    1. Medicare annual wellness visit, subsequent (Primary)    2. Acquired hypothyroidism  Assessment & Plan:  She is currently managed on Synthroid 100 mcg daily-recheck TSH today.    Orders:  -     TSH    3. Vitamin D deficiency  Assessment & Plan:  She is currently managed on Vitamin D daily-recheck level.      4. Primary hypertension  Assessment & Plan:  BP is stable on losartan daily which she will continue along with a low sodium diet.    Orders:  -     CBC & Differential  -     Comprehensive Metabolic Panel  -     Lipid Panel    5. Monopolar depression (HCC)  Assessment & Plan:  She has lowered Lexapro from 15 mg to 10 mg and is doing well with current regimen, denies side effects.    Orders:  -     escitalopram (LEXAPRO) 10 MG tablet; Take 1 tablet by mouth Daily.   Dispense: 90 tablet; Refill: 1    6. Chronic bilateral low back pain with right-sided sciatica  Assessment & Plan:  She is managed on lumbar epidurals through pain mgmt, gabapentin decreased to 300 mg qhs which has been helpful      7. Encounter for screening mammogram for malignant neoplasm of breast  -     Mammo Screening Digital Tomosynthesis Bilateral With CAD; Future    We discussed benefits vs. necessity of screening mammogram which she would like to receive, order placed.       Follow Up   Return in about 6 months (around 6/29/2023).  Patient was given instructions and counseling regarding her condition or for health maintenance advice. Please see specific information pulled into the AVS if appropriate.

## 2022-12-29 NOTE — ASSESSMENT & PLAN NOTE
She has lowered Lexapro from 15 mg to 10 mg and is doing well with current regimen, denies side effects.

## 2022-12-29 NOTE — PATIENT INSTRUCTIONS
Medicare Wellness  Personal Prevention Plan of Service     Date of Office Visit:    Encounter Provider:  HAILY Pisano  Place of Service:  [facility]  Patient Name: Kajal Severino  :  1935    As part of the Medicare Wellness portion of your visit today, we are providing you with this personalized preventive plan of services (PPPS). This plan is based upon recommendations of the [place] Preventive Services Task Force (USPSTF) and the Advisory Committee on Immunization Practices (ACIP).    This lists the preventive care services that should be considered, and provides dates of when you are due. Items listed as completed are up-to-date and do not require any further intervention.    Health Maintenance   Topic Date Due    ZOSTER VACCINE (2 of 3) 2013    DXA SCAN  2020    COVID-19 Vaccine (4 - Booster for Pfizer series) 2021    ANNUAL WELLNESS VISIT  2022    LIPID PANEL  2023    TDAP/TD VACCINES (2 - Td or Tdap) 2023    INFLUENZA VACCINE  Completed    Pneumococcal Vaccine 65+  Completed    MAMMOGRAM  Discontinued       Orders Placed This Encounter   Procedures    Mammo Screening Digital Tomosynthesis Bilateral With CAD     Standing Status:   Future     Standing Expiration Date:   2023     Scheduling Instructions:      [facility]-please call Keena ([phone])     Order Specific Question:   Reason for Exam:     Answer:   Screening    TSH     Order Specific Question:   Release to patient     Answer:   Routine Release    Comprehensive Metabolic Panel     Order Specific Question:   Release to patient     Answer:   Routine Release    Lipid Panel    CBC & Differential     Order Specific Question:   Manual Differential     Answer:   No       Return in about 6 months (around 2023).

## 2022-12-29 NOTE — ASSESSMENT & PLAN NOTE
She is managed on lumbar epidurals through pain mgmt, gabapentin decreased to 300 mg qhs which has been helpful

## 2023-01-10 DIAGNOSIS — E03.9 ACQUIRED HYPOTHYROIDISM: Primary | ICD-10-CM

## 2023-01-10 RX ORDER — LEVOTHYROXINE SODIUM 112 UG/1
112 TABLET ORAL DAILY
Qty: 90 TABLET | Refills: 1 | Status: SHIPPED | OUTPATIENT
Start: 2023-01-10

## 2023-01-23 DIAGNOSIS — R52 DIFFUSE PAIN: ICD-10-CM

## 2023-01-23 RX ORDER — GABAPENTIN 300 MG/1
300 CAPSULE ORAL NIGHTLY
Qty: 90 CAPSULE | Refills: 1 | Status: SHIPPED | OUTPATIENT
Start: 2023-01-23

## 2023-01-23 NOTE — TELEPHONE ENCOUNTER
Rx Refill Note  Requested Prescriptions     Pending Prescriptions Disp Refills   • gabapentin (NEURONTIN) 300 MG capsule 30 capsule 1     Sig: Take 1 capsule by mouth Every Night.      Last office visit with prescribing clinician: 12/29/2022   Last telemedicine visit with prescribing clinician: 6/28/2023   Next office visit with prescribing clinician: 6/28/2023                         Would you like a call back once the refill request has been completed: [] Yes [] No    If the office needs to give you a call back, can they leave a voicemail: [] Yes [] No    Rosa Fritz  01/23/23, 10:38 EST

## 2023-01-23 NOTE — TELEPHONE ENCOUNTER
Caller: Travon Rider    Relationship: Emergency Contact    Best call back number: [phone]    Requested Prescriptions:   Requested Prescriptions     Pending Prescriptions Disp Refills   • gabapentin (NEURONTIN) 300 MG capsule 30 capsule 1     Sig: Take 1 capsule by mouth Every Night.        Pharmacy where request should be sent: [id number] [address] [phone] [facility] [phone] FX     Additional details provided by patient: PATIENTS DAUGHTER STATED THAT PATIENT IS COMPLETELY OUT OF THIS MEDICATION.    SHE STATED THAT THE PHARMACY IS NEEDING AN APPROVAL.    Does the patient have less than a 3 day supply:  [x] Yes  [] No    Would you like a call back once the refill request has been completed: [x] Yes [] No    If the office needs to give you a call back, can they leave a voicemail: [x] Yes [] No    John Pappas Rep   01/23/23 09:58 EST

## 2023-02-13 DIAGNOSIS — I10 ESSENTIAL HYPERTENSION: ICD-10-CM

## 2023-02-13 RX ORDER — LOSARTAN POTASSIUM 100 MG/1
TABLET ORAL
Qty: 90 TABLET | Refills: 1 | Status: SHIPPED | OUTPATIENT
Start: 2023-02-13

## 2023-03-22 ENCOUNTER — HOSPITAL ENCOUNTER (OUTPATIENT)
Dept: GENERAL RADIOLOGY | Facility: HOSPITAL | Age: 88
Discharge: HOME | End: 2023-03-22
Payer: MEDICARE

## 2023-03-22 ENCOUNTER — HOSPITAL ENCOUNTER (OUTPATIENT)
Dept: PAIN MEDICINE | Facility: HOSPITAL | Age: 88
Discharge: HOME | End: 2023-03-22
Payer: MEDICARE

## 2023-03-22 ENCOUNTER — ANESTHESIA (OUTPATIENT)
Dept: PAIN MEDICINE | Facility: HOSPITAL | Age: 88
End: 2023-03-22
Payer: MEDICARE

## 2023-03-22 ENCOUNTER — ANESTHESIA EVENT (OUTPATIENT)
Dept: PAIN MEDICINE | Facility: HOSPITAL | Age: 88
End: 2023-03-22
Payer: MEDICARE

## 2023-03-22 VITALS
OXYGEN SATURATION: 97 % | HEART RATE: 68 BPM | TEMPERATURE: 97.1 F | DIASTOLIC BLOOD PRESSURE: 83 MMHG | SYSTOLIC BLOOD PRESSURE: 134 MMHG | RESPIRATION RATE: 14 BRPM

## 2023-03-22 DIAGNOSIS — R52 PAIN: ICD-10-CM

## 2023-03-22 DIAGNOSIS — M99.53 INTERVERTEBRAL DISC STENOSIS OF NEURAL CANAL OF LUMBAR REGION: ICD-10-CM

## 2023-03-22 PROCEDURE — 25010000002 METHYLPREDNISOLONE PER 80 MG: Performed by: ANESTHESIOLOGY

## 2023-03-22 PROCEDURE — 25510000001 IOPAMIDOL 41 % SOLUTION: Performed by: ANESTHESIOLOGY

## 2023-03-22 PROCEDURE — 77003 FLUOROGUIDE FOR SPINE INJECT: CPT

## 2023-03-22 RX ORDER — FENTANYL CITRATE 50 UG/ML
50 INJECTION, SOLUTION INTRAMUSCULAR; INTRAVENOUS AS NEEDED
Status: DISCONTINUED | OUTPATIENT
Start: 2023-03-22 | End: 2023-03-23 | Stop reason: HOSPADM

## 2023-03-22 RX ORDER — MIDAZOLAM HYDROCHLORIDE 1 MG/ML
1 INJECTION INTRAMUSCULAR; INTRAVENOUS
Status: DISCONTINUED | OUTPATIENT
Start: 2023-03-22 | End: 2023-03-23 | Stop reason: HOSPADM

## 2023-03-22 RX ORDER — METHYLPREDNISOLONE ACETATE 80 MG/ML
80 INJECTION, SUSPENSION INTRA-ARTICULAR; INTRALESIONAL; INTRAMUSCULAR; SOFT TISSUE ONCE
Status: COMPLETED | OUTPATIENT
Start: 2023-03-22 | End: 2023-03-22

## 2023-03-22 RX ORDER — LIDOCAINE HYDROCHLORIDE 10 MG/ML
1 INJECTION, SOLUTION INFILTRATION; PERINEURAL ONCE
Status: DISCONTINUED | OUTPATIENT
Start: 2023-03-22 | End: 2023-03-23 | Stop reason: HOSPADM

## 2023-03-22 RX ADMIN — IOPAMIDOL 10 ML: 408 INJECTION, SOLUTION INTRATHECAL at 09:58

## 2023-03-22 RX ADMIN — METHYLPREDNISOLONE ACETATE 80 MG: 80 INJECTION, SUSPENSION INTRA-ARTICULAR; INTRALESIONAL; INTRAMUSCULAR; SOFT TISSUE at 09:58

## 2023-03-22 NOTE — ANESTHESIA PROCEDURE NOTES
PAIN Epidural block    Pre-sedation assessment completed: 3/22/2023 9:55 AM    Patient reassessed immediately prior to procedure    Patient location during procedure: pain clinic  Start Time: 3/22/2023 9:55 AM  Stop Time: 3/22/2023 10:05 AM  Indication:at surgeon's request and procedure for pain  Performed By  Anesthesiologist: Theodore Lo MD  Preanesthetic Checklist  Completed: patient identified, IV checked, site marked, risks and benefits discussed, surgical consent, monitors and equipment checked, pre-op evaluation and timeout performed  Additional Notes  Dx:  Post-Op Diagnosis Codes:     * 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)  80 mg depomedrol in epid    Plan : return to clinic as needed  Prep:  Pt Position:prone (prone)  Sterile Tech:cap, gloves, mask and sterile barrier  Prep:chlorhexidine gluconate and isopropyl alcohol  Monitoring:blood pressure monitoring, EKG and continuous pulse oximetry  Procedure:Sedation: no     Approach:midline  Guidance: fluoroscopy and c arm pa and lat and loss of resistance  Location:lumbar  Level:4-5 (interlaminar)  Needle Type:[email or web address]  Needle Gauge:20  Aspiration:negative  Medications:  Preservative Free Saline:3mL  Isovue:2mL  Depomedrol:80 mg  Post Assessment:  Pt Tolerance:patient tolerated the procedure well with no apparent complications  Complications:no

## 2023-03-22 NOTE — DISCHARGE INSTRUCTIONS

## 2023-03-22 NOTE — H&P
INTERVAL HISTORY:    The patient returns for another Lumbar epidural steroid injection 2 today.  They have received 60 % improvement since their last injection with a pain level of 6/10 at its worst recently.    Conservative measures tried in the interim. Daily activities are still affected by the pain.    Radiology reports and/or previous notes have been reviewed and are consistent with their diagnosis of Post-Op Diagnosis Codes:     * Intervertebral disc stenosis of neural canal of lumbar region [M99.53]    Alert and oriented  MP - 2  Lungs - clear  CV - rrr    Diagnosis:  Post-Op Diagnosis Codes:     * Intervertebral disc stenosis of neural canal of lumbar region [M99.53]      Plan:  Lumbar epidural steroid injection under fluoroscopic guidance        Target : L4-5    I have encouraged them to continue:  1.  Physical therapy exercises at home as prescribed by physical therapy or from the pain clinic handout (given to the patient).  Continuation of these exercises every day, or multiple times per week, even when the patient has good pain relief, was stressed to the patient as a preventative measure to decrease the frequency and severity of future pain episodes.  2.  Continue pain medicines as already prescribed.  If patient not currently taking any, it is recommended to begin Acetaminophen 1000 mg po q 8 hours.  If other medicines containing Acetaminophen are currently prescribed, maintain daily dose at 3000mg.    3.  If they can tolerate NSAIDS, it is recommended to take Ibuprofen 600 mg po q 6 hours for 7 days during pain exacerbations.   Alternatively, they may substitute an NSAID of their choice (e.g. Aleve)  4.  Heat and ice to the affected area as tolerated for pain control.  It was discussed that heating pads can cause burns.  5.  Low impact exercise such as walking or water exercise was recommended to maintain overall health and aid in weight control.   6.  Follow up as needed for subsequent  injections.  7.  Patient was counseled to abstain from tobacco products.    Time : 16   min

## 2023-05-31 ENCOUNTER — HOSPITAL ENCOUNTER (OUTPATIENT)
Dept: MAMMOGRAPHY | Facility: HOSPITAL | Age: 88
Discharge: HOME | End: 2023-05-31
Admitting: NURSE PRACTITIONER

## 2023-05-31 DIAGNOSIS — Z12.31 ENCOUNTER FOR SCREENING MAMMOGRAM FOR MALIGNANT NEOPLASM OF BREAST: ICD-10-CM

## 2023-05-31 PROCEDURE — 77067 SCR MAMMO BI INCL CAD: CPT

## 2023-05-31 PROCEDURE — 77063 BREAST TOMOSYNTHESIS BI: CPT

## 2023-06-01 ENCOUNTER — PATIENT MESSAGE (OUTPATIENT)
Dept: INTERNAL MEDICINE | Facility: CLINIC | Age: 88
End: 2023-06-01

## 2023-06-01 DIAGNOSIS — Z78.0 POSTMENOPAUSAL: Primary | ICD-10-CM

## 2023-06-01 NOTE — TELEPHONE ENCOUNTER
From: Kajal Severino  To: India Alanis  Sent: 6/1/2023 1:51 PM EDT  Subject: Kajal Severino had mammogram 5/31. I am unable to schedule DEXA as the order is not in the system. I think this is an oversight as it was discussed at last visit.   She would also like to be seen sooner than scheduled appt which is scheduled 6/28 if anything opens.  Thank you, Keena Rider (daughter/POA)

## 2023-06-02 NOTE — TELEPHONE ENCOUNTER
Order for DEXA signed. Thanks for clarifying why she needs an earlier appointment, we can work in next week if needed.

## 2023-06-07 ENCOUNTER — OFFICE VISIT (OUTPATIENT)
Dept: INTERNAL MEDICINE | Facility: CLINIC | Age: 88
End: 2023-06-07
Payer: MEDICARE

## 2023-06-07 VITALS
TEMPERATURE: 98 F | WEIGHT: 148.8 LBS | SYSTOLIC BLOOD PRESSURE: 160 MMHG | BODY MASS INDEX: 27.38 KG/M2 | DIASTOLIC BLOOD PRESSURE: 90 MMHG | HEIGHT: 62 IN | HEART RATE: 80 BPM | OXYGEN SATURATION: 95 %

## 2023-06-07 DIAGNOSIS — M99.53 INTERVERTEBRAL DISC STENOSIS OF NEURAL CANAL OF LUMBAR REGION: Primary | ICD-10-CM

## 2023-06-07 DIAGNOSIS — E03.9 ACQUIRED HYPOTHYROIDISM: Chronic | ICD-10-CM

## 2023-06-07 DIAGNOSIS — I10 PRIMARY HYPERTENSION: Chronic | ICD-10-CM

## 2023-06-07 DIAGNOSIS — F33.9 MONOPOLAR DEPRESSION: ICD-10-CM

## 2023-06-07 DIAGNOSIS — R51.9 NONINTRACTABLE HEADACHE, UNSPECIFIED CHRONICITY PATTERN, UNSPECIFIED HEADACHE TYPE: ICD-10-CM

## 2023-06-07 PROCEDURE — 1160F RVW MEDS BY RX/DR IN RCRD: CPT | Performed by: NURSE PRACTITIONER

## 2023-06-07 PROCEDURE — 1159F MED LIST DOCD IN RCRD: CPT | Performed by: NURSE PRACTITIONER

## 2023-06-07 PROCEDURE — 99214 OFFICE O/P EST MOD 30 MIN: CPT | Performed by: NURSE PRACTITIONER

## 2023-06-07 RX ORDER — CETIRIZINE HYDROCHLORIDE 10 MG/1
10 TABLET ORAL DAILY
Qty: 10 TABLET
Start: 2023-06-07 | End: 2023-06-17

## 2023-06-07 RX ORDER — MELOXICAM 7.5 MG/1
7.5 TABLET ORAL DAILY
Qty: 30 TABLET | Refills: 2 | Status: SHIPPED | OUTPATIENT
Start: 2023-06-07

## 2023-06-07 RX ORDER — GUAIFENESIN 600 MG/1
600 TABLET, EXTENDED RELEASE ORAL EVERY 12 HOURS SCHEDULED
Qty: 14 TABLET
Start: 2023-06-07 | End: 2023-06-14

## 2023-06-08 LAB
ALBUMIN SERPL-MCNC: 4.3 G/DL (ref 3.6–4.6)
ALBUMIN/GLOB SERPL: 1.7 {RATIO} (ref 1.2–2.2)
ALP SERPL-CCNC: 73 IU/L (ref 44–121)
ALT SERPL-CCNC: 15 IU/L (ref 0–32)
AST SERPL-CCNC: 19 IU/L (ref 0–40)
BASOPHILS # BLD AUTO: 0 X10E3/UL (ref 0–0.2)
BASOPHILS NFR BLD AUTO: 1 %
BILIRUB SERPL-MCNC: 0.3 MG/DL (ref 0–1.2)
BUN SERPL-MCNC: 15 MG/DL (ref 8–27)
BUN/CREAT SERPL: 21 (ref 12–28)
CALCIUM SERPL-MCNC: 9.7 MG/DL (ref 8.7–10.3)
CHLORIDE SERPL-SCNC: 102 MMOL/L (ref 96–106)
CHOLEST SERPL-MCNC: 202 MG/DL (ref 100–199)
CO2 SERPL-SCNC: 27 MMOL/L (ref 20–29)
CREAT SERPL-MCNC: 0.72 MG/DL (ref 0.57–1)
EGFRCR SERPLBLD CKD-EPI 2021: 81 ML/MIN/1.73
EOSINOPHIL # BLD AUTO: 0.2 X10E3/UL (ref 0–0.4)
EOSINOPHIL NFR BLD AUTO: 2 %
ERYTHROCYTE [DISTWIDTH] IN BLOOD BY AUTOMATED COUNT: 12.6 % (ref 11.7–15.4)
GLOBULIN SER CALC-MCNC: 2.6 G/DL (ref 1.5–4.5)
GLUCOSE SERPL-MCNC: 72 MG/DL (ref 70–99)
GLUCOSE UR QL STRIP: NORMAL
HCT VFR BLD AUTO: 40.3 % (ref 34–46.6)
HDLC SERPL-MCNC: 49 MG/DL
HGB BLD-MCNC: 13.3 G/DL (ref 11.1–15.9)
IMM GRANULOCYTES # BLD AUTO: 0 X10E3/UL (ref 0–0.1)
IMM GRANULOCYTES NFR BLD AUTO: 0 %
KETONES UR QL STRIP: NORMAL
LDLC SERPL CALC-MCNC: 104 MG/DL (ref 0–99)
LYMPHOCYTES # BLD AUTO: 2 X10E3/UL (ref 0.7–3.1)
LYMPHOCYTES NFR BLD AUTO: 29 %
MCH RBC QN AUTO: 30.8 PG (ref 26.6–33)
MCHC RBC AUTO-ENTMCNC: 33 G/DL (ref 31.5–35.7)
MCV RBC AUTO: 93 FL (ref 79–97)
MONOCYTES # BLD AUTO: 0.6 X10E3/UL (ref 0.1–0.9)
MONOCYTES NFR BLD AUTO: 9 %
NEUTROPHILS # BLD AUTO: 4 X10E3/UL (ref 1.4–7)
NEUTROPHILS NFR BLD AUTO: 59 %
PH UR STRIP: NORMAL [PH]
PLATELET # BLD AUTO: 248 X10E3/UL (ref 150–450)
POTASSIUM SERPL-SCNC: 4.1 MMOL/L (ref 3.5–5.2)
PROT SERPL-MCNC: 6.9 G/DL (ref 6–8.5)
PROT UR QL STRIP: NORMAL
RBC # BLD AUTO: 4.32 X10E6/UL (ref 3.77–5.28)
SODIUM SERPL-SCNC: 142 MMOL/L (ref 134–144)
SP GR UR STRIP: NORMAL
SPECIMEN STATUS: NORMAL
TRIGL SERPL-MCNC: 291 MG/DL (ref 0–149)
TSH SERPL DL<=0.005 MIU/L-ACNC: 9.6 UIU/ML (ref 0.45–4.5)
VLDLC SERPL CALC-MCNC: 49 MG/DL (ref 5–40)
WBC # BLD AUTO: 6.8 X10E3/UL (ref 3.4–10.8)

## 2023-06-09 NOTE — ASSESSMENT & PLAN NOTE
She is scheduled for her 2nd epidural 6/29/23 which have been helpful in the past. She is requesting medication for pain, currently taking Ibuprofen and Meloxicam. She may begin Meloxicam 7.5 mg but we discussed safety concerns with this medications including renal failure. She will increase fluids and take with food, recheck BMP at f/u appt.

## 2023-06-09 NOTE — ASSESSMENT & PLAN NOTE
She is on Synthroid 112 mcg daily for replacement.   Lab Results   Component Value Date    TSH 9.600 (H) 06/07/2023

## 2023-06-09 NOTE — ASSESSMENT & PLAN NOTE
BP is elevated in the office, currently managed on losartan daily. Monitor BP at home and call with readings in 2-3 weeks.

## 2023-06-09 NOTE — PROGRESS NOTES
"Chief Complaint  Headache and Back Pain    Subjective        Kajal Severino presents to [facility] due to increased low back pain and to f/u on HTN and hypothyroidism.    History of Present Illness  She present with her daughter who helps monitor her care. She has a hx of lumbar spinal stenosis and has received lumbar epidurals for management of pain.  Her last epidural was in March which she states was helpful but pain did not last long.  She is scheduled for repeat epidural June 29.  She is taking ibuprofen and Tylenol for management of pain.  Her blood pressure is elevated in the office, daughter states her blood pressures intermittently been elevated.  She also complains of recurrent headaches which are worse behind the eyes and forehead.  Denies recent falls or head injuries.  No vision changes, no dizziness or nausea/vomiting.    Objective   Vital Signs:  /90 (BP Location: Left arm, Patient Position: Sitting, Cuff Size: Adult)   Pulse 80   Temp 98 °F (36.7 °C) (Temporal)   Ht 157.5 cm (62\")   Wt 67.5 kg (148 lb 12.8 oz)   SpO2 95%   BMI 27.22 kg/m²   Estimated body mass index is 27.22 kg/m² as calculated from the following:    Height as of this encounter: 157.5 cm (62\").    Weight as of this encounter: 67.5 kg (148 lb 12.8 oz).       BMI is >= 25 and <30. (Overweight) The following options were offered after discussion;: nutrition counseling/recommendations      Physical Exam  Constitutional:       Appearance: She is well-developed. She is not ill-appearing.   HENT:      Head: Normocephalic.      Right Ear: Hearing, tympanic membrane and external ear normal.      Left Ear: Hearing, tympanic membrane and external ear normal.      Nose: Nose normal. No nasal deformity, mucosal edema or rhinorrhea.      Right Sinus: No maxillary sinus tenderness or frontal sinus tenderness.      Left Sinus: No maxillary sinus tenderness or frontal sinus tenderness.      Mouth/Throat:      " ASSESSMENT: Patient is a 89y old f with acute cholecystitis    PLAN:    - NPO  - IV abx  - Recommended OR for laparoscopic cholecystectomy for definitive treatment of cholecystitis.  Patient and son (Jose Kay, HCP) are currently refusing surgical intervention, and would prefer non-operative management  - Rec. medical assessment for Optimization for OR and risk stratification  - Surgery will follow if patient and son agrees to cholecystectomy.      Mary Lara, PGY-1   [facility], #[id number] Dentition: Normal dentition.   Eyes:      General: Lids are normal.         Right eye: No discharge.         Left eye: No discharge.      Conjunctiva/sclera: Conjunctivae normal.      Right eye: No exudate.     Left eye: No exudate.  Neck:      Thyroid: No thyroid mass or thyromegaly.      Vascular: No carotid bruit.      Trachea: Trachea normal.   Cardiovascular:      Rate and Rhythm: Regular rhythm.      Pulses: Normal pulses.      Heart sounds: Normal heart sounds. No murmur heard.  Pulmonary:      Effort: No respiratory distress.      Breath sounds: Normal breath sounds. No decreased breath sounds, wheezing, rhonchi or rales.   Abdominal:      General: Bowel sounds are normal.      Palpations: Abdomen is soft.      Tenderness: There is no abdominal tenderness.   Musculoskeletal:      Cervical back: Normal range of motion. No edema.   Lymphadenopathy:      Head:      Right side of head: No submental, submandibular, tonsillar, preauricular, posterior auricular or occipital adenopathy.      Left side of head: No submental, submandibular, tonsillar, preauricular, posterior auricular or occipital adenopathy.   Skin:     General: Skin is warm and dry.      Nails: There is no clubbing.   Neurological:      Mental Status: She is alert.   Psychiatric:         Behavior: Behavior is cooperative.      Result Review :  The following data was reviewed by: HAILY Pisano on 06/07/2023:  Common labs          7/14/2022    11:31 12/29/2022    11:10 6/7/2023    11:56   Common Labs   Glucose 90  92  72    BUN 15  26  15    Creatinine 0.66  0.68  0.72    Sodium 142  139  142    Potassium 4.2  4.4  4.1    Chloride 102  101  102    Calcium 9.5  9.6  9.7    Total Protein 6.8  6.5  6.9    Albumin 4.2  4.1  4.3    Total Bilirubin 0.4  0.5  0.3    Alkaline Phosphatase 73  66  73    AST (SGOT) 22  15  19    ALT (SGPT) 19  17  15    WBC 5.8  6.64  6.8    Hemoglobin 13.4  12.6  13.3    Hematocrit 41.4  39.2  40.3    Platelets 233   215  248    Total Cholesterol  200  202    Triglycerides  165  291    HDL Cholesterol  57  49    LDL Cholesterol   114  104    Hemoglobin A1C 5.9        Data reviewed : Consultant notes pain mgmt 3/2023             Assessment and Plan   Diagnoses and all orders for this visit:    1. Intervertebral disc stenosis of neural canal of lumbar region (Primary)  Assessment & Plan:  She is scheduled for her 2nd epidural 6/29/23 which have been helpful in the past. She is requesting medication for pain, currently taking Ibuprofen and Meloxicam. She may begin Meloxicam 7.5 mg but we discussed safety concerns with this medications including renal failure. She will increase fluids and take with food, recheck BMP at f/u appt.    Orders:  -     meloxicam (Mobic) 7.5 MG tablet; Take 1 tablet by mouth Daily. Take with food.  Dispense: 30 tablet; Refill: 2    2. Nonintractable headache, unspecified chronicity pattern, unspecified headache type  Assessment & Plan:  HAs are behind eyes with increased drainage, worse in the morning and improve throughout the day. She has sinus pressure and drainage, will begin Mucinex and Zyrtec and continue to monitor. Consider further evaluation if sx persist (MRI brain performed in 2019 for similar sx).    Orders:  -     guaiFENesin (Mucinex) 600 MG 12 hr tablet; Take 1 tablet by mouth Every 12 (Twelve) Hours for 7 days.  Dispense: 14 tablet  -     cetirizine (zyrTEC) 10 MG tablet; Take 1 tablet by mouth Daily for 10 days.  Dispense: 10 tablet    3. Acquired hypothyroidism  Assessment & Plan:  She is on Synthroid 112 mcg daily for replacement.   Lab Results   Component Value Date    TSH 9.600 (H) 06/07/2023         4. Primary hypertension  Assessment & Plan:  BP is elevated in the office, currently managed on losartan daily. Monitor BP at home and call with readings in 2-3 weeks.    Orders:  -     CBC & Differential  -     Comprehensive Metabolic Panel  -     Lipid Panel  -     TSH  -     Urinalysis  With Microscopic If Indicated (No Culture) - Urine, Clean Catch    5. Monopolar depression  Assessment & Plan:  Her Lexapro was lowered to 10 mg 7/2022, stable on current medication which we will continue to monitor.      Other orders  -     Specimen Status Report             Follow Up   Return in about 2 months (around 8/7/2023).  Patient was given instructions and counseling regarding her condition or for health maintenance advice. Please see specific information pulled into the AVS if appropriate.

## 2023-06-09 NOTE — ASSESSMENT & PLAN NOTE
HAs are behind eyes with increased drainage, worse in the morning and improve throughout the day. She has sinus pressure and drainage, will begin Mucinex and Zyrtec and continue to monitor. Consider further evaluation if sx persist (MRI brain performed in 2019 for similar sx).

## 2023-06-09 NOTE — ASSESSMENT & PLAN NOTE
Her Lexapro was lowered to 10 mg 7/2022, stable on current medication which we will continue to monitor.

## 2023-06-10 DIAGNOSIS — E03.9 ACQUIRED HYPOTHYROIDISM: ICD-10-CM

## 2023-06-10 RX ORDER — LEVOTHYROXINE SODIUM 0.12 MG/1
125 TABLET ORAL DAILY
Qty: 90 TABLET | Refills: 1 | Status: SHIPPED | OUTPATIENT
Start: 2023-06-10

## 2023-08-08 ENCOUNTER — OFFICE VISIT (OUTPATIENT)
Dept: INTERNAL MEDICINE | Facility: CLINIC | Age: 88
End: 2023-08-08
Payer: MEDICARE

## 2023-08-08 VITALS
BODY MASS INDEX: 26.98 KG/M2 | TEMPERATURE: 96 F | OXYGEN SATURATION: 95 % | HEIGHT: 62 IN | WEIGHT: 146.6 LBS | SYSTOLIC BLOOD PRESSURE: 134 MMHG | DIASTOLIC BLOOD PRESSURE: 78 MMHG | HEART RATE: 81 BPM

## 2023-08-08 DIAGNOSIS — R82.90 ABNORMAL URINE FINDINGS: Primary | ICD-10-CM

## 2023-08-08 DIAGNOSIS — I10 PRIMARY HYPERTENSION: Primary | ICD-10-CM

## 2023-08-08 DIAGNOSIS — J30.2 SEASONAL ALLERGIC RHINITIS, UNSPECIFIED TRIGGER: Chronic | ICD-10-CM

## 2023-08-08 DIAGNOSIS — G89.29 CHRONIC BILATERAL LOW BACK PAIN WITH RIGHT-SIDED SCIATICA: Chronic | ICD-10-CM

## 2023-08-08 DIAGNOSIS — R73.09 ELEVATED GLUCOSE: ICD-10-CM

## 2023-08-08 DIAGNOSIS — E55.9 VITAMIN D DEFICIENCY: Chronic | ICD-10-CM

## 2023-08-08 DIAGNOSIS — E03.9 ACQUIRED HYPOTHYROIDISM: Chronic | ICD-10-CM

## 2023-08-08 DIAGNOSIS — M54.41 CHRONIC BILATERAL LOW BACK PAIN WITH RIGHT-SIDED SCIATICA: Chronic | ICD-10-CM

## 2023-08-08 LAB
BACTERIA UR QL AUTO: ABNORMAL /HPF
BILIRUB UR QL STRIP: NEGATIVE
CLARITY UR: ABNORMAL
COLOR UR: YELLOW
GLUCOSE UR STRIP-MCNC: NEGATIVE MG/DL
HGB UR QL STRIP.AUTO: NEGATIVE
HYALINE CASTS UR QL AUTO: ABNORMAL /LPF
KETONES UR QL STRIP: NEGATIVE
LEUKOCYTE ESTERASE UR QL STRIP.AUTO: ABNORMAL
MUCOUS THREADS URNS QL MICRO: ABNORMAL /HPF
NITRITE UR QL STRIP: POSITIVE
PH UR STRIP.AUTO: 5.5 [PH] (ref 5–8)
PROT UR QL STRIP: NEGATIVE
RBC # UR STRIP: ABNORMAL /HPF
REF LAB TEST METHOD: ABNORMAL
SP GR UR STRIP: 1.01 (ref 1–1.03)
SQUAMOUS #/AREA URNS HPF: ABNORMAL /HPF
UROBILINOGEN UR QL STRIP: ABNORMAL
WBC # UR STRIP: ABNORMAL /HPF

## 2023-08-08 PROCEDURE — 99214 OFFICE O/P EST MOD 30 MIN: CPT | Performed by: NURSE PRACTITIONER

## 2023-08-08 PROCEDURE — 1159F MED LIST DOCD IN RCRD: CPT | Performed by: NURSE PRACTITIONER

## 2023-08-08 PROCEDURE — 1160F RVW MEDS BY RX/DR IN RCRD: CPT | Performed by: NURSE PRACTITIONER

## 2023-08-08 NOTE — PROGRESS NOTES
"Chief Complaint  Hypertension (2 month follow up), Hypothyroidism, Back Pain, and Glucose intolerance    Subjective        Kajal Severino presents to [facility] for f/u regarding HTN, hypothyroidism and chronic back and right leg pain.    History of Present Illness  She presents with her daughter for follow-up, has received 1 lumbar epidural and is scheduled for another 1 in October.  She notes a return of right radicular pain over the past several days.  She does take gabapentin nightly for neuralgia.  She takes meloxicam 7.5 daily along with Tylenol for pain control.  She does note urinary urgency and frequency.  Denies recent falls.    Objective   Vital Signs:  /78   Pulse 81   Temp 96 øF (35.6 øC) (Temporal)   Ht 157.5 cm (62\")   Wt 66.5 kg (146 lb 9.6 oz)   SpO2 95%   BMI 26.81 kg/mý   Estimated body mass index is 26.81 kg/mý as calculated from the following:    Height as of this encounter: 157.5 cm (62\").    Weight as of this encounter: 66.5 kg (146 lb 9.6 oz).          Physical Exam  Constitutional:       Appearance: She is well-developed. She is not ill-appearing.   HENT:      Head: Normocephalic.      Right Ear: Hearing, tympanic membrane and external ear normal.      Left Ear: Hearing, tympanic membrane and external ear normal.      Nose: Nose normal. No nasal deformity, mucosal edema or rhinorrhea.      Right Sinus: No maxillary sinus tenderness or frontal sinus tenderness.      Left Sinus: No maxillary sinus tenderness or frontal sinus tenderness.      Mouth/Throat:      Dentition: Normal dentition.   Eyes:      General: Lids are normal.         Right eye: No discharge.         Left eye: No discharge.      Conjunctiva/sclera: Conjunctivae normal.      Right eye: No exudate.     Left eye: No exudate.  Neck:      Thyroid: No thyroid mass or thyromegaly.      Vascular: No carotid bruit.      Trachea: Trachea normal.   Cardiovascular:      Rate and Rhythm: Regular " rhythm.      Pulses: Normal pulses.      Heart sounds: Normal heart sounds. No murmur heard.  Pulmonary:      Effort: No respiratory distress.      Breath sounds: Normal breath sounds. No decreased breath sounds, wheezing, rhonchi or rales.   Abdominal:      General: Bowel sounds are normal.      Palpations: Abdomen is soft.      Tenderness: There is no abdominal tenderness.   Musculoskeletal:      Cervical back: Normal range of motion. No edema.   Lymphadenopathy:      Head:      Right side of head: No submental, submandibular, tonsillar, preauricular, posterior auricular or occipital adenopathy.      Left side of head: No submental, submandibular, tonsillar, preauricular, posterior auricular or occipital adenopathy.   Skin:     General: Skin is warm and dry.      Nails: There is no clubbing.   Neurological:      Mental Status: She is alert.   Psychiatric:         Behavior: Behavior is cooperative.      Result Review :  The following data was reviewed by: HAILY Pisano on 08/08/2023:  Common labs          12/29/2022    11:10 6/7/2023    11:56   Common Labs   Glucose 92  72    BUN 26  15    Creatinine 0.68  0.72    Sodium 139  142    Potassium 4.4  4.1    Chloride 101  102    Calcium 9.6  9.7    Total Protein 6.5  6.9    Albumin 4.1  4.3    Total Bilirubin 0.5  0.3    Alkaline Phosphatase 66  73    AST (SGOT) 15  19    ALT (SGPT) 17  15    WBC 6.64  6.8    Hemoglobin 12.6  13.3    Hematocrit 39.2  40.3    Platelets 215  248    Total Cholesterol 200  202    Triglycerides 165  291    HDL Cholesterol 57  49    LDL Cholesterol  114  104      Data reviewed : Consultant notes pain mgmt 6/29/23             Assessment and Plan   Diagnoses and all orders for this visit:    1. Primary hypertension (Primary)  Assessment & Plan:  Blood pressure is mildly elevated but improved with repeat, continue losartan along with close blood pressure monitoring.    Orders:  -     CBC (No Diff)  -     Comprehensive Metabolic  Panel  -     Lipid Panel  -     Urinalysis With Microscopic If Indicated (No Culture) - Urine, Clean Catch  -     Urinalysis, Microscopic Only - Urine, Clean Catch    2. Seasonal allergic rhinitis, unspecified trigger  Assessment & Plan:  She is currently managed on Flonase and Zyrtec which she is tolerating well.      3. Acquired hypothyroidism  Assessment & Plan:  Her Synthroid was increased to 125 mcg daily 6/9/23, recheck TSH today    Orders:  -     TSH    4. Vitamin D deficiency  Assessment & Plan:  She is monitored on a daily vitamin D supplement-recheck level.    Orders:  -     Vitamin D,25-Hydroxy    5. Elevated glucose  -     Hemoglobin A1c    6. Chronic bilateral low back pain with right-sided sciatica  Assessment & Plan:  She has had one lumbar epidural is scheduled for second in October, continue gabapentin nightly for pain control.  We will need to monitor her kidney function closely due to meloxicam usage.               Follow Up   No follow-ups on file.  Patient was given instructions and counseling regarding her condition or for health maintenance advice. Please see specific information pulled into the AVS if appropriate.

## 2023-08-08 NOTE — ASSESSMENT & PLAN NOTE
She has had one lumbar epidural is scheduled for second in October, continue gabapentin nightly for pain control.  We will need to monitor her kidney function closely due to meloxicam usage.

## 2023-08-08 NOTE — ASSESSMENT & PLAN NOTE
Blood pressure is mildly elevated but improved with repeat, continue losartan along with close blood pressure monitoring.

## 2023-08-09 LAB
25(OH)D3+25(OH)D2 SERPL-MCNC: 33.8 NG/ML (ref 30–100)
ALBUMIN SERPL-MCNC: 4.3 G/DL (ref 3.7–4.7)
ALBUMIN/GLOB SERPL: 1.8 {RATIO} (ref 1.2–2.2)
ALP SERPL-CCNC: 60 IU/L (ref 44–121)
ALT SERPL-CCNC: 15 IU/L (ref 0–32)
AST SERPL-CCNC: 16 IU/L (ref 0–40)
BILIRUB SERPL-MCNC: 0.2 MG/DL (ref 0–1.2)
BUN SERPL-MCNC: 19 MG/DL (ref 8–27)
BUN/CREAT SERPL: 32 (ref 12–28)
CALCIUM SERPL-MCNC: 9.2 MG/DL (ref 8.7–10.3)
CHLORIDE SERPL-SCNC: 103 MMOL/L (ref 96–106)
CHOLEST SERPL-MCNC: 180 MG/DL (ref 100–199)
CO2 SERPL-SCNC: 25 MMOL/L (ref 20–29)
CREAT SERPL-MCNC: 0.59 MG/DL (ref 0.57–1)
EGFRCR SERPLBLD CKD-EPI 2021: 87 ML/MIN/1.73
ERYTHROCYTE [DISTWIDTH] IN BLOOD BY AUTOMATED COUNT: 12 % (ref 11.7–15.4)
GLOBULIN SER CALC-MCNC: 2.4 G/DL (ref 1.5–4.5)
GLUCOSE SERPL-MCNC: 79 MG/DL (ref 70–99)
HBA1C MFR BLD: 5.8 % (ref 4.8–5.6)
HCT VFR BLD AUTO: 39.4 % (ref 34–46.6)
HDLC SERPL-MCNC: 46 MG/DL
HGB BLD-MCNC: 13.1 G/DL (ref 11.1–15.9)
LDLC SERPL CALC-MCNC: 93 MG/DL (ref 0–99)
MCH RBC QN AUTO: 31.3 PG (ref 26.6–33)
MCHC RBC AUTO-ENTMCNC: 33.2 G/DL (ref 31.5–35.7)
MCV RBC AUTO: 94 FL (ref 79–97)
PLATELET # BLD AUTO: 239 X10E3/UL (ref 150–450)
POTASSIUM SERPL-SCNC: 4.4 MMOL/L (ref 3.5–5.2)
PROT SERPL-MCNC: 6.7 G/DL (ref 6–8.5)
RBC # BLD AUTO: 4.18 X10E6/UL (ref 3.77–5.28)
SODIUM SERPL-SCNC: 143 MMOL/L (ref 134–144)
TRIGL SERPL-MCNC: 243 MG/DL (ref 0–149)
TSH SERPL DL<=0.005 MIU/L-ACNC: 0.14 UIU/ML (ref 0.45–4.5)
VLDLC SERPL CALC-MCNC: 41 MG/DL (ref 5–40)
WBC # BLD AUTO: 5.4 X10E3/UL (ref 3.4–10.8)

## 2023-08-13 DIAGNOSIS — I10 ESSENTIAL HYPERTENSION: ICD-10-CM

## 2023-08-14 LAB
BACTERIA UR CULT: ABNORMAL
BACTERIA UR CULT: ABNORMAL
OTHER ANTIBIOTIC SUSC ISLT: ABNORMAL

## 2023-08-14 RX ORDER — LOSARTAN POTASSIUM 100 MG/1
TABLET ORAL
Qty: 90 TABLET | Refills: 1 | Status: SHIPPED | OUTPATIENT
Start: 2023-08-14

## 2023-08-16 DIAGNOSIS — N39.0 ACUTE UTI: Primary | ICD-10-CM

## 2023-08-16 RX ORDER — CEFDINIR 300 MG/1
300 CAPSULE ORAL 2 TIMES DAILY
Qty: 10 CAPSULE | Refills: 0 | Status: SHIPPED | OUTPATIENT
Start: 2023-08-16 | End: 2023-08-21

## 2023-08-18 DIAGNOSIS — E03.9 ACQUIRED HYPOTHYROIDISM: Primary | ICD-10-CM

## 2023-08-18 RX ORDER — LEVOTHYROXINE SODIUM 0.12 MG/1
TABLET ORAL
Qty: 90 TABLET | Refills: 1
Start: 2023-08-18

## 2023-08-18 RX ORDER — LEVOTHYROXINE SODIUM 112 UG/1
TABLET ORAL
Qty: 30 TABLET | Refills: 5 | Status: SHIPPED | OUTPATIENT
Start: 2023-08-18

## 2023-08-22 DIAGNOSIS — E03.9 ACQUIRED HYPOTHYROIDISM: ICD-10-CM

## 2023-08-22 RX ORDER — LEVOTHYROXINE SODIUM 112 UG/1
TABLET ORAL
Qty: 30 TABLET | Refills: 5 | Status: SHIPPED | OUTPATIENT
Start: 2023-08-22

## 2023-08-22 RX ORDER — LEVOTHYROXINE SODIUM 0.12 MG/1
TABLET ORAL
Qty: 90 TABLET | Refills: 1 | Status: SHIPPED | OUTPATIENT
Start: 2023-08-22

## 2023-08-31 DIAGNOSIS — M99.53 INTERVERTEBRAL DISC STENOSIS OF NEURAL CANAL OF LUMBAR REGION: ICD-10-CM

## 2023-09-01 RX ORDER — MELOXICAM 7.5 MG/1
TABLET ORAL
Qty: 30 TABLET | Refills: 2 | Status: SHIPPED | OUTPATIENT
Start: 2023-09-01

## 2023-09-01 NOTE — TELEPHONE ENCOUNTER
----- Message from Roro Osborn sent at 10/30/2019  1:17 PM EDT -----  Contact: Keena DAN daughter,  Pt BP has been running high, today at another dr visit.  160/98  162/96  Should she restart the losartan.    [phone]    show

## 2023-09-18 DIAGNOSIS — F33.9 MONOPOLAR DEPRESSION: Chronic | ICD-10-CM

## 2023-09-18 RX ORDER — ESCITALOPRAM OXALATE 10 MG/1
TABLET ORAL
Qty: 135 TABLET | Refills: 0 | Status: SHIPPED | OUTPATIENT
Start: 2023-09-18

## 2023-09-30 ENCOUNTER — HOSPITAL ENCOUNTER (OUTPATIENT)
Facility: HOSPITAL | Age: 88
Setting detail: OBSERVATION
Discharge: HOME | End: 2023-10-01
Attending: STUDENT IN AN ORGANIZED HEALTH CARE EDUCATION/TRAINING PROGRAM | Admitting: EMERGENCY MEDICINE
Payer: MEDICARE

## 2023-09-30 ENCOUNTER — APPOINTMENT (OUTPATIENT)
Dept: GENERAL RADIOLOGY | Facility: HOSPITAL | Age: 88
End: 2023-09-30
Payer: MEDICARE

## 2023-09-30 ENCOUNTER — APPOINTMENT (OUTPATIENT)
Dept: CT IMAGING | Facility: HOSPITAL | Age: 88
End: 2023-09-30
Payer: MEDICARE

## 2023-09-30 DIAGNOSIS — R07.9 CHEST PAIN, UNSPECIFIED TYPE: Primary | ICD-10-CM

## 2023-09-30 DIAGNOSIS — K44.9 HIATAL HERNIA: ICD-10-CM

## 2023-09-30 LAB
ALBUMIN SERPL-MCNC: 3.5 G/DL (ref 3.5–5.2)
ALBUMIN/GLOB SERPL: 1.3 G/DL
ALP SERPL-CCNC: 63 U/L (ref 39–117)
ALT SERPL W P-5'-P-CCNC: 13 U/L (ref 1–33)
ANION GAP SERPL CALCULATED.3IONS-SCNC: 9 MMOL/L (ref 5–15)
APTT PPP: 27.4 SECONDS (ref 22.7–35.4)
AST SERPL-CCNC: 17 U/L (ref 1–32)
BASOPHILS # BLD AUTO: 0.04 10*3/MM3 (ref 0–0.2)
BASOPHILS NFR BLD AUTO: 0.6 % (ref 0–1.5)
BILIRUB SERPL-MCNC: <0.2 MG/DL (ref 0–1.2)
BUN SERPL-MCNC: 24 MG/DL (ref 8–23)
BUN/CREAT SERPL: 42.9 (ref 7–25)
CALCIUM SPEC-SCNC: 8.9 MG/DL (ref 8.6–10.5)
CHLORIDE SERPL-SCNC: 106 MMOL/L (ref 98–107)
CO2 SERPL-SCNC: 24 MMOL/L (ref 22–29)
CREAT SERPL-MCNC: 0.56 MG/DL (ref 0.57–1)
DEPRECATED RDW RBC AUTO: 39.7 FL (ref 37–54)
EGFRCR SERPLBLD CKD-EPI 2021: 88.5 ML/MIN/1.73
EOSINOPHIL # BLD AUTO: 0.14 10*3/MM3 (ref 0–0.4)
EOSINOPHIL NFR BLD AUTO: 2.1 % (ref 0.3–6.2)
ERYTHROCYTE [DISTWIDTH] IN BLOOD BY AUTOMATED COUNT: 11.6 % (ref 12.3–15.4)
GEN 5 2HR TROPONIN T REFLEX: 14 NG/L
GLOBULIN UR ELPH-MCNC: 2.6 GM/DL
GLUCOSE SERPL-MCNC: 166 MG/DL (ref 65–99)
HCT VFR BLD AUTO: 36 % (ref 34–46.6)
HGB BLD-MCNC: 11.8 G/DL (ref 12–15.9)
IMM GRANULOCYTES # BLD AUTO: 0.02 10*3/MM3 (ref 0–0.05)
IMM GRANULOCYTES NFR BLD AUTO: 0.3 % (ref 0–0.5)
INR PPP: 0.98 (ref 0.9–1.1)
LIPASE SERPL-CCNC: 33 U/L (ref 13–60)
LYMPHOCYTES # BLD AUTO: 1.47 10*3/MM3 (ref 0.7–3.1)
LYMPHOCYTES NFR BLD AUTO: 21.7 % (ref 19.6–45.3)
MAGNESIUM SERPL-MCNC: 1.7 MG/DL (ref 1.6–2.4)
MCH RBC QN AUTO: 31.1 PG (ref 26.6–33)
MCHC RBC AUTO-ENTMCNC: 32.8 G/DL (ref 31.5–35.7)
MCV RBC AUTO: 94.7 FL (ref 79–97)
MONOCYTES # BLD AUTO: 0.47 10*3/MM3 (ref 0.1–0.9)
MONOCYTES NFR BLD AUTO: 6.9 % (ref 5–12)
NEUTROPHILS NFR BLD AUTO: 4.64 10*3/MM3 (ref 1.7–7)
NEUTROPHILS NFR BLD AUTO: 68.4 % (ref 42.7–76)
NRBC BLD AUTO-RTO: 0 /100 WBC (ref 0–0.2)
PLATELET # BLD AUTO: 224 10*3/MM3 (ref 140–450)
PMV BLD AUTO: 9.7 FL (ref 6–12)
POTASSIUM SERPL-SCNC: 4.2 MMOL/L (ref 3.5–5.2)
PROT SERPL-MCNC: 6.1 G/DL (ref 6–8.5)
PROTHROMBIN TIME: 13.1 SECONDS (ref 11.7–14.2)
RBC # BLD AUTO: 3.8 10*6/MM3 (ref 3.77–5.28)
SODIUM SERPL-SCNC: 139 MMOL/L (ref 136–145)
T4 FREE SERPL-MCNC: 1.14 NG/DL (ref 0.93–1.7)
TROPONIN T DELTA: 6 NG/L
TROPONIN T SERPL HS-MCNC: 8 NG/L
TSH SERPL DL<=0.05 MIU/L-ACNC: 0.21 UIU/ML (ref 0.27–4.2)
WBC NRBC COR # BLD: 6.78 10*3/MM3 (ref 3.4–10.8)

## 2023-09-30 PROCEDURE — G0378 HOSPITAL OBSERVATION PER HR: HCPCS

## 2023-09-30 PROCEDURE — 84439 ASSAY OF FREE THYROXINE: CPT

## 2023-09-30 PROCEDURE — 85025 COMPLETE CBC W/AUTO DIFF WBC: CPT | Performed by: EMERGENCY MEDICINE

## 2023-09-30 PROCEDURE — 71045 X-RAY EXAM CHEST 1 VIEW: CPT

## 2023-09-30 PROCEDURE — 25510000001 IOPAMIDOL PER 1 ML: Performed by: STUDENT IN AN ORGANIZED HEALTH CARE EDUCATION/TRAINING PROGRAM

## 2023-09-30 PROCEDURE — 83735 ASSAY OF MAGNESIUM: CPT | Performed by: EMERGENCY MEDICINE

## 2023-09-30 PROCEDURE — 36415 COLL VENOUS BLD VENIPUNCTURE: CPT

## 2023-09-30 PROCEDURE — 93010 ELECTROCARDIOGRAM REPORT: CPT | Performed by: INTERNAL MEDICINE

## 2023-09-30 PROCEDURE — 99285 EMERGENCY DEPT VISIT HI MDM: CPT

## 2023-09-30 PROCEDURE — 84484 ASSAY OF TROPONIN QUANT: CPT | Performed by: EMERGENCY MEDICINE

## 2023-09-30 PROCEDURE — 71275 CT ANGIOGRAPHY CHEST: CPT

## 2023-09-30 PROCEDURE — 84443 ASSAY THYROID STIM HORMONE: CPT

## 2023-09-30 PROCEDURE — 83690 ASSAY OF LIPASE: CPT | Performed by: EMERGENCY MEDICINE

## 2023-09-30 PROCEDURE — 93005 ELECTROCARDIOGRAM TRACING: CPT | Performed by: EMERGENCY MEDICINE

## 2023-09-30 PROCEDURE — 80053 COMPREHEN METABOLIC PANEL: CPT | Performed by: EMERGENCY MEDICINE

## 2023-09-30 PROCEDURE — 85610 PROTHROMBIN TIME: CPT | Performed by: EMERGENCY MEDICINE

## 2023-09-30 PROCEDURE — 85730 THROMBOPLASTIN TIME PARTIAL: CPT | Performed by: EMERGENCY MEDICINE

## 2023-09-30 PROCEDURE — 93005 ELECTROCARDIOGRAM TRACING: CPT

## 2023-09-30 RX ORDER — AMOXICILLIN 250 MG
2 CAPSULE ORAL 2 TIMES DAILY
Status: DISCONTINUED | OUTPATIENT
Start: 2023-09-30 | End: 2023-10-01 | Stop reason: HOSPADM

## 2023-09-30 RX ORDER — ACETAMINOPHEN 325 MG/1
650 TABLET ORAL EVERY 4 HOURS PRN
Status: DISCONTINUED | OUTPATIENT
Start: 2023-09-30 | End: 2023-10-01 | Stop reason: HOSPADM

## 2023-09-30 RX ORDER — SODIUM CHLORIDE 0.9 % (FLUSH) 0.9 %
10 SYRINGE (ML) INJECTION EVERY 12 HOURS SCHEDULED
Status: DISCONTINUED | OUTPATIENT
Start: 2023-09-30 | End: 2023-10-01 | Stop reason: HOSPADM

## 2023-09-30 RX ORDER — ONDANSETRON 2 MG/ML
4 INJECTION INTRAMUSCULAR; INTRAVENOUS EVERY 6 HOURS PRN
Status: DISCONTINUED | OUTPATIENT
Start: 2023-09-30 | End: 2023-10-01 | Stop reason: HOSPADM

## 2023-09-30 RX ORDER — SODIUM CHLORIDE 9 MG/ML
40 INJECTION, SOLUTION INTRAVENOUS AS NEEDED
Status: DISCONTINUED | OUTPATIENT
Start: 2023-09-30 | End: 2023-10-01 | Stop reason: HOSPADM

## 2023-09-30 RX ORDER — MELOXICAM 7.5 MG/1
7.5 TABLET ORAL DAILY
Status: DISCONTINUED | OUTPATIENT
Start: 2023-10-01 | End: 2023-10-01 | Stop reason: HOSPADM

## 2023-09-30 RX ORDER — GABAPENTIN 300 MG/1
300 CAPSULE ORAL ONCE
Status: COMPLETED | OUTPATIENT
Start: 2023-09-30 | End: 2023-09-30

## 2023-09-30 RX ORDER — BISACODYL 5 MG/1
5 TABLET, DELAYED RELEASE ORAL DAILY PRN
Status: DISCONTINUED | OUTPATIENT
Start: 2023-09-30 | End: 2023-10-01 | Stop reason: HOSPADM

## 2023-09-30 RX ORDER — SODIUM CHLORIDE 0.9 % (FLUSH) 0.9 %
10 SYRINGE (ML) INJECTION AS NEEDED
Status: DISCONTINUED | OUTPATIENT
Start: 2023-09-30 | End: 2023-10-01 | Stop reason: HOSPADM

## 2023-09-30 RX ORDER — FLUTICASONE PROPIONATE 50 MCG
2 SPRAY, SUSPENSION (ML) NASAL DAILY
Status: DISCONTINUED | OUTPATIENT
Start: 2023-10-01 | End: 2023-10-01 | Stop reason: HOSPADM

## 2023-09-30 RX ORDER — ONDANSETRON 4 MG/1
4 TABLET, FILM COATED ORAL EVERY 6 HOURS PRN
Status: DISCONTINUED | OUTPATIENT
Start: 2023-09-30 | End: 2023-10-01 | Stop reason: HOSPADM

## 2023-09-30 RX ORDER — GABAPENTIN 300 MG/1
300 CAPSULE ORAL NIGHTLY
Status: DISCONTINUED | OUTPATIENT
Start: 2023-10-01 | End: 2023-10-01 | Stop reason: HOSPADM

## 2023-09-30 RX ORDER — BISACODYL 10 MG
10 SUPPOSITORY, RECTAL RECTAL DAILY PRN
Status: DISCONTINUED | OUTPATIENT
Start: 2023-09-30 | End: 2023-10-01 | Stop reason: HOSPADM

## 2023-09-30 RX ORDER — LEVOTHYROXINE SODIUM 112 UG/1
112 TABLET ORAL 3 TIMES WEEKLY
Status: DISCONTINUED | OUTPATIENT
Start: 2023-10-02 | End: 2023-10-01 | Stop reason: HOSPADM

## 2023-09-30 RX ORDER — NITROGLYCERIN 0.4 MG/1
0.4 TABLET SUBLINGUAL
Status: DISCONTINUED | OUTPATIENT
Start: 2023-09-30 | End: 2023-10-01 | Stop reason: HOSPADM

## 2023-09-30 RX ORDER — LOSARTAN POTASSIUM 50 MG/1
100 TABLET ORAL ONCE
Status: COMPLETED | OUTPATIENT
Start: 2023-09-30 | End: 2023-09-30

## 2023-09-30 RX ORDER — LEVOTHYROXINE SODIUM 0.05 MG/1
125 TABLET ORAL
Status: DISCONTINUED | OUTPATIENT
Start: 2023-10-01 | End: 2023-10-01 | Stop reason: HOSPADM

## 2023-09-30 RX ORDER — POLYETHYLENE GLYCOL 3350 17 G/17G
17 POWDER, FOR SOLUTION ORAL DAILY PRN
Status: DISCONTINUED | OUTPATIENT
Start: 2023-09-30 | End: 2023-10-01 | Stop reason: HOSPADM

## 2023-09-30 RX ORDER — LOSARTAN POTASSIUM 100 MG/1
100 TABLET ORAL DAILY
Status: DISCONTINUED | OUTPATIENT
Start: 2023-10-01 | End: 2023-10-01

## 2023-09-30 RX ORDER — GABAPENTIN 300 MG/1
300 CAPSULE ORAL NIGHTLY
Status: DISCONTINUED | OUTPATIENT
Start: 2023-09-30 | End: 2023-09-30

## 2023-09-30 RX ADMIN — IOPAMIDOL 95 ML: 755 INJECTION, SOLUTION INTRAVENOUS at 19:38

## 2023-09-30 RX ADMIN — GABAPENTIN 300 MG: 300 CAPSULE ORAL at 20:30

## 2023-09-30 RX ADMIN — Medication 10 ML: at 23:36

## 2023-09-30 RX ADMIN — LOSARTAN POTASSIUM 100 MG: 50 TABLET, FILM COATED ORAL at 20:31

## 2023-09-30 NOTE — ED PROVIDER NOTES
EMERGENCY DEPARTMENT ENCOUNTER    Room Number:    PCP: India Alanis APRN  History obtained from: Patient, daughter      HPI:  Chief Complaint: Chest pain  A complete HPI/ROS/PMH/PSH/SH/FH are unobtainable due to: Not applicable  Context: Kajal Severino is a 88 y.o. female who presents to the ED c/o chest pain.  Central, started around 430 or 5 tonight.  Very severe.  Has never had pain like this in the past.  No other recent illness, fever, chills.  Take medication as prescribed.  Blood pressure was very high, SBP greater than 200 during this event.  Symptoms completely resolved after multiple nitroglycerin.  Patient currently denies any complaints.            PAST MEDICAL HISTORY  Active Ambulatory Problems     Diagnosis Date Noted    Hypertension 2016    History of colonic polyps 2016    Hypothyroidism 2016    Allergic rhinitis     Vitamin D deficiency 2016    Bee sting allergy 2019    Monopolar depression 2019    Breast calcification, left 2019    Chronic bilateral low back pain with right-sided sciatica 2020    Nonintractable headache 2021    Prediabetes 2021    Intervertebral disc stenosis of neural canal of lumbar region 2022     Resolved Ambulatory Problems     Diagnosis Date Noted    Hyperlipidemia 2016    Acute right ankle pain 2018    Polycythemia 2019     Past Medical History:   Diagnosis Date    Back pain, lumbosacral     Depression     Erythrocytosis     Fibrocystic breast     H/O bone density study 2014    Headache     History of anemia     History of chest pain     History of prior pregnancies     Low back pain     Osteoarthritis     Osteoporosis     Peripheral neuropathy     Potassium deficiency     Shortness of breath          PAST SURGICAL HISTORY  Past Surgical History:   Procedure Laterality Date    BREAST BIOPSY Bilateral 1980    x3     SECTION N/A     x1    COLONOSCOPY N/A 10/05/2012    +  Diverticulosis and polyps    EPIDURAL BLOCK      PAP SMEAR N/A 11/15/2006         FAMILY HISTORY  Family History   Problem Relation Age of Onset    Heart disease Mother     Hypertension Mother     Diabetes Father     Heart disease Father     Hypertension Father     Breast cancer Sister     Leukemia Brother         CML    Breast cancer Daughter     Hypertension Daughter     Thyroid disease Daughter     Colon cancer Brother     Prostate cancer Brother     Prostate cancer Brother     Kidney cancer Brother     Hypertension Daughter     Hypertension Son          SOCIAL HISTORY  Social History     Socioeconomic History    Marital status:     Number of children: 4    Years of education: High School   Tobacco Use    Smoking status: Former     Packs/day: 0.25     Years: 15.00     Pack years: 3.75     Types: Cigarettes     Quit date: 1995     Years since quittin.7    Smokeless tobacco: Never   Substance and Sexual Activity    Alcohol use: No    Drug use: No    Sexual activity: Not Currently     Partners: Male         ALLERGIES  Patient has no known allergies.        REVIEW OF SYSTEMS    As per HPI      PHYSICAL EXAM  ED Triage Vitals [23 1829]   Temp Heart Rate Resp BP SpO2   98.1 °F (36.7 °C) 78 18 175/84 95 %      Temp src Heart Rate Source Patient Position BP Location FiO2 (%)   Oral Monitor Sitting Right arm --       Physical Exam  Constitutional:       General: She is not in acute distress.  HENT:      Head: Normocephalic and atraumatic.   Cardiovascular:      Rate and Rhythm: Normal rate and regular rhythm.   Pulmonary:      Effort: Pulmonary effort is normal. No respiratory distress.   Abdominal:      General: There is no distension.      Palpations: Abdomen is soft.      Tenderness: There is no abdominal tenderness.   Musculoskeletal:         General: No swelling or deformity.   Skin:     General: Skin is warm and dry.   Neurological:      Mental Status: She is alert. Mental status is at  baseline.         Vital signs and nursing notes reviewed.          LAB RESULTS  Recent Results (from the past 24 hour(s))   ECG 12 Lead Chest Pain    Collection Time: 09/30/23  6:41 PM   Result Value Ref Range    QT Interval 457 ms    QTC Interval 547 ms   Comprehensive Metabolic Panel    Collection Time: 09/30/23  6:53 PM    Specimen: Blood   Result Value Ref Range    Glucose 166 (H) 65 - 99 mg/dL    BUN 24 (H) 8 - 23 mg/dL    Creatinine 0.56 (L) 0.57 - 1.00 mg/dL    Sodium 139 136 - 145 mmol/L    Potassium 4.2 3.5 - 5.2 mmol/L    Chloride 106 98 - 107 mmol/L    CO2 24.0 22.0 - 29.0 mmol/L    Calcium 8.9 8.6 - 10.5 mg/dL    Total Protein 6.1 6.0 - 8.5 g/dL    Albumin 3.5 3.5 - 5.2 g/dL    ALT (SGPT) 13 1 - 33 U/L    AST (SGOT) 17 1 - 32 U/L    Alkaline Phosphatase 63 39 - 117 U/L    Total Bilirubin <0.2 0.0 - 1.2 mg/dL    Globulin 2.6 gm/dL    A/G Ratio 1.3 g/dL    BUN/Creatinine Ratio 42.9 (H) 7.0 - 25.0    Anion Gap 9.0 5.0 - 15.0 mmol/L    eGFR 88.5 >60.0 mL/min/1.73   Lipase    Collection Time: 09/30/23  6:53 PM    Specimen: Blood   Result Value Ref Range    Lipase 33 13 - 60 U/L   High Sensitivity Troponin T    Collection Time: 09/30/23  6:53 PM    Specimen: Blood   Result Value Ref Range    HS Troponin T 8 <10 ng/L   Magnesium    Collection Time: 09/30/23  6:53 PM    Specimen: Blood   Result Value Ref Range    Magnesium 1.7 1.6 - 2.4 mg/dL   Protime-INR    Collection Time: 09/30/23  6:53 PM    Specimen: Blood   Result Value Ref Range    Protime 13.1 11.7 - 14.2 Seconds    INR 0.98 0.90 - 1.10   aPTT    Collection Time: 09/30/23  6:53 PM    Specimen: Blood   Result Value Ref Range    PTT 27.4 22.7 - 35.4 seconds   CBC Auto Differential    Collection Time: 09/30/23  6:53 PM    Specimen: Blood   Result Value Ref Range    WBC 6.78 3.40 - 10.80 10*3/mm3    RBC 3.80 3.77 - 5.28 10*6/mm3    Hemoglobin 11.8 (L) 12.0 - 15.9 g/dL    Hematocrit 36.0 34.0 - 46.6 %    MCV 94.7 79.0 - 97.0 fL    MCH 31.1 26.6 - 33.0 pg     MCHC 32.8 31.5 - 35.7 g/dL    RDW 11.6 (L) 12.3 - 15.4 %    RDW-SD 39.7 37.0 - 54.0 fl    MPV 9.7 6.0 - 12.0 fL    Platelets 224 140 - 450 10*3/mm3    Neutrophil % 68.4 42.7 - 76.0 %    Lymphocyte % 21.7 19.6 - 45.3 %    Monocyte % 6.9 5.0 - 12.0 %    Eosinophil % 2.1 0.3 - 6.2 %    Basophil % 0.6 0.0 - 1.5 %    Immature Grans % 0.3 0.0 - 0.5 %    Neutrophils, Absolute 4.64 1.70 - 7.00 10*3/mm3    Lymphocytes, Absolute 1.47 0.70 - 3.10 10*3/mm3    Monocytes, Absolute 0.47 0.10 - 0.90 10*3/mm3    Eosinophils, Absolute 0.14 0.00 - 0.40 10*3/mm3    Basophils, Absolute 0.04 0.00 - 0.20 10*3/mm3    Immature Grans, Absolute 0.02 0.00 - 0.05 10*3/mm3    nRBC 0.0 0.0 - 0.2 /100 WBC   High Sensitivity Troponin T 2Hr    Collection Time: 09/30/23  8:56 PM    Specimen: Blood   Result Value Ref Range    HS Troponin T 14 (H) <10 ng/L    Troponin T Delta 6 (C) >=-4 - <+4 ng/L       Ordered the above labs and reviewed the results.        RADIOLOGY  CT Angiogram Chest    Result Date: 9/30/2023  CT ANGIOGRAM OF THE CHEST  HISTORY: High blood pressure. Chest pain.  COMPARISON: April 9, 2019  TECHNIQUE: Axial CT imaging was obtained through the thorax. IV contrast was administered. Three-D reformatted images were obtained.  FINDINGS: No acute pulmonary thromboembolus is seen. The thoracic aorta is normal in caliber. No dissection is identified. There is atherosclerotic involvement of the thoracic aorta. There is minimal coronary artery calcification. Thyroid gland appears somewhat atrophic. There is a large hiatal hernia. Mediastinal lymph nodes do not appear pathologically enlarged. The hernia sac does appear larger than on the exam from April 2019. There is biapical scarring. Additional scarring is noted at both lung bases. There are postsurgical changes noted at the left lung base. No acute infiltrates are seen. Previously described nodule at the left lung base, measuring 7 mm, is unchanged. No additional follow-up is necessary.  Images through the upper abdomen demonstrate additional atherosclerotic involvement of the abdominal aorta. No acute abnormalities are identified within the upper abdomen. No acute osseous abnormalities are seen. There are degenerative changes of the glenohumeral joints bilaterally.       1. No evidence of aortic dissection. 2. Large hiatal hernia, larger than on prior study from 2019.  Radiation dose reduction techniques were utilized, including automated exposure control and exposure modulation based on body size.   This report was finalized on 9/30/2023 8:12 PM by Dr. Ame Sanchez M.D.       Ordered the above noted radiological studies. Reviewed by me in PACS.        HEART Score: 6          MEDICATIONS GIVEN IN ER  Medications   sodium chloride 0.9 % flush 10 mL (has no administration in time range)   sodium chloride 0.9 % flush 10 mL (has no administration in time range)   sodium chloride 0.9 % infusion 40 mL (has no administration in time range)   sennosides-docusate (PERICOLACE) 8.6-50 MG per tablet 2 tablet (has no administration in time range)     And   polyethylene glycol (MIRALAX) packet 17 g (has no administration in time range)     And   bisacodyl (DULCOLAX) EC tablet 5 mg (has no administration in time range)     And   bisacodyl (DULCOLAX) suppository 10 mg (has no administration in time range)   ondansetron (ZOFRAN) tablet 4 mg (has no administration in time range)     Or   ondansetron (ZOFRAN) injection 4 mg (has no administration in time range)   nitroglycerin (NITROSTAT) SL tablet 0.4 mg (has no administration in time range)   acetaminophen (TYLENOL) tablet 650 mg (has no administration in time range)   iopamidol (ISOVUE-370) 76 % injection 100 mL (95 mL Intravenous Given 9/30/23 1938)   gabapentin (NEURONTIN) capsule 300 mg (300 mg Oral Given 9/30/23 2030)   losartan (COZAAR) tablet 100 mg (100 mg Oral Given 9/30/23 2031)               MEDICAL DECISION MAKING, PROGRESS, and CONSULTS    MDM:  Patient presented emergency department with chest pain, severe.  Onset around 4:30 PM.  Currently resolved after nitroglycerin in route.  Patient denies any other recent illness.  Has not had pain like this before.  No history of cardiac disease.  Labs significant for mild elevation in high-sensitivity troponin, no significant change on repeat.  Imaging demonstrating no evidence of acute vascular abnormality.  Does have hiatal hernia which could explain some of her symptoms.  Given severity of pain and multiple risk factors will admit for observation.    All labs have been independently reviewed by me.  All radiology studies have been reviewed by me and I have also reviewed the radiology report.   EKG's independently viewed and interpreted by me.  Discussion below represents my analysis of pertinent findings related to patient's condition, differential diagnosis, treatment plan and final disposition.      Additional sources:  - Discussed/ obtained information from independent historians: Obtained additional history from daughter at bedside reports the patient reported the pain was a 12.    - External (non-ED) record review:     - Chronic or social conditions impacting care: Age impacts patient's presentation today    - Shared decision making: Discussed plan for observation, patient and family agreeable      Orders placed during this visit:  Orders Placed This Encounter   Procedures    CT Angiogram Chest    Comprehensive Metabolic Panel    Lipase    High Sensitivity Troponin T    Magnesium    Protime-INR    aPTT    CBC Auto Differential    High Sensitivity Troponin T 2Hr    CBC (No Diff)    Basic Metabolic Panel    High Sensitivity Troponin T    NPO Diet NPO Type: Sips with Meds    Diet: Cardiac Diets, Diabetic Diets; Healthy Heart (2-3 Na+); Consistent Carbohydrate; Texture: Regular Texture (IDDSI 7); Fluid Consistency: Thin (IDDSI 0)    Intake & Output    Weigh Patient    Oral Care    Place Sequential Compression  Device    Maintain Sequential Compression Device    Telemetry - Maintain IV Access    Telemetry - Place Orders & Notify Provider of Results When Patient Experiences Acute Chest Pain, Dysrhythmia or Respiratory Distress    May Be Off Telemetry for Tests    Vital Signs    Pulse Oximetry, Continuous    Up With Assistance    Code Status and Medical Interventions:    Inpatient Cardiology Consult    ECG 12 Lead Chest Pain    ECG 12 Lead Chest Pain    ECG 12 Lead Other; Unrelieved or New Chest Pain    Adult Transthoracic Echo Complete w/ Color, Spectral and Contrast if necessary per protocol    Insert Peripheral IV    Initiate ED Observation Status    CBC & Differential         Additional orders considered but not ordered:  Considered additional cardiac testing in the emergency department however will defer at this time.        Differential diagnosis includes but is not limited to:    Acute aortic dissection, esophageal spasm, acute coronary syndrome      Independent interpretation of labs, radiology studies, and discussions with consultants:  ED Course as of 09/30/23 2155   Sat Sep 30, 2023   1925 EKG interpreted myself:  1841, sinus rhythm rate of 86, left bundle branch block, no changes meeting Sgarbossa criteria.  Bundle branch block is new from prior tracing. [FS]   1953 CTA chest interpreted myself:  No evidence of acute aortic abnormality or pulmonary embolism, large hiatal hernia with twisting of the mid stomach [FS]   2010 At reevaluation patient is resting comfortably, denies any pain. [FS]      ED Course User Index  [FS] Kane Mcnally MD           DIAGNOSIS  Final diagnoses:   Chest pain, unspecified type   Hiatal hernia         DISPOSITION  Admitted to observation        Latest Documented Vital Signs:  As of 21:55 EDT  BP- 147/70 HR- 65 Temp- 98.1 °F (36.7 °C) (Oral) O2 sat- 96%              --    Please note that portions of this were completed with a voice recognition program.       Note Disclaimer: At  [facility], we believe that sharing information builds trust and better relationships. You are receiving this note because you are receiving care at [facility] or recently visited. It is possible you will see health information before a provider has talked with you about it. This kind of information can be easy to misunderstand. To help you fully understand what it means for your health, we urge you to discuss this note with your provider.             Kane Mcnally MD  09/30/23 6071

## 2023-09-30 NOTE — ED NOTES
Pt to ED via EMS from home for Chest Pain that started around 1700, sudden onset, substernal sharp in nature, radiates to middle of back, 5/10(after 3 nitros) CP accompanied by SOA; HTN on EMS arrival manual 220/124, EMS reports that the patient may have not been taking her losartan for 12 days

## 2023-10-01 ENCOUNTER — APPOINTMENT (OUTPATIENT)
Dept: CARDIOLOGY | Facility: HOSPITAL | Age: 88
End: 2023-10-01
Payer: MEDICARE

## 2023-10-01 ENCOUNTER — READMISSION MANAGEMENT (OUTPATIENT)
Dept: CALL CENTER | Facility: HOSPITAL | Age: 88
End: 2023-10-01
Payer: MEDICARE

## 2023-10-01 ENCOUNTER — APPOINTMENT (OUTPATIENT)
Dept: NUCLEAR MEDICINE | Facility: HOSPITAL | Age: 88
End: 2023-10-01
Payer: MEDICARE

## 2023-10-01 VITALS
HEART RATE: 83 BPM | WEIGHT: 147.71 LBS | HEIGHT: 62 IN | BODY MASS INDEX: 27.18 KG/M2 | TEMPERATURE: 98.8 F | OXYGEN SATURATION: 99 % | DIASTOLIC BLOOD PRESSURE: 87 MMHG | SYSTOLIC BLOOD PRESSURE: 175 MMHG | RESPIRATION RATE: 16 BRPM

## 2023-10-01 LAB
ANION GAP SERPL CALCULATED.3IONS-SCNC: 10.8 MMOL/L (ref 5–15)
AORTIC DIMENSIONLESS INDEX: 0.6 (DI)
ASCENDING AORTA: 2.9 CM
BH CV ECHO MEAS - ACS: 1.06 CM
BH CV ECHO MEAS - AO MAX PG: 12.8 MMHG
BH CV ECHO MEAS - AO MEAN PG: 5.4 MMHG
BH CV ECHO MEAS - AO ROOT DIAM: 2.45 CM
BH CV ECHO MEAS - AO V2 MAX: 178.8 CM/SEC
BH CV ECHO MEAS - AO V2 VTI: 38.9 CM
BH CV ECHO MEAS - AVA(I,D): 1.44 CM2
BH CV ECHO MEAS - EDV(CUBED): 75.9 ML
BH CV ECHO MEAS - EDV(MOD-SP2): 79 ML
BH CV ECHO MEAS - EDV(MOD-SP4): 82 ML
BH CV ECHO MEAS - EF(MOD-BP): 60.6 %
BH CV ECHO MEAS - EF(MOD-SP2): 59.5 %
BH CV ECHO MEAS - EF(MOD-SP4): 61 %
BH CV ECHO MEAS - ESV(CUBED): 22.1 ML
BH CV ECHO MEAS - ESV(MOD-SP2): 32 ML
BH CV ECHO MEAS - ESV(MOD-SP4): 32 ML
BH CV ECHO MEAS - FS: 33.7 %
BH CV ECHO MEAS - IVS/LVPW: 1.06 CM
BH CV ECHO MEAS - IVSD: 1.13 CM
BH CV ECHO MEAS - LAT PEAK E' VEL: 8.8 CM/SEC
BH CV ECHO MEAS - LV MASS(C)D: 158.3 GRAMS
BH CV ECHO MEAS - LV MAX PG: 3.6 MMHG
BH CV ECHO MEAS - LV MEAN PG: 1.64 MMHG
BH CV ECHO MEAS - LV V1 MAX: 94.6 CM/SEC
BH CV ECHO MEAS - LV V1 VTI: 22.8 CM
BH CV ECHO MEAS - LVIDD: 4.2 CM
BH CV ECHO MEAS - LVIDS: 2.8 CM
BH CV ECHO MEAS - LVOT AREA: 2.46 CM2
BH CV ECHO MEAS - LVOT DIAM: 1.77 CM
BH CV ECHO MEAS - LVPWD: 1.07 CM
BH CV ECHO MEAS - MED PEAK E' VEL: 5.4 CM/SEC
BH CV ECHO MEAS - MR MAX PG: 14.8 MMHG
BH CV ECHO MEAS - MR MAX VEL: 192.1 CM/SEC
BH CV ECHO MEAS - MV A DUR: 0.15 SEC
BH CV ECHO MEAS - MV A MAX VEL: 90.8 CM/SEC
BH CV ECHO MEAS - MV DEC SLOPE: 289.7 CM/SEC2
BH CV ECHO MEAS - MV DEC TIME: 0.39 SEC
BH CV ECHO MEAS - MV E MAX VEL: 48.8 CM/SEC
BH CV ECHO MEAS - MV E/A: 0.54
BH CV ECHO MEAS - MV MAX PG: 5.7 MMHG
BH CV ECHO MEAS - MV MEAN PG: 2.34 MMHG
BH CV ECHO MEAS - MV P1/2T: 85.1 MSEC
BH CV ECHO MEAS - MV V2 VTI: 28.7 CM
BH CV ECHO MEAS - MVA(P1/2T): 2.6 CM2
BH CV ECHO MEAS - MVA(VTI): 1.96 CM2
BH CV ECHO MEAS - PA ACC TIME: 0.12 SEC
BH CV ECHO MEAS - PA V2 MAX: 69.2 CM/SEC
BH CV ECHO MEAS - PULM A REVS DUR: 0.13 SEC
BH CV ECHO MEAS - PULM A REVS VEL: 32.6 CM/SEC
BH CV ECHO MEAS - PULM DIAS VEL: 16.7 CM/SEC
BH CV ECHO MEAS - PULM S/D: 1.26
BH CV ECHO MEAS - PULM SYS VEL: 21 CM/SEC
BH CV ECHO MEAS - RAP SYSTOLE: 8 MMHG
BH CV ECHO MEAS - RV MAX PG: 2.22 MMHG
BH CV ECHO MEAS - RV V1 MAX: 74.6 CM/SEC
BH CV ECHO MEAS - RV V1 VTI: 18.2 CM
BH CV ECHO MEAS - RVSP: 33 MMHG
BH CV ECHO MEAS - SV(LVOT): 56.2 ML
BH CV ECHO MEAS - SV(MOD-SP2): 47 ML
BH CV ECHO MEAS - SV(MOD-SP4): 50 ML
BH CV ECHO MEAS - TR MAX PG: 25 MMHG
BH CV ECHO MEAS - TR MAX VEL: 249.8 CM/SEC
BH CV ECHO MEASUREMENTS AVERAGE E/E' RATIO: 6.87
BH CV NUCLEAR PRIOR STUDY: 3
BH CV REST NUCLEAR ISOTOPE DOSE: 10.6 MCI
BH CV STRESS COMMENTS STAGE 1: NORMAL
BH CV STRESS DOSE REGADENOSON STAGE 1: 0.4
BH CV STRESS DURATION MIN STAGE 1: 0
BH CV STRESS DURATION SEC STAGE 1: 10
BH CV STRESS NUCLEAR ISOTOPE DOSE: 35 MCI
BH CV STRESS PROTOCOL 1: NORMAL
BH CV STRESS RECOVERY BP: NORMAL MMHG
BH CV STRESS RECOVERY HR: 108 BPM
BH CV STRESS STAGE 1: 1
BH CV XLRA - RV BASE: 2.7 CM
BH CV XLRA - RV LENGTH: 6.6 CM
BH CV XLRA - RV MID: 3.5 CM
BH CV XLRA - TDI S': 10.8 CM/SEC
BUN SERPL-MCNC: 17 MG/DL (ref 8–23)
BUN/CREAT SERPL: 35.4 (ref 7–25)
CALCIUM SPEC-SCNC: 9.2 MG/DL (ref 8.6–10.5)
CHLORIDE SERPL-SCNC: 106 MMOL/L (ref 98–107)
CO2 SERPL-SCNC: 24.2 MMOL/L (ref 22–29)
CREAT SERPL-MCNC: 0.48 MG/DL (ref 0.57–1)
DEPRECATED RDW RBC AUTO: 39.5 FL (ref 37–54)
EGFRCR SERPLBLD CKD-EPI 2021: 91.8 ML/MIN/1.73
ERYTHROCYTE [DISTWIDTH] IN BLOOD BY AUTOMATED COUNT: 11.6 % (ref 12.3–15.4)
GLUCOSE SERPL-MCNC: 85 MG/DL (ref 65–99)
HCT VFR BLD AUTO: 38.4 % (ref 34–46.6)
HGB BLD-MCNC: 12.4 G/DL (ref 12–15.9)
LEFT ATRIUM VOLUME INDEX: 32.1 ML/M2
LV EF NUC BP: 84 %
MAXIMAL PREDICTED HEART RATE: 133 BPM
MCH RBC QN AUTO: 30.6 PG (ref 26.6–33)
MCHC RBC AUTO-ENTMCNC: 32.3 G/DL (ref 31.5–35.7)
MCV RBC AUTO: 94.8 FL (ref 79–97)
PERCENT MAX PREDICTED HR: 87.97 %
PLATELET # BLD AUTO: 217 10*3/MM3 (ref 140–450)
PMV BLD AUTO: 9.8 FL (ref 6–12)
POTASSIUM SERPL-SCNC: 3.6 MMOL/L (ref 3.5–5.2)
QT INTERVAL: 450 MS
QT INTERVAL: 457 MS
QT INTERVAL: 498 MS
QTC INTERVAL: 503 MS
QTC INTERVAL: 546 MS
QTC INTERVAL: 547 MS
RBC # BLD AUTO: 4.05 10*6/MM3 (ref 3.77–5.28)
SINUS: 2.7 CM
SODIUM SERPL-SCNC: 141 MMOL/L (ref 136–145)
STJ: 1.97 CM
STRESS BASELINE BP: NORMAL MMHG
STRESS BASELINE HR: 73 BPM
STRESS PERCENT HR: 103 %
STRESS POST PEAK BP: NORMAL MMHG
STRESS POST PEAK HR: 117 BPM
STRESS TARGET HR: 113 BPM
TROPONIN T SERPL HS-MCNC: 10 NG/L
WBC NRBC COR # BLD: 7.27 10*3/MM3 (ref 3.4–10.8)

## 2023-10-01 PROCEDURE — 80048 BASIC METABOLIC PNL TOTAL CA: CPT

## 2023-10-01 PROCEDURE — 93005 ELECTROCARDIOGRAM TRACING: CPT

## 2023-10-01 PROCEDURE — 93018 CV STRESS TEST I&R ONLY: CPT | Performed by: INTERNAL MEDICINE

## 2023-10-01 PROCEDURE — G0378 HOSPITAL OBSERVATION PER HR: HCPCS

## 2023-10-01 PROCEDURE — 93017 CV STRESS TEST TRACING ONLY: CPT

## 2023-10-01 PROCEDURE — 25010000002 REGADENOSON 0.4 MG/5ML SOLUTION: Performed by: EMERGENCY MEDICINE

## 2023-10-01 PROCEDURE — 85027 COMPLETE CBC AUTOMATED: CPT

## 2023-10-01 PROCEDURE — 99204 OFFICE O/P NEW MOD 45 MIN: CPT | Performed by: STUDENT IN AN ORGANIZED HEALTH CARE EDUCATION/TRAINING PROGRAM

## 2023-10-01 PROCEDURE — 78452 HT MUSCLE IMAGE SPECT MULT: CPT | Performed by: INTERNAL MEDICINE

## 2023-10-01 PROCEDURE — 25510000001 PERFLUTREN (DEFINITY) 8.476 MG IN SODIUM CHLORIDE (PF) 0.9 % 10 ML INJECTION

## 2023-10-01 PROCEDURE — 93016 CV STRESS TEST SUPVJ ONLY: CPT | Performed by: INTERNAL MEDICINE

## 2023-10-01 PROCEDURE — A9500 TC99M SESTAMIBI: HCPCS | Performed by: EMERGENCY MEDICINE

## 2023-10-01 PROCEDURE — 78452 HT MUSCLE IMAGE SPECT MULT: CPT

## 2023-10-01 PROCEDURE — 93010 ELECTROCARDIOGRAM REPORT: CPT | Performed by: INTERNAL MEDICINE

## 2023-10-01 PROCEDURE — 84484 ASSAY OF TROPONIN QUANT: CPT

## 2023-10-01 PROCEDURE — 93306 TTE W/DOPPLER COMPLETE: CPT | Performed by: INTERNAL MEDICINE

## 2023-10-01 PROCEDURE — 0 TECHNETIUM SESTAMIBI: Performed by: EMERGENCY MEDICINE

## 2023-10-01 PROCEDURE — 93306 TTE W/DOPPLER COMPLETE: CPT

## 2023-10-01 RX ORDER — REGADENOSON 0.08 MG/ML
0.4 INJECTION, SOLUTION INTRAVENOUS
Status: COMPLETED | OUTPATIENT
Start: 2023-10-01 | End: 2023-10-01

## 2023-10-01 RX ORDER — LOSARTAN POTASSIUM 100 MG/1
100 TABLET ORAL ONCE
Status: COMPLETED | OUTPATIENT
Start: 2023-10-01 | End: 2023-10-01

## 2023-10-01 RX ADMIN — TECHNETIUM TC 99M SESTAMIBI 1 DOSE: 1 INJECTION INTRAVENOUS at 13:21

## 2023-10-01 RX ADMIN — REGADENOSON 0.4 MG: 0.08 INJECTION, SOLUTION INTRAVENOUS at 13:21

## 2023-10-01 RX ADMIN — MELOXICAM 7.5 MG: 7.5 TABLET ORAL at 08:15

## 2023-10-01 RX ADMIN — PERFLUTREN 3 ML: 6.52 INJECTION, SUSPENSION INTRAVENOUS at 09:13

## 2023-10-01 RX ADMIN — ACETAMINOPHEN 650 MG: 325 TABLET, FILM COATED ORAL at 13:59

## 2023-10-01 RX ADMIN — LEVOTHYROXINE SODIUM 125 MCG: 50 TABLET ORAL at 06:00

## 2023-10-01 RX ADMIN — LOSARTAN POTASSIUM 100 MG: 100 TABLET, FILM COATED ORAL at 14:20

## 2023-10-01 RX ADMIN — Medication 10 ML: at 10:00

## 2023-10-01 RX ADMIN — TECHNETIUM TC 99M SESTAMIBI 1 DOSE: 1 INJECTION INTRAVENOUS at 10:32

## 2023-10-01 NOTE — ED NOTES
Nursing report ED to floor  Kajal Severino  88 y.o.  female    HPI :   Chief Complaint   Patient presents with    Chest Pain       Admitting doctor:   Danny Sanders MD    Admitting diagnosis:   The primary encounter diagnosis was Chest pain, unspecified type. A diagnosis of Hiatal hernia was also pertinent to this visit.    Code status:   Current Code Status       Date Active Code Status Order ID Comments User Context       9/30/2023 2150 CPR (Attempt to Resuscitate) [id number]  Rocío Warren APRN ED        Question Answer    Code Status (Patient has no pulse and is not breathing) CPR (Attempt to Resuscitate)    Medical Interventions (Patient has pulse or is breathing) Full Support                    Allergies:   Patient has no known allergies.    Isolation:   No active isolations    Intake and Output  No intake or output data in the 24 hours ending 09/30/23 2151    Weight:       09/30/23 1829   Weight: 67.6 kg (149 lb)       Most recent vitals:   Vitals:    09/30/23 1957 09/30/23 2021 09/30/23 2051 09/30/23 2056   BP: 179/75 159/73 147/70 147/70   BP Location: Right arm   Right arm   Patient Position: Lying   Lying   Pulse: 66 62 66 65   Resp: 18   16   Temp:       TempSrc:       SpO2: 96% 98% 97% 96%   Weight:       Height:           Active LDAs/IV Access:   Lines, Drains & Airways       Active LDAs       Name Placement date Placement time Site Days    Peripheral IV 09/30/23 1832 Anterior;Left;Proximal Forearm 09/30/23 1832  Forearm  less than 1                    Labs (abnormal labs have a star):   Labs Reviewed   COMPREHENSIVE METABOLIC PANEL - Abnormal; Notable for the following components:       Result Value    Glucose 166 (*)     BUN 24 (*)     Creatinine 0.56 (*)     BUN/Creatinine Ratio 42.9 (*)     All other components within normal limits    Narrative:     GFR Normal >60  Chronic Kidney Disease <60  Kidney Failure <15    The GFR formula is only valid for adults with stable renal function between ages  18 and 70.   CBC WITH AUTO DIFFERENTIAL - Abnormal; Notable for the following components:    Hemoglobin 11.8 (*)     RDW 11.6 (*)     All other components within normal limits   HIGH SENSITIVITIY TROPONIN T 2HR - Abnormal; Notable for the following components:    HS Troponin T 14 (*)     Troponin T Delta 6 (*)     All other components within normal limits    Narrative:     High Sensitive Troponin T Reference Range:  <10.0 ng/L- Negative Female for AMI  <15.0 ng/L- Negative Male for AMI  >=10 - Abnormal Female indicating possible myocardial injury.  >=15 - Abnormal Male indicating possible myocardial injury.   Clinicians would have to utilize clinical acumen, EKG, Troponin, and serial changes to determine if it is an Acute Myocardial Infarction or myocardial injury due to an underlying chronic condition.        LIPASE - Normal   TROPONIN - Normal    Narrative:     High Sensitive Troponin T Reference Range:  <10.0 ng/L- Negative Female for AMI  <15.0 ng/L- Negative Male for AMI  >=10 - Abnormal Female indicating possible myocardial injury.  >=15 - Abnormal Male indicating possible myocardial injury.   Clinicians would have to utilize clinical acumen, EKG, Troponin, and serial changes to determine if it is an Acute Myocardial Infarction or myocardial injury due to an underlying chronic condition.        MAGNESIUM - Normal   PROTIME-INR - Normal   APTT - Normal   CBC AND DIFFERENTIAL    Narrative:     The following orders were created for panel order CBC & Differential.  Procedure                               Abnormality         Status                     ---------                               -----------         ------                     CBC Auto Differential[[id number]]        Abnormal            Final result                 Please view results for these tests on the individual orders.       EKG:   ECG 12 Lead Chest Pain   Preliminary Result   HEART RATE= 86  bpm   RR Interval= 698  ms   MD Interval= 192  ms   P  Horizontal Axis= 14  deg   P Front Axis= 36  deg   QRSD Interval= 150  ms   QT Interval= 457  ms   QTcB= 547  ms   QRS Axis= -45  deg   T Wave Axis= 109  deg   - ABNORMAL ECG -   Sinus rhythm   Probable left atrial enlargement   Left bundle branch block   Baseline wander in lead(s) III,aVL   Electronically Signed By:    Date and Time of Study: 2023-09-30 18:41:03          Meds given in ED:   Medications   sodium chloride 0.9 % flush 10 mL (has no administration in time range)   sodium chloride 0.9 % flush 10 mL (has no administration in time range)   sodium chloride 0.9 % infusion 40 mL (has no administration in time range)   sennosides-docusate (PERICOLACE) 8.6-50 MG per tablet 2 tablet (has no administration in time range)     And   polyethylene glycol (MIRALAX) packet 17 g (has no administration in time range)     And   bisacodyl (DULCOLAX) EC tablet 5 mg (has no administration in time range)     And   bisacodyl (DULCOLAX) suppository 10 mg (has no administration in time range)   ondansetron (ZOFRAN) tablet 4 mg (has no administration in time range)     Or   ondansetron (ZOFRAN) injection 4 mg (has no administration in time range)   nitroglycerin (NITROSTAT) SL tablet 0.4 mg (has no administration in time range)   acetaminophen (TYLENOL) tablet 650 mg (has no administration in time range)   iopamidol (ISOVUE-370) 76 % injection 100 mL (95 mL Intravenous Given 9/30/23 1938)   gabapentin (NEURONTIN) capsule 300 mg (300 mg Oral Given 9/30/23 2030)   losartan (COZAAR) tablet 100 mg (100 mg Oral Given 9/30/23 2031)       Imaging results:  CT Angiogram Chest    Result Date: 9/30/2023   1. No evidence of aortic dissection. 2. Large hiatal hernia, larger than on prior study from 2019.  Radiation dose reduction techniques were utilized, including automated exposure control and exposure modulation based on body size.   This report was finalized on 9/30/2023 8:12 PM by Dr. Ame Sanchez M.D.       Ambulatory status:   -  standby    Social issues:   Social History     Socioeconomic History    Marital status:     Number of children: 4    Years of education: High School   Tobacco Use    Smoking status: Former     Packs/day: 0.25     Years: 15.00     Pack years: 3.75     Types: Cigarettes     Quit date: 1995     Years since quittin.7    Smokeless tobacco: Never   Substance and Sexual Activity    Alcohol use: No    Drug use: No    Sexual activity: Not Currently     Partners: Male       NIH Stroke Scale:       Conrad Mustafa RN  23 21:51 EDT

## 2023-10-01 NOTE — PLAN OF CARE
Goal Outcome Evaluation:              Outcome Evaluation: patient left observation unit at this time via private vehicle with all her [facility] discharge summary provided and education included,awre to follow up with primary care as indicated and aware of GI referral, IV removed and patient had no other questions at the time of discharge

## 2023-10-01 NOTE — H&P
[facility]   HISTORY AND PHYSICAL    Patient Name: Kajal Severino  : 1935  MRN: [id number]  Primary Care Physician:  India Alanis APRN  Date of admission: 2023    Subjective   Subjective     Chief Complaint:   Chief Complaint   Patient presents with    Chest Pain         HPI:    Kajal Severino is a 88 y.o. female, with a past medical history including, but not limited to, depression, hyperlipidemia, hypertension, hypothyroidism, and osteoporosis, presented to the emergency department with a complaint of chest pain.  She states the pain started around 5 PM and was the worst pain she has ever felt.  She states it was a nonradiating pressure but also a stabbing sensation.  She got short of breath and was nauseated with the pain.  Her pain was relieved once EMS gave her nitroglycerin.  She denies ever having any thing like this previously.  She is pain-free at this time.  In the emergency department high-sensitivity troponins were 8, 14, delta of 6.  CTA chest shows no evidence of aortic dissection, large hiatal hernia.  EKG sinus rhythm, nonischemic.  Cardiology has been consulted to see the patient in the a.m.  She will remain n.p.o. after midnight.    Review of Systems     All systems were reviewed and were negative except for what was mentioned above in the HPI  Personal History     Past Medical History:   Diagnosis Date    Allergic rhinitis     Back pain, lumbosacral     Depression     Erythrocytosis     Fibrocystic breast     H/O bone density study 2014    Headache     History of anemia     History of chest pain     History of colonic polyps     History of prior pregnancies     x7    Hyperlipidemia     Hypertension     Hypothyroidism     Intervertebral disc stenosis of neural canal of lumbar region 2022    Low back pain     Osteoarthritis     Osteoporosis     Peripheral neuropathy     Potassium deficiency     Shortness of breath        Past Surgical History:   Procedure Laterality  Date    BREAST BIOPSY Bilateral 1980    x3     SECTION N/A     x1    COLONOSCOPY N/A 10/05/2012    + Diverticulosis and polyps    EPIDURAL BLOCK      PAP SMEAR N/A 11/15/2006       Family History: family history includes Breast cancer in her daughter and sister; Colon cancer in her brother; Diabetes in her father; Heart disease in her father and mother; Hypertension in her daughter, daughter, father, mother, and son; Kidney cancer in her brother; Leukemia in her brother; Prostate cancer in her brother and brother; Thyroid disease in her daughter. Otherwise pertinent FHx was reviewed and not pertinent to current issue.    Social History:  reports that she quit smoking about 28 years ago. Her smoking use included cigarettes. She has a 3.75 pack-year smoking history. She has never used smokeless tobacco. She reports that she does not drink alcohol and does not use drugs.    Home Medications:  acetaminophen, cetirizine, escitalopram, fluticasone, gabapentin, levothyroxine, losartan, and meloxicam    Allergies:  No Known Allergies    Objective   Objective     Vitals:   Temp:  [98.1 °F (36.7 °C)] 98.1 °F (36.7 °C)  Heart Rate:  [62-78] 70  Resp:  [16-18] 18  BP: (147-179)/(70-84) 167/79  Physical Exam    Constitutional: Awake, alert   Eyes: PERRLA, sclerae anicteric, no conjunctival injection   HENT: NCAT, mucous membranes moist   Neck: Supple, no thyromegaly, no lymphadenopathy, trachea midline   Respiratory: Clear to auscultation bilaterally, nonlabored respirations    Cardiovascular: RRR, no murmurs, rubs, or gallops, palpable pedal pulses bilaterally   Gastrointestinal: Positive bowel sounds, soft, nontender, nondistended   Musculoskeletal: No bilateral ankle edema, no clubbing or cyanosis to extremities   Psychiatric: Appropriate affect, cooperative   Neurologic: Oriented x 3, strength symmetric in all extremities, Cranial Nerves grossly intact to confrontation, speech clear   Skin: No rashes     Result  Review    Result Review:  I have personally reviewed the results from the time of this admission to 9/30/2023 22:37 EDT and agree with these findings:  [x]  Laboratory list / accordion  []  Microbiology  [x]  Radiology  [x]  EKG/Telemetry   []  Cardiology/Vascular   []  Pathology  [x]  Old records  []  Other:    Assessment & Plan   Assessment / Plan     Brief Patient Summary:  Kajal Severino is a 88 y.o. female who was admitted to the observation unit for further evaluation and treatment of her chest pain.    Active Hospital Problems:  Active Hospital Problems    Diagnosis     **Chest pain      Plan:   Chest pain   -Cardiac monitoring  -Vital signs every 4 hours   -Cardiology consult   -High-sensitivity troponin 8, 14, delta 6  -EKG-sinus rhythm with first-degree AV block  -N.p.o. after midnight   -Echocardiogram in a.m.    Hypothyroidism  -Continue Synthroid  -TSH-0.208, free T4 1.14    Hypertension  -Monitor blood pressure  -Continue losartan      DVT prophylaxis:  Mechanical DVT prophylaxis orders are present.    CODE STATUS:    Code Status (Patient has no pulse and is not breathing): CPR (Attempt to Resuscitate)  Medical Interventions (Patient has pulse or is breathing): Full Support    Admission Status:  I believe this patient meets observation status.    76 minutes have been spent by [facility] providers in the care of this patient while under observation status.      Appropriate PPE worn during patient encounter.  Hand hygeine performed before and after seeing the patient.      Electronically signed by HAILY iCsneros, 09/30/23, 10:37 PM EDT.

## 2023-10-01 NOTE — CONSULTS
[facility]        Patient Name: Kajal Severino  Age/Sex: 88 y.o. female  : 1935  MRN: [id number]    Date of Admission: 2023  Date of Encounter Visit: 10/01/23  Encounter Provider: Billy Hess MD  Referring Provider: No ref. provider found  Place of Service: [facility] CARDIOLOGY  Patient Care Team:  India Alanis APRN as PCP - General (Internal Medicine)  Danny Borjas MD as Consulting Physician (Orthopedic Surgery)  Radha Scruggs MD as Consulting Physician (Hematology and Oncology)  Jemima Calabrese MD as Consulting Physician (Neurology)    Subjective:     Chief Complaint: Chest Pain    Reason for consult: Chest Pain     History of Present Illness:  Kajal Severino is a 88 y.o. female who we are seeing for chest pain. She has a past medical history significant for depression, hyperlipidemia, hypertension, hypothyroidism, and osteoporosis.    She presented to the ED on 23 with complaints of chest pain that started about an hour PTA. She described it as nonradiating pressure with a stabbing sensation. She was also nauseous and short of breath with the pain. She received nitro by EMS and her pain was relieved.  She is never had a pain like that before.  She reports that she has a history of reflux disease but this felt different.  This was a stabbing, pressure-like squeezing sensation in the center of her chest that spread to her back.  It was responsive to nitro in route.    As part of her testing, she underwent a gated CTA chest which revealed no evidence of dissection.  A large hiatal hernia that seems to be enlarged when compared to a CT scan in 2019.  In that CT scan, per below, there was no evidence of any epicardial coronary disease, which is surprising given her age.  However, there was some motion artifact that did limit coronary assessment of the RCA, as well as mid LAD.  On presentation, she had an EKG which revealed a  new left bundle branch block, which resolved on subsequent EKGs.  Her troponin had jumped on presentation.  Due to this troponin jump in the setting of chest pain, cardiology was consulted for further recommendations.    ED review: HS troponin 8 (reflex 14), BUN 24, creatinine 0.56, TSH 0.208,   EKG sinus rhythm rate 72    Prior Cardiac Testing:        Past Medical History:  Past Medical History:   Diagnosis Date    Allergic rhinitis     Back pain, lumbosacral     Depression     Erythrocytosis     Fibrocystic breast     H/O bone density study 2014    Headache     History of anemia     History of chest pain     History of colonic polyps     History of prior pregnancies     x7    Hyperlipidemia     Hypertension     Hypothyroidism     Intervertebral disc stenosis of neural canal of lumbar region 2022    Low back pain     Osteoarthritis     Osteoporosis     Peripheral neuropathy     Potassium deficiency     Shortness of breath        Past Surgical History:   Procedure Laterality Date    BREAST BIOPSY Bilateral 1980    x3     SECTION N/A     x1    COLONOSCOPY N/A 10/05/2012    + Diverticulosis and polyps    EPIDURAL BLOCK      PAP SMEAR N/A 11/15/2006       Home Medications:   Medications Prior to Admission   Medication Sig Dispense Refill Last Dose    acetaminophen (TYLENOL) 650 MG 8 hr tablet Take 1 tablet by mouth Every 8 (Eight) Hours As Needed for Mild Pain.   2023 at 1000    escitalopram (LEXAPRO) 10 MG tablet TAKE 1 AND 1/2 TABLETS BY MOUTH DAILY 135 tablet 0 2023 at 1330    fluticasone (FLONASE) 50 MCG/ACT nasal spray SHAKE LIQUID AND USE 2 SPRAYS IN EACH NOSTRIL DAILY 48 g 1 2023 at 1000    gabapentin (NEURONTIN) 300 MG capsule TAKE 1 CAPSULE BY MOUTH EVERY NIGHT 90 capsule 1 2023 at 2200    levothyroxine (Synthroid) 112 MCG tablet Take 1 tablet Friday- 30 tablet 5 2023 at 1000    levothyroxine (SYNTHROID, LEVOTHROID) 125 MCG tablet 1 tablet Mon-Thur 90 tablet 1  Past Week    losartan (COZAAR) 100 MG tablet TAKE 1 TABLET BY MOUTH DAILY 90 tablet 1 2023 at 2200    meloxicam (MOBIC) 7.5 MG tablet TAKE 1 TABLET BY MOUTH DAILY WITH FOOD 30 tablet 2 2023 at 1400    cetirizine (zyrTEC) 10 MG tablet Take 1 tablet by mouth Daily for 10 days. 10 tablet         Allergies:  No Known Allergies    Past Social History:  Social History     Socioeconomic History    Marital status:     Number of children: 4    Years of education: High School   Tobacco Use    Smoking status: Former     Packs/day: 0.25     Years: 15.00     Pack years: 3.75     Types: Cigarettes     Quit date: 1995     Years since quittin.7    Smokeless tobacco: Never   Vaping Use    Vaping Use: Never used   Substance and Sexual Activity    Alcohol use: No    Drug use: No    Sexual activity: Not Currently     Partners: Male       Past Family History:  Family History   Problem Relation Age of Onset    Heart disease Mother     Hypertension Mother     Diabetes Father     Heart disease Father     Hypertension Father     Breast cancer Sister     Leukemia Brother         CML    Breast cancer Daughter     Hypertension Daughter     Thyroid disease Daughter     Colon cancer Brother     Prostate cancer Brother     Prostate cancer Brother     Kidney cancer Brother     Hypertension Daughter     Hypertension Son        REVIEW OF SYSTEMS:   14 point ROS was performed and is negative except as outlined in HPI          Objective:   Temp:  [97.7 °F (36.5 °C)-98.1 °F (36.7 °C)] 97.7 °F (36.5 °C)  Heart Rate:  [62-78] 63  Resp:  [16-18] 18  BP: (147-179)/(70-88) 176/88   No intake or output data in the 24 hours ending 10/01/23 1405  Body mass index is 27.18 kg/m².      23  1829 23  2228 10/01/23  0912   Weight: 67.6 kg (149 lb) 67.6 kg (149 lb) 67 kg (147 lb 11.3 oz)     Weight change:     General Appearance:    Alert, cooperative, in no acute distress, appears younger than stated age   Head:     Normocephalic, without obvious abnormality, atraumatic   Eyes:            Lids and lashes normal, conjunctivae and sclerae normal, no   icterus, no pallor, corneas clear, PERRLA   Ears:    Ears appear intact with no abnormalities noted   Throat:   No oral lesions, no thrush, oral mucosa moist   Neck:   No adenopathy, supple, trachea midline, no thyromegaly, no   carotid bruit, no JVD   Back:     No kyphosis present, no scoliosis present, no skin lesions, erythema or scars, no tenderness to percussion or palpation, range of motion normal   Lungs:     Clear to auscultation,respirations regular, even and unlabored    Heart:    Regular rhythm and normal rate, normal S1 and S2, no murmur, no gallop, no rub, no click   Chest Wall:    No abnormalities observed   Abdomen:     Normal bowel sounds, no masses, no organomegaly, soft, nontender, nondistended, no guarding, no rebound  tenderness   Extremities:   Moves all extremities well, no edema, no cyanosis, no redness   Pulses:   Pulses palpable and equal bilaterally. Normal radial, carotid, femoral, dorsalis pedis and posterior tibial pulses bilaterally. Normal abdominal aorta   Skin:  Psychiatric:   No bleeding, bruising or rash    Alert and oriented x 3, normal mood and affect     Lab Review:   Results from last 7 days   Lab Units 10/01/23  0335 09/30/23  1853   SODIUM mmol/L 141 139   POTASSIUM mmol/L 3.6 4.2   CHLORIDE mmol/L 106 106   CO2 mmol/L 24.2 24.0   BUN mg/dL 17 24*   CREATININE mg/dL 0.48* 0.56*   GLUCOSE mg/dL 85 166*   CALCIUM mg/dL 9.2 8.9   AST (SGOT) U/L  --  17   ALT (SGPT) U/L  --  13     Results from last 7 days   Lab Units 10/01/23  0335 09/30/23  2056 09/30/23  1853   HSTROP T ng/L 10* 14* 8     Results from last 7 days   Lab Units 10/01/23  0335 09/30/23  1853   WBC 10*3/mm3 7.27 6.78   HEMOGLOBIN g/dL 12.4 11.8*   HEMATOCRIT % 38.4 36.0   PLATELETS 10*3/mm3 217 224     Results from last 7 days   Lab Units 09/30/23  1853   INR  0.98   APTT seconds  27.4     Results from last 7 days   Lab Units 09/30/23  1853   MAGNESIUM mg/dL 1.7           Invalid input(s): LDLCALC      Results from last 7 days   Lab Units 09/30/23  2056   TSH uIU/mL 0.208*       Echo EF Estimated  No results found for: ECHOEFEST    EKG:   I personally viewed and interpreted the patient's EKG    10/1/23      9/30/23        Imaging:  Imaging Results (Most Recent)       Procedure Component Value Units Date/Time    XR Chest 1 View [[id number]] Collected: 09/30/23 2319     Updated: 09/30/23 2323    Narrative:      SINGLE VIEW OF THE CHEST     HISTORY: Chest pain     COMPARISON: September 30, 2023     FINDINGS:  There is cardiomegaly. There is calcification of the aorta. No  pneumothorax, pleural effusion, or acute infiltrate is seen. Hiatal  hernia is again noted. There are advanced degenerative changes of the  shoulders bilaterally.       Impression:      No significant interval change.     This report was finalized on 9/30/2023 11:20 PM by Dr. Ame Sanchez M.D.       CT Angiogram Chest [[id number]] Collected: 09/30/23 1958     Updated: 09/30/23 2016    Narrative:      CT ANGIOGRAM OF THE CHEST     HISTORY: High blood pressure. Chest pain.     COMPARISON: April 9, 2019     TECHNIQUE: Axial CT imaging was obtained through the thorax. IV contrast  was administered. Three-D reformatted images were obtained.     FINDINGS:  No acute pulmonary thromboembolus is seen. The thoracic aorta is normal  in caliber. No dissection is identified. There is atherosclerotic  involvement of the thoracic aorta. There is minimal coronary artery  calcification. Thyroid gland appears somewhat atrophic. There is a large  hiatal hernia. Mediastinal lymph nodes do not appear pathologically  enlarged. The hernia sac does appear larger than on the exam from April 2019. There is biapical scarring. Additional scarring is noted at both  lung bases. There are postsurgical changes noted at the left lung base.  No acute  infiltrates are seen. Previously described nodule at the left  lung base, measuring 7 mm, is unchanged. No additional follow-up is  necessary. Images through the upper abdomen demonstrate additional  atherosclerotic involvement of the abdominal aorta. No acute  abnormalities are identified within the upper abdomen. No acute osseous  abnormalities are seen. There are degenerative changes of the  glenohumeral joints bilaterally.       Impression:         1. No evidence of aortic dissection.  2. Large hiatal hernia, larger than on prior study from 2019.     Radiation dose reduction techniques were utilized, including automated  exposure control and exposure modulation based on body size.        This report was finalized on 9/30/2023 8:12 PM by Dr. Ame Sanchez M.D.             I reviewed the CTA chest obtained September 30 directly.  It is a gated cardiac CTA exam.  There is excellent visualization of the proximal coronaries.  Left main bifurcates into the LAD and circumflex.  The circumflex does appear codominant giving PL branches.  I do not note any significant coronary tail calcifications.  There is mild motion artifact in the mid segment of the mid LAD which limits intravascular assessment.  The RCA appears widely patent but in the mid RCA there also is motion artifact.  I do not appreciate any significant coronary arterial calcifications    Gabi scan MPI October 1, 2023:  Interpretation Summary         Myocardial perfusion imaging indicates a probably normal myocardial perfusion study with no evidence of ischemia.    Left ventricular ejection fraction is hyperdynamic (Calculated EF > 70%).    Impressions are consistent with a low risk study.      Assessment:     Active Hospital Problems    Diagnosis  POA    **Chest pain [R07.9]  Yes      Resolved Hospital Problems   No resolved problems to display.          Plan:     Chest pain in the setting of hiatal hernia: Gated CTA chest, report per above, was without  any significant epicardial coronary disease.  However given the left bundle branch block on presentation, findings were concerning for a false negative exam.  She underwent a myocardial perfusion stress test which was normal.  The squeezing chest pain that was responsive to nitro in the setting of hiatal hernia is most likely consistent with esophageal spasm.  Will defer further work-up of esophageal spasm in the setting of hiatal hernia to ED team.  No further inpatient cardiac work-up required  Left bundle-branch block, intermittent: I suspect that this was related to hypertensive urgency on presentation.  This hypertensive urgency also cause myocardial injury, as noted in the troponin bump.  Again, no evidence of epicardial coronary disease on the CTA and this was confirmed on the perfusion stress test.  Hiatal hernia.  As above    Thank you for allowing me to participate in the care of Kajal Severino.  No further cardiac work-up required.  Patient can follow-up with PCP.  Will defer further work-up of esophageal spasm to the ED team.  Feel free to contact me directly with any further questions or concerns.    Billy Hess MD  [facility]  10/01/23  07:56 EDT      Part of this note may be an electronic transcription/translation of spoken language to printed text using the Dragon Dictation System.

## 2023-10-01 NOTE — PLAN OF CARE
Goal Outcome Evaluation:      Patient admitted to the observation unit for treatment of chest pain. Vss. No pain currently. Cardiology consulting. Will continue to monitor for any changes

## 2023-10-01 NOTE — PROGRESS NOTES
ED OBSERVATION PROGRESS/DISCHARGE SUMMARY    Date of Admission: 9/30/2023   LOS: 0 days   PCP: India Alanis APRN    Final Diagnosis atypical chest pain      Subjective     Hospital Outcome:   Patient is a pleasant afebrile ambulatory 88-year-old  female admitted to the ED observation unit for chest discomfort.  Her high-sensitivity troponin trended from 8-14 and back down to 10.  The CTA of her chest yesterday did not reveal any obvious pulmonary emboli or aortic dissection.  A large hiatal hernia was noted which is larger from prior imaging in 2019.    She underwent echocardiogram which showed ejection fraction of 60% with some left ventricular wall mild concentric hypertrophy.    She was seen and evaluated by Dr. Hess with the cardiology service who reviewed her lab work and EKG and there was concern regarding the left bundle branch block.  Stress test was ordered and this was found to be negative.  Patient recommended to follow-up with gastroenterology and her PCP for hiatal hernia and suspected esophageal spasming that was causing her discomfort yesterday.  We will discharge her home with a prescription for pantoprazole she has been off of the PPI for some time now.  Patient and family are agreeable to plan    ROS:  General: no fevers, chills  Respiratory: no cough, dyspnea  Cardiovascular: no chest pain, palpitations  Abdomen: No abdominal pain, nausea, vomiting, or diarrhea  Neurologic: No focal weakness    Objective   Physical Exam:  I have reviewed the vital signs.  Temp:  [97.7 °F (36.5 °C)-98.1 °F (36.7 °C)] 97.7 °F (36.5 °C)  Heart Rate:  [62-78] 68  Resp:  [16-18] 18  BP: (147-179)/(70-88) 176/88  General Appearance:    Alert, cooperative, no distress  Head:    Normocephalic, atraumatic  Eyes:    Sclerae anicteric  Neck:   Supple, no mass  Lungs: Clear to auscultation bilaterally, respirations unlabored  Heart: Regular rate and rhythm, S1 and S2 normal, no murmur, rub or gallop  Abdomen:   Soft, non-tender, bowel sounds active, nondistended  Extremities: No clubbing, cyanosis, or edema to lower extremities  Pulses:  2+ and symmetric in distal lower extremities  Skin: No rashes   Neurologic: Oriented x3, Normal strength to extremities    Results Review:    I have reviewed the labs, radiology results and diagnostic studies.    Results from last 7 days   Lab Units 10/01/23  0335   WBC 10*3/mm3 7.27   HEMOGLOBIN g/dL 12.4   HEMATOCRIT % 38.4   PLATELETS 10*3/mm3 217     Results from last 7 days   Lab Units 10/01/23  0335 09/30/23  1853   SODIUM mmol/L 141 139   POTASSIUM mmol/L 3.6 4.2   CHLORIDE mmol/L 106 106   CO2 mmol/L 24.2 24.0   BUN mg/dL 17 24*   CREATININE mg/dL 0.48* 0.56*   CALCIUM mg/dL 9.2 8.9   BILIRUBIN mg/dL  --  <0.2   ALK PHOS U/L  --  63   ALT (SGPT) U/L  --  13   AST (SGOT) U/L  --  17   GLUCOSE mg/dL 85 166*     Imaging Results (Last 24 Hours)       Procedure Component Value Units Date/Time    XR Chest 1 View [[id number]] Collected: 09/30/23 2319     Updated: 09/30/23 2323    Narrative:      SINGLE VIEW OF THE CHEST     HISTORY: Chest pain     COMPARISON: September 30, 2023     FINDINGS:  There is cardiomegaly. There is calcification of the aorta. No  pneumothorax, pleural effusion, or acute infiltrate is seen. Hiatal  hernia is again noted. There are advanced degenerative changes of the  shoulders bilaterally.       Impression:      No significant interval change.     This report was finalized on 9/30/2023 11:20 PM by Dr. Ame Sanchez M.D.       CT Angiogram Chest [[id number]] Collected: 09/30/23 1958     Updated: 09/30/23 2016    Narrative:      CT ANGIOGRAM OF THE CHEST     HISTORY: High blood pressure. Chest pain.     COMPARISON: April 9, 2019     TECHNIQUE: Axial CT imaging was obtained through the thorax. IV contrast  was administered. Three-D reformatted images were obtained.     FINDINGS:  No acute pulmonary thromboembolus is seen. The thoracic aorta is normal  in caliber.  No dissection is identified. There is atherosclerotic  involvement of the thoracic aorta. There is minimal coronary artery  calcification. Thyroid gland appears somewhat atrophic. There is a large  hiatal hernia. Mediastinal lymph nodes do not appear pathologically  enlarged. The hernia sac does appear larger than on the exam from April 2019. There is biapical scarring. Additional scarring is noted at both  lung bases. There are postsurgical changes noted at the left lung base.  No acute infiltrates are seen. Previously described nodule at the left  lung base, measuring 7 mm, is unchanged. No additional follow-up is  necessary. Images through the upper abdomen demonstrate additional  atherosclerotic involvement of the abdominal aorta. No acute  abnormalities are identified within the upper abdomen. No acute osseous  abnormalities are seen. There are degenerative changes of the  glenohumeral joints bilaterally.       Impression:         1. No evidence of aortic dissection.  2. Large hiatal hernia, larger than on prior study from 2019.     Radiation dose reduction techniques were utilized, including automated  exposure control and exposure modulation based on body size.        This report was finalized on 9/30/2023 8:12 PM by Dr. Ame Sanchez M.D.               I have reviewed the medications.  ---------------------------------------------------------------------------------------------  Assessment & Plan   Assessment/Problem List    Chest pain      Plan:    Chest pain   -Cardiac monitoring overnight did not reveal any arrhythmia  -Cardiology consult, cleared for discharge home  -High-sensitivity troponin 8, 14, delta 6  -EKG-sinus rhythm with first-degree AV block  -Stress testing negative  -Echocardiogram shows ejection fraction 60%  -CTA chest shows large hiatal hernia, ambulatory referral to GI order placed     Hypothyroidism  -Continue Synthroid  -TSH-0.208, free T4 1.14     Hypertension  -Continue  losartan    Disposition: Home    Follow-up after Discharge: PCP and GI    This note will serve as a discharge summary.    Bailey Mckoy, APRN 10/01/23 08:19 EDT    I have worn appropriate PPE during this patient encounter, sanitized my hands both with entering and exiting patient's room.      37 minutes has been spent by [facility] providers in the care of this patient while under observation status

## 2023-10-01 NOTE — OUTREACH NOTE
Prep Survey      Flowsheet Row Responses   [facility] patient discharged from? [place]   Is LACE score < 7 ? Yes   Eligibility [facility]   Date of Admission 09/30/23   Date of Discharge 10/01/23   Discharge Disposition Home or Self Care   Discharge diagnosis Chest pain   Does the patient have one of the following disease processes/diagnoses(primary or secondary)? Other   Does the patient have Home health ordered? No   Is there a DME ordered? No   Prep survey completed? Yes            Rosemary MORELOS - Registered Nurse

## 2023-10-01 NOTE — PROGRESS NOTES
PEACE COVARRUBIAS Attestation Note    I supervised care provided by the midlevel provider.    The JOSEPHINE and I have discussed this patient's history, physical exam, and treatment plan. I have reviewed the documentation and personally had a face to face interaction with the patient  I affirm the documentation and agree with the treatment and plan. I provided a substantive portion of the care of this patient.  I personally performed the physical exam, in its entirety.  My personal findings are documented in below:    History:  88-year-old female complain episode of chest pain that sort of radiated to her back and was relieved with nitroglycerin in route by EMS.  No recurrence of chest pain today.    Physical Exam:  General: No acute distress.  HENT: NCAT, PERRL, Nares patent.  Eyes: no scleral icterus.  Neck: trachea midline, no ROM limitations.  CV: Warm and well-perfused throughout.  Respiratory: normal effort, CTAB.  Abdomen: soft, nondistended without focal tenderness.  Musculoskeletal: no deformity.  Neuro: alert, moves all extremities, follows commands.  Skin: warm, dry.    Assessment and Plan:  Patient resting comfortably this morning.  Cardiology seen the patient recommend stress test.  Patient and family agreeable.

## 2023-10-02 ENCOUNTER — TRANSITIONAL CARE MANAGEMENT TELEPHONE ENCOUNTER (OUTPATIENT)
Dept: CALL CENTER | Facility: HOSPITAL | Age: 88
End: 2023-10-02
Payer: MEDICARE

## 2023-10-02 NOTE — OUTREACH NOTE
Call Center TCM Note      Flowsheet Row Responses   [facility] patient discharged from? [place]   Does the patient have one of the following disease processes/diagnoses(primary or secondary)? Other   TCM attempt successful? Yes   Call start time 1456   Call end time 1458   Discharge diagnosis Chest pain   Is patient permission given to speak with other caregiver? Yes   List who call center can speak with Keena Speedy   Person spoke with today (if not patient) and relationship Keena Ruff- daughter   Meds reviewed with patient/caregiver? Yes   Does the patient have all medications ordered at discharge? N/A   Is the patient taking all medications as directed (includes completed medication regime)? Yes   Does the patient have an appointment with their PCP within 7-14 days of discharge? Yes   Has home health visited the patient within 72 hours of discharge? N/A   Psychosocial issues? No   Did the patient receive a copy of their discharge instructions? Yes   Nursing interventions Reviewed instructions with patient   What is the patient's perception of their health status since discharge? Improving   Is the patient/caregiver able to teach back signs and symptoms related to disease process for when to call PCP? Yes   Is the patient/caregiver able to teach back signs and symptoms related to disease process for when to call 911? Yes   Is the patient/caregiver able to teach back the hierarchy of who to call/visit for symptoms/problems? PCP, Specialist, Home health nurse, Urgent Care, ED, 911 Yes   If the patient is a current smoker, are they able to teach back resources for cessation? Not a smoker   TCM call completed? Yes   Call end time 1458   Would this patient benefit from a Referral to Amb Social Work? No   Is the patient interested in additional calls from an ambulatory ? No            Courtney Duque LPN    10/2/2023, 14:59 EDT

## 2023-10-03 ENCOUNTER — HOSPITAL ENCOUNTER (OUTPATIENT)
Dept: GENERAL RADIOLOGY | Facility: HOSPITAL | Age: 88
Discharge: HOME | End: 2023-10-03
Payer: MEDICARE

## 2023-10-03 ENCOUNTER — ANESTHESIA (OUTPATIENT)
Dept: PAIN MEDICINE | Facility: HOSPITAL | Age: 88
End: 2023-10-03
Payer: MEDICARE

## 2023-10-03 ENCOUNTER — HOSPITAL ENCOUNTER (OUTPATIENT)
Dept: PAIN MEDICINE | Facility: HOSPITAL | Age: 88
Discharge: HOME | End: 2023-10-03
Payer: MEDICARE

## 2023-10-03 ENCOUNTER — ANESTHESIA EVENT (OUTPATIENT)
Dept: PAIN MEDICINE | Facility: HOSPITAL | Age: 88
End: 2023-10-03
Payer: MEDICARE

## 2023-10-03 VITALS
DIASTOLIC BLOOD PRESSURE: 74 MMHG | TEMPERATURE: 97.7 F | SYSTOLIC BLOOD PRESSURE: 152 MMHG | HEART RATE: 73 BPM | OXYGEN SATURATION: 95 % | RESPIRATION RATE: 16 BRPM

## 2023-10-03 DIAGNOSIS — M99.53 INTERVERTEBRAL DISC STENOSIS OF NEURAL CANAL OF LUMBAR REGION: ICD-10-CM

## 2023-10-03 DIAGNOSIS — R52 PAIN: ICD-10-CM

## 2023-10-03 PROCEDURE — 77003 FLUOROGUIDE FOR SPINE INJECT: CPT

## 2023-10-03 PROCEDURE — 25510000001 IOPAMIDOL 41 % SOLUTION: Performed by: ANESTHESIOLOGY

## 2023-10-03 PROCEDURE — 25010000002 METHYLPREDNISOLONE PER 80 MG: Performed by: ANESTHESIOLOGY

## 2023-10-03 RX ORDER — METHYLPREDNISOLONE ACETATE 80 MG/ML
80 INJECTION, SUSPENSION INTRA-ARTICULAR; INTRALESIONAL; INTRAMUSCULAR; SOFT TISSUE ONCE
Status: COMPLETED | OUTPATIENT
Start: 2023-10-03 | End: 2023-10-03

## 2023-10-03 RX ORDER — MIDAZOLAM HYDROCHLORIDE 1 MG/ML
1 INJECTION INTRAMUSCULAR; INTRAVENOUS ONCE AS NEEDED
Status: DISCONTINUED | OUTPATIENT
Start: 2023-10-03 | End: 2023-10-04 | Stop reason: HOSPADM

## 2023-10-03 RX ORDER — FENTANYL CITRATE 50 UG/ML
50 INJECTION, SOLUTION INTRAMUSCULAR; INTRAVENOUS ONCE
Status: DISCONTINUED | OUTPATIENT
Start: 2023-10-03 | End: 2023-10-04 | Stop reason: HOSPADM

## 2023-10-03 RX ORDER — LIDOCAINE HYDROCHLORIDE 10 MG/ML
1 INJECTION, SOLUTION INFILTRATION; PERINEURAL ONCE
Status: DISCONTINUED | OUTPATIENT
Start: 2023-10-03 | End: 2023-10-04 | Stop reason: HOSPADM

## 2023-10-03 RX ORDER — IOPAMIDOL 408 MG/ML
10 INJECTION, SOLUTION INTRATHECAL
Status: COMPLETED | OUTPATIENT
Start: 2023-10-03 | End: 2023-10-03

## 2023-10-03 RX ADMIN — IOPAMIDOL 10 ML: 408 INJECTION, SOLUTION INTRATHECAL at 09:55

## 2023-10-03 RX ADMIN — METHYLPREDNISOLONE ACETATE 80 MG: 80 INJECTION, SUSPENSION INTRA-ARTICULAR; INTRALESIONAL; INTRAMUSCULAR; SOFT TISSUE at 09:56

## 2023-10-03 NOTE — H&P
[facility]    History and Physical    Patient Name: Kajal Severino  :  1935  MRN:  [id number]  Date of Admission: 10/3/2023    Subjective     Patient is a 88 y.o. female presents with chief complaint of chronic, moderate low back pain and right leg pain.  Onset of symptoms was gradual starting several years ago.  Symptoms are associated/aggravated by activity. Symptoms improve with injection - more than 50% improvement.  Ms. Severino does seem a bit confused today.  Reports had some chest pain this weekend which was resulted in a negative cardiac workup.  Presents today for lesi.      The following portions of the patients history were reviewed and updated as appropriate: current medications, allergies, past medical history, past surgical history, past family history, past social history, and problem list                Objective     Past Medical History:   Past Medical History:   Diagnosis Date    Allergic rhinitis     Back pain, lumbosacral     Depression     Erythrocytosis     Fibrocystic breast     H/O bone density study 2014    Headache     History of anemia     History of chest pain     History of colonic polyps     History of prior pregnancies     x7    Hyperlipidemia     Hypertension     Hypothyroidism     Intervertebral disc stenosis of neural canal of lumbar region 2022    Low back pain     Osteoarthritis     Osteoporosis     Peripheral neuropathy     Potassium deficiency     Shortness of breath      Past Surgical History:   Past Surgical History:   Procedure Laterality Date    BREAST BIOPSY Bilateral 1980    x3     SECTION N/A     x1    COLONOSCOPY N/A 10/05/2012    + Diverticulosis and polyps    EPIDURAL BLOCK      PAP SMEAR N/A 11/15/2006     Family History:   Family History   Problem Relation Age of Onset    Heart disease Mother     Hypertension Mother     Diabetes Father     Heart disease Father     Hypertension Father     Breast cancer Sister     Leukemia Brother          CML    Breast cancer Daughter     Hypertension Daughter     Thyroid disease Daughter     Colon cancer Brother     Prostate cancer Brother     Prostate cancer Brother     Kidney cancer Brother     Hypertension Daughter     Hypertension Son      Social History:   Social History     Socioeconomic History    Marital status:     Number of children: 4    Years of education: High School   Tobacco Use    Smoking status: Former     Packs/day: 0.25     Years: 15.00     Pack years: 3.75     Types: Cigarettes     Quit date: 1995     Years since quittin.7    Smokeless tobacco: Never   Vaping Use    Vaping Use: Never used   Substance and Sexual Activity    Alcohol use: No    Drug use: No    Sexual activity: Not Currently     Partners: Male       Vital Signs Range for the last 24 hours  Temperature:     Temp Source:     BP:     Pulse:     Respirations:     SPO2:     O2 Amount (l/min):     O2 Devices     Weight:           --------------------------------------------------------------------------------    Current Outpatient Medications   Medication Sig Dispense Refill    acetaminophen (TYLENOL) 650 MG 8 hr tablet Take 1 tablet by mouth Every 8 (Eight) Hours As Needed for Mild Pain.      escitalopram (LEXAPRO) 10 MG tablet TAKE 1 AND 1/2 TABLETS BY MOUTH DAILY 135 tablet 0    fluticasone (FLONASE) 50 MCG/ACT nasal spray SHAKE LIQUID AND USE 2 SPRAYS IN EACH NOSTRIL DAILY 48 g 1    gabapentin (NEURONTIN) 300 MG capsule TAKE 1 CAPSULE BY MOUTH EVERY NIGHT 90 capsule 1    levothyroxine (Synthroid) 112 MCG tablet Take 1 tablet Friday- 30 tablet 5    levothyroxine (SYNTHROID, LEVOTHROID) 125 MCG tablet 1 tablet Mon-Thur 90 tablet 1    losartan (COZAAR) 100 MG tablet TAKE 1 TABLET BY MOUTH DAILY 90 tablet 1    meloxicam (MOBIC) 7.5 MG tablet TAKE 1 TABLET BY MOUTH DAILY WITH FOOD 30 tablet 2    cetirizine (zyrTEC) 10 MG tablet Take 1 tablet by mouth Daily for 10 days. 10 tablet      Current Facility-Administered  Medications   Medication Dose Route Frequency Provider Last Rate Last Admin    fentaNYL citrate (PF) (SUBLIMAZE) injection 50 mcg  50 mcg Intravenous Once Leslee Lincoln MD        iopamidol (ISOVUE-M 200) injection 41%  10 mL Epidural Once in imaging Leslee Lincoln MD        lidocaine (XYLOCAINE) 1 % injection 1 mL  1 mL Intradermal Once Leslee Lincoln MD        methylPREDNISolone acetate (DEPO-medrol) injection 80 mg  80 mg Epidural Once Leslee Lincoln MD        midazolam (VERSED) injection 1 mg  1 mg Intravenous Once PRN Leslee Lincoln MD           --------------------------------------------------------------------------------  Assessment & Plan      Anesthesia Evaluation     Patient summary reviewed and Nursing notes reviewed   no history of anesthetic complications:                Airway   Mallampati: II  Dental      Pulmonary    (+) ,shortness of breath  Cardiovascular     (+) hypertension, hyperlipidemia      Neuro/Psych  (+) headaches, numbness, psychiatric history  GI/Hepatic/Renal/Endo    (+) thyroid problem hypothyroidism    Musculoskeletal     (+) back pain, chronic pain, radiculopathy  Abdominal    Substance History - negative use     OB/GYN negative ob/gyn ROS         Other   arthritis,                Diagnosis and Plan    Treatment Plan  ASA 2   Patient has had previous injection/procedure with 50-75% improvement.   Procedures: Lumbar Epidural Steroid Injection(LESI), With fluoroscopy,      Anesthetic plan and risks discussed with patient.        Diagnosis     * Intervertebral disc stenosis of neural canal of lumbar region [M99.53]     * Lumbar radiculopathy [M54.16]

## 2023-10-03 NOTE — ANESTHESIA PROCEDURE NOTES
PAIN Epidural block    Pre-sedation assessment completed: 10/3/2023 9:50 AM    Patient reassessed immediately prior to procedure    Patient location during procedure: pain clinic  Start Time: 10/3/2023 9:50 AM  Stop Time: 10/3/2023 10:00 AM  Indication:procedure for pain  Performed By  Anesthesiologist: Leslee Lincoln MD  Preanesthetic Checklist  Completed: patient identified, IV checked, site marked, risks and benefits discussed, surgical consent, monitors and equipment checked, pre-op evaluation and timeout performed  Additional Notes  Fluoro used.    Prep:  Pt Position:prone  Sterile Tech:cap, gloves, mask and sterile barrier  Prep:chlorhexidine gluconate and isopropyl alcohol  Monitoring:blood pressure monitoring, continuous pulse oximetry and EKG  Procedure:Sedation: no     Approach:right paramedian  Guidance: fluoroscopy  Location:lumbar  Level:L5-S1  Needle Type:Tuohy  Needle Gauge:20  Aspiration:negative  Medications:  Preservative Free Saline:3mL  Isovue:1mL  Comments:Dye spread c/w epidurogram  Dx: lumbar radiculopathyDepomedrol:80  Post Assessment:  Dressing:occlusive dressing applied  Pt Tolerance:patient tolerated the procedure well with no apparent complications  Complications:no

## 2023-10-03 NOTE — DISCHARGE INSTRUCTIONS

## 2023-10-11 ENCOUNTER — OFFICE VISIT (OUTPATIENT)
Dept: INTERNAL MEDICINE | Facility: CLINIC | Age: 88
End: 2023-10-11
Payer: MEDICARE

## 2023-10-11 VITALS
WEIGHT: 143.4 LBS | DIASTOLIC BLOOD PRESSURE: 82 MMHG | HEART RATE: 77 BPM | HEIGHT: 62 IN | OXYGEN SATURATION: 92 % | SYSTOLIC BLOOD PRESSURE: 144 MMHG | BODY MASS INDEX: 26.39 KG/M2

## 2023-10-11 DIAGNOSIS — B35.1 TOENAIL FUNGUS: ICD-10-CM

## 2023-10-11 DIAGNOSIS — K44.9 HIATAL HERNIA: Primary | ICD-10-CM

## 2023-10-11 DIAGNOSIS — Z23 NEED FOR INFLUENZA VACCINATION: ICD-10-CM

## 2023-10-11 DIAGNOSIS — R07.9 CHEST PAIN, UNSPECIFIED TYPE: ICD-10-CM

## 2023-10-11 NOTE — PROGRESS NOTES
Transitional Care Follow Up Visit  Subjective     Kajal Severino is a 88 y.o. female who presents for a transitional care management visit.    Within 48 business hours after discharge our office contacted her via telephone to coordinate her care and needs.      I reviewed and discussed the details of that call along with the discharge summary, hospital problems, inpatient lab results, inpatient diagnostic studies, and consultation reports with Kajal.     Current outpatient and discharge medications have been reconciled for the patient.  Reviewed by: HAILY Burrell          10/1/2023     4:21 PM   Date of TCM Phone Call   [facility]   Date of Admission 9/30/2023   Date of Discharge 10/1/2023   Discharge Disposition Home or Self Care     Risk for Readmission (LACE) No data recorded      History of Present Illness   Course During Hospital Stay:      Kajal Severino, date of birth 1935. Patient presents today for follow-up regarding chest pain. The patient is accompanied by her daughter.    She presented to the ER 9/30/23 due to severe anterior chest pain with an elevated blood pressure. Her sx resolved with NTG but was admitted for further evaluation. Her CTA chest showed a large hiatal hernia but otherwise no acute abnormalities. She underwent a stress test which was negative. Her echo showed an EF of 60% with grade 1 diastolic dysfunction.    She denies dyspepsia and/or abdominal pain. She takes Pepcid and Maalox in the past but not consistently. The hiatal hernia has increased in size since 2019.    She continues to c/o neck pain and stiffness along with low back pain. She takes Meloxicam 7.5 g daily along with gabapentin. Kidney function has remained stable.     The following portions of the patient's history were reviewed and updated as appropriate: allergies, current medications, past family history, past medical history, past social history, past surgical history, and problem  list.    Review of Systems    Objective   Physical Exam  Vitals reviewed.   Constitutional:       Appearance: She is well-developed.   HENT:      Head: Normocephalic and atraumatic.      Right Ear: Tympanic membrane and external ear normal.      Left Ear: Tympanic membrane and external ear normal.      Nose: Nose normal.   Eyes:      Conjunctiva/sclera: Conjunctivae normal.      Pupils: Pupils are equal, round, and reactive to light.   Neck:      Thyroid: No thyromegaly.      Vascular: No JVD.   Cardiovascular:      Rate and Rhythm: Normal rate and regular rhythm.      Pulses:           Dorsalis pedis pulses are 2+ on the right side and 2+ on the left side.        Posterior tibial pulses are 2+ on the right side and 2+ on the left side.      Heart sounds: Normal heart sounds.   Pulmonary:      Effort: Pulmonary effort is normal.      Breath sounds: Normal breath sounds.   Abdominal:      General: Bowel sounds are normal.      Palpations: Abdomen is soft.   Musculoskeletal:         General: Normal range of motion.      Cervical back: Normal range of motion and neck supple.      Right foot: Normal range of motion.      Left foot: Normal range of motion.   Feet:      Right foot:      Protective Sensation: 10 sites tested.  10 sites sensed.      Skin integrity: Skin integrity normal. No ulcer, blister or skin breakdown.      Toenail Condition: Right toenails are abnormally thick.      Left foot:      Protective Sensation: 10 sites tested.  10 sites sensed.      Skin integrity: Skin integrity normal. No ulcer, blister or skin breakdown.      Toenail Condition: Left toenails are abnormally thick.      Comments:     Lymphadenopathy:      Cervical: No cervical adenopathy.   Skin:     General: Skin is warm and dry.      Findings: No rash.   Neurological:      Mental Status: She is alert and oriented to person, place, and time.      Cranial Nerves: No cranial nerve deficit.      Coordination: Coordination normal.    Psychiatric:         Behavior: Behavior normal.         Thought Content: Thought content normal.         Judgment: Judgment normal.         Assessment & Plan   Diagnoses and all orders for this visit:    1. Hiatal hernia (Primary)  Assessment & Plan:  Discussed how hiatal hernia can cause epigastric and chest pain, will try to avoid surgical options. She may take Pepcid daily and eat small, frequent meals.        2. Chest pain, unspecified type  Assessment & Plan:  Reviewed hospital records and diagnostic testing with patient and daughter, sx appear more GI in nature than cardiac.      3. Toenail fungus  Comments:  Referred the patient to a new podiatrist.  Assessment & Plan:  Referral made to new podiatrist      4. Need for influenza vaccination  -     Fluzone High-Dose 65+yrs                Transcribed from ambient dictation for India Alanis, HAILY by Caitie Espinosa.  10/11/23   19:16 EDT    Patient or patient representative verbalized consent to the visit recording.  I have personally performed the services described in this document as transcribed by the above individual, and it is both accurate and complete.

## 2023-10-19 ENCOUNTER — HOSPITAL ENCOUNTER (OUTPATIENT)
Facility: HOSPITAL | Age: 88
Discharge: HOME | End: 2023-10-19
Admitting: NURSE PRACTITIONER
Payer: MEDICARE

## 2023-10-19 DIAGNOSIS — Z78.0 POSTMENOPAUSAL: ICD-10-CM

## 2023-10-19 PROCEDURE — 77080 DXA BONE DENSITY AXIAL: CPT

## 2023-10-20 DIAGNOSIS — E03.9 ACQUIRED HYPOTHYROIDISM: ICD-10-CM

## 2023-10-22 PROBLEM — B35.1 TOENAIL FUNGUS: Status: ACTIVE | Noted: 2023-10-22

## 2023-10-22 PROBLEM — K44.9 HIATAL HERNIA: Status: ACTIVE | Noted: 2023-10-22

## 2023-10-22 NOTE — ASSESSMENT & PLAN NOTE
Discussed how hiatal hernia can cause epigastric and chest pain, will try to avoid surgical options. She may take Pepcid daily and eat small, frequent meals.

## 2023-10-22 NOTE — ASSESSMENT & PLAN NOTE
Reviewed hospital records and diagnostic testing with patient and daughter, sx appear more GI in nature than cardiac.

## 2023-10-23 RX ORDER — LEVOTHYROXINE SODIUM 0.12 MG/1
TABLET ORAL
Qty: 90 TABLET | Refills: 1 | Status: SHIPPED | OUTPATIENT
Start: 2023-10-23

## 2023-11-10 DIAGNOSIS — M99.53 INTERVERTEBRAL DISC STENOSIS OF NEURAL CANAL OF LUMBAR REGION: Primary | ICD-10-CM

## 2023-11-10 DIAGNOSIS — G89.29 CHRONIC BILATERAL LOW BACK PAIN WITH RIGHT-SIDED SCIATICA: Chronic | ICD-10-CM

## 2023-11-10 DIAGNOSIS — M54.41 CHRONIC BILATERAL LOW BACK PAIN WITH RIGHT-SIDED SCIATICA: Chronic | ICD-10-CM

## 2023-11-14 ENCOUNTER — OFFICE VISIT (OUTPATIENT)
Dept: ORTHOPEDIC SURGERY | Facility: CLINIC | Age: 88
End: 2023-11-14
Payer: MEDICARE

## 2023-11-14 VITALS — HEIGHT: 62 IN | WEIGHT: 140.6 LBS | BODY MASS INDEX: 25.88 KG/M2 | TEMPERATURE: 97.8 F

## 2023-11-14 DIAGNOSIS — M54.16 LUMBAR RADICULOPATHY: Primary | ICD-10-CM

## 2023-11-14 DIAGNOSIS — M54.50 LUMBAR SPINE PAIN: ICD-10-CM

## 2023-11-14 PROCEDURE — 99213 OFFICE O/P EST LOW 20 MIN: CPT | Performed by: NURSE PRACTITIONER

## 2023-11-14 RX ORDER — IBUPROFEN 200 MG
200 TABLET ORAL EVERY 6 HOURS PRN
COMMUNITY

## 2023-11-14 NOTE — PROGRESS NOTES
Patient Name: Kajal Severino   YOB: 1935  Referring Primary Care Physician: India Alanis APRN      Chief Complaint:    Chief Complaint   Patient presents with    Lumbar Spine - Pain, Initial Evaluation        Pt also has neck pain    Back Pain  This is a chronic problem. The current episode started more than 1 year ago. The problem occurs constantly. The problem has been gradually worsening since onset. The pain is present in the lumbar spine. The quality of the pain is described as aching. Radiates to: rt buttocks. The pain is at a severity of 9/10. The pain is severe. The pain is The same all the time. Exacerbated by: Going from sitting to standing, up and down the stairs. Stiffness is present All day. Associated symptoms include leg pain (right shin pain). Pertinent negatives include no bladder incontinence, bowel incontinence, numbness, tingling or weakness. She has tried NSAIDs and heat (Voltaren gel) for the symptoms.        HPI:  Kajal Severino is a 88 y.o. female who presents to [facility] for evaluation of above complaints.  This is a previously established patient last saw Dr. Pro in October 2020.  At that time she was referred for lumbar epidural injections.  She has done very well with the injections.  In reviewing records it appears she has had epidurals almost every 3 months since that time.  She says unfortunately with the last injection last month, she did not gain relief and says actually pain was worse afterwards.  No bowel or bladder dysfunction or saddle anesthesia.  She is accompanied by her son today.   Prior pertinent records were reviewed.    PFSH:  See attached    ROS: As per HPI, otherwise negative    Objective:      88 y.o. female  Body mass index is 25.72 kg/m²., 63.8 kg (140 lb 9.6 oz), @HT@  Vitals:    11/14/23 1027   Temp: 97.8 °F (36.6 °C)     Pain Score    11/14/23 1027   PainSc:   9   PainLoc: Back            Spine Musculoskeletal  Exam    Gait    Antalgic: right    Inspection    Kyphosis: thoracolumbar kyphosis    Palpation    Thoracolumbar    Tenderness: none    Strength    Thoracolumbar    Thoracolumbar motor exam is normal.       Sensory    Thoracolumbar    Thoracolumbar sensation is normal.    Reflexes    Right      Quadriceps: 2/4      Achilles: 0/4     Left      Quadriceps: 2/4      Achilles: 0/4    Special Tests    Thoracolumbar      Right      USHA test: negative      SLR: no back or leg pain      Left      SLR: no back or leg pain    General      Constitutional: well-developed and well-nourished    Scleral icterus: no    Labored breathing: no    Psychiatric: normal mood and affect and no acute distress    Neurological: alert and oriented x3    Skin: intact        IMAGING:     Indication: pain related symptoms,  Views: 2V AP&LAT lumbar  Findings: Reviewed and reveals about 12 mm anterolisthesis L5 on S1 unchanged from 10/6/2020.  Multilevel degenerative disc and facet changes.  No obvious fractures, slight dextroscoliosis apex in the mid lumbar spine.  Comparison views: No significant change from 10/6/2020    Assessment:           Diagnoses and all orders for this visit:    1. Lumbar radiculopathy (Primary)  -     Ambulatory Referral to Physical Therapy Evaluate and treat  -     MRI Lumbar Spine Without Contrast; Future    2. Lumbar spine pain  -     XR Spine Lumbar 2 or 3 View             Plan:  Unfortunately she has had fairly significant radicular pain since previous epidural injection.  She was walking fairly regularly for exercise until that epidural.  She would like to get back into an exercise program so we will get her into physical therapy for pain relieving techniques, strength and stretching.  We will also repeat lumbar MRI to help guide pain management towards other injection options or change in location of epidural so that she can remain active.  We talked about deconditioning today and the hazardous effects it could  have on her health.  Not likely a surgical candidate and she fortunately has no loss of nerve function on exam today, however MRI is justified to help manage current life-limiting symptoms.  We will call with the results of the MRI and make established patient referral to the hospital with any injection changes.  Otherwise she will follow-up as needed.    Return for Call with results.    EMR Dragon/Transcription Disclaimer:   Much of this encounter note is an electronic transcription/translation of spoken language to printed text. The electronic translation of spoken language may permit erroneous, or at times, nonsensical words or phrases to be inadvertently transcribed; Although I have reviewed the note for such errors, some may still exist.  Red flags have been discussed at this or previous visits to include but not limited weakness in extremities, worsening pain that does not respond to conservative treatment and bowel or bladder dysfunction. These are reasons to present to ER and patient has been informed.    The diagnosis(es), natural history, pathophysiology and treatment for diagnosis(es) were discussed. Opportunity given and questions answered. Biomechanics of pertinent body areas discussed.    EXERCISES:  Advice on benefits of, and types of regular/moderate exercise pertaining to diagnosis.  Continue HEP. For back or neck pain, recommend pilates and or yoga as tolerated. Generally it is best to start any new exercise under the guidance of a  or therapist.   MEDICATIONS:  When prescribe, the risks, benefits, warnings,side effects and alternatives of medications discussed. Advised against long term use of narcotics.   PAIN CONTROL:  Cold, heat, OTC lidocaine patches and/or ointment as needed. Avoid direct skin contact with ice. Ice 15-20 minutes 3-4 times daily as needed. For SI joint pain, recommend ice bath in water about 50 degrees for 5 consecutive days, add ice slowly to help with adjustment and  may cover with warm towel or robe to help with cold tolerance. If using lidocaine, do not apply heat in conjunction as this can cause a burn.   MEDICAL RECORDS reviewed from other provider(s) for past and current medical history pertinent to this visit..

## 2023-11-15 ENCOUNTER — TELEPHONE (OUTPATIENT)
Dept: ORTHOPEDIC SURGERY | Facility: CLINIC | Age: 88
End: 2023-11-15

## 2023-11-15 NOTE — TELEPHONE ENCOUNTER
Caller: ETHAN   Relationship to Patient: DAUGHTER  Phone Number: [phone]  Reason for Call: [facility] HAS NOT RECEIVED THE PATIENTS MRI ORDER

## 2023-11-21 ENCOUNTER — TELEPHONE (OUTPATIENT)
Dept: ORTHOPEDIC SURGERY | Facility: CLINIC | Age: 88
End: 2023-11-21
Payer: MEDICARE

## 2023-11-27 DIAGNOSIS — M54.16 LUMBAR RADICULOPATHY: Primary | ICD-10-CM

## 2023-11-28 ENCOUNTER — HOME HEALTH ADMISSION (OUTPATIENT)
Dept: HOME HEALTH SERVICES | Facility: HOME HEALTHCARE | Age: 88
End: 2023-11-28
Payer: MEDICARE

## 2023-12-06 ENCOUNTER — ANESTHESIA (OUTPATIENT)
Dept: PAIN MEDICINE | Facility: HOSPITAL | Age: 88
End: 2023-12-06
Payer: MEDICARE

## 2023-12-06 ENCOUNTER — HOSPITAL ENCOUNTER (OUTPATIENT)
Dept: GENERAL RADIOLOGY | Facility: HOSPITAL | Age: 88
Discharge: HOME | End: 2023-12-06
Payer: MEDICARE

## 2023-12-06 ENCOUNTER — ANESTHESIA EVENT (OUTPATIENT)
Dept: PAIN MEDICINE | Facility: HOSPITAL | Age: 88
End: 2023-12-06
Payer: MEDICARE

## 2023-12-06 ENCOUNTER — HOSPITAL ENCOUNTER (OUTPATIENT)
Dept: PAIN MEDICINE | Facility: HOSPITAL | Age: 88
Discharge: HOME | End: 2023-12-06
Payer: MEDICARE

## 2023-12-06 VITALS
DIASTOLIC BLOOD PRESSURE: 70 MMHG | SYSTOLIC BLOOD PRESSURE: 128 MMHG | RESPIRATION RATE: 16 BRPM | HEART RATE: 69 BPM | OXYGEN SATURATION: 94 % | TEMPERATURE: 97.8 F

## 2023-12-06 DIAGNOSIS — M54.16 LUMBAR RADICULOPATHY: Primary | ICD-10-CM

## 2023-12-06 DIAGNOSIS — R52 PAIN: ICD-10-CM

## 2023-12-06 PROCEDURE — 25010000002 DEXAMETHASONE SODIUM PHOSPHATE 10 MG/ML SOLUTION: Performed by: ANESTHESIOLOGY

## 2023-12-06 PROCEDURE — 77003 FLUOROGUIDE FOR SPINE INJECT: CPT

## 2023-12-06 PROCEDURE — 25510000001 IOPAMIDOL 41 % SOLUTION: Performed by: ANESTHESIOLOGY

## 2023-12-06 RX ORDER — LIDOCAINE HYDROCHLORIDE 10 MG/ML
1 INJECTION, SOLUTION INFILTRATION; PERINEURAL ONCE
Status: DISCONTINUED | OUTPATIENT
Start: 2023-12-06 | End: 2023-12-07 | Stop reason: HOSPADM

## 2023-12-06 RX ORDER — FENTANYL CITRATE 50 UG/ML
100 INJECTION, SOLUTION INTRAMUSCULAR; INTRAVENOUS ONCE
Status: DISCONTINUED | OUTPATIENT
Start: 2023-12-06 | End: 2023-12-07 | Stop reason: HOSPADM

## 2023-12-06 RX ORDER — IOPAMIDOL 408 MG/ML
2 INJECTION, SOLUTION INTRATHECAL
Status: COMPLETED | OUTPATIENT
Start: 2023-12-06 | End: 2023-12-06

## 2023-12-06 RX ORDER — MIDAZOLAM HYDROCHLORIDE 1 MG/ML
2 INJECTION INTRAMUSCULAR; INTRAVENOUS ONCE
Status: DISCONTINUED | OUTPATIENT
Start: 2023-12-06 | End: 2023-12-07 | Stop reason: HOSPADM

## 2023-12-06 RX ORDER — DEXAMETHASONE SODIUM PHOSPHATE 10 MG/ML
10 INJECTION, SOLUTION INTRAMUSCULAR; INTRAVENOUS ONCE
Status: COMPLETED | OUTPATIENT
Start: 2023-12-06 | End: 2023-12-06

## 2023-12-06 RX ADMIN — IOPAMIDOL 10 ML: 408 INJECTION, SOLUTION INTRATHECAL at 12:09

## 2023-12-06 RX ADMIN — DEXAMETHASONE SODIUM PHOSPHATE 10 MG: 10 INJECTION, SOLUTION INTRAMUSCULAR; INTRAVENOUS at 12:09

## 2023-12-06 NOTE — ANESTHESIA PROCEDURE NOTES
Transforaminal epidural, right, lumbar 4/5 and lumbar 5/sacral 1    Pre-sedation assessment completed: 12/6/2023 12:06 PM    Patient reassessed immediately prior to procedure    Patient location during procedure: pain clinic  Start Time: 12/6/2023 12:06 PM  Stop Time: 12/6/2023 12:25 PM  Indication:at surgeon's request and procedure for pain  Performed By  Anesthesiologist: Theodore Lo MD  Preanesthetic Checklist  Completed: patient identified, IV checked, site marked, risks and benefits discussed, surgical consent, monitors and equipment checked, pre-op evaluation and timeout performed  Additional Notes  Dx : Post-Op Diagnosis Codes:     * Lumbar radiculopathy [M54.16]     * 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  Needle Type:Other (blunt tip epimed needle 22 G)  Needle Gauge:22 G  Aspiration:negative  Test Dose:negative  Medications:  Isovue:2mL  Comments:Dexamethasone 10 mg  Lidocaine 1% - 1cc  Post Assessment:  Pt Tolerance:patient tolerated the procedure well with no apparent complications  Complications:no

## 2023-12-06 NOTE — DISCHARGE INSTRUCTIONS

## 2023-12-06 NOTE — H&P
INTERVAL HISTORY:    The patient returns for another Lumbar epidural steroid injection today.  They have received 0% improvement since their last injection with a pain level of 9 /10 at its worst recently.  She has had translaminar epidurals in the past but today we are going to do a transforaminal epidural, right, lumbar 4/5 and lumbar 5/sacral 1  Conservative measures tried in the interim. Daily activities are still affected by the pain.    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  Physical therapy exercises at home as prescribed by physical therapy or from the pain clinic handout (given to the patient).  Continuation of these exercises every day, or multiple times per week, even when the patient has good pain relief, was stressed to the patient as a preventative measure to decrease the frequency and severity of future pain episodes.  2.  Continue pain medicines as already prescribed.  If patient not currently taking any, it is recommended to begin Acetaminophen 1000 mg po q 8 hours.  If other medicines containing Acetaminophen are currently prescribed, maintain daily dose at 3000mg.    3.  If they can tolerate NSAIDS, it is recommended to take Ibuprofen 600 mg po q 6 hours for 7 days during pain exacerbations.   Alternatively, they may substitute an NSAID of their choice (e.g. Aleve)  4.   Heat and ice to the affected area as tolerated for pain control.  It was discussed that heating pads can cause burns.  5.  Low impact exercise such as walking or water exercise was recommended to maintain overall health and aid in weight control.   6.  Follow up as needed for subsequent injections.  7.  Patient was counseled to abstain from tobacco products.    Time : 27   min

## 2023-12-20 DIAGNOSIS — M54.16 LUMBAR RADICULOPATHY: ICD-10-CM

## 2023-12-24 DIAGNOSIS — F33.9 MONOPOLAR DEPRESSION: Chronic | ICD-10-CM

## 2023-12-24 RX ORDER — ESCITALOPRAM OXALATE 10 MG/1
TABLET ORAL
Qty: 135 TABLET | Refills: 0 | Status: SHIPPED | OUTPATIENT
Start: 2023-12-24

## 2024-01-06 DIAGNOSIS — R52 DIFFUSE PAIN: ICD-10-CM

## 2024-01-08 NOTE — TELEPHONE ENCOUNTER
Rx Refill Note  Requested Prescriptions     Pending Prescriptions Disp Refills    gabapentin (NEURONTIN) 300 MG capsule [Pharmacy Med Name: GABAPENTIN 300MG CAPSULES] 90 capsule      Sig: TAKE 1 CAPSULE BY MOUTH EVERY NIGHT      Last office visit with prescribing clinician: 10/11/2023   Last telemedicine visit with prescribing clinician: Visit date not found   Next office visit with prescribing clinician: 2/13/2024                         Would you like a call back once the refill request has been completed: [] Yes [] No    If the office needs to give you a call back, can they leave a voicemail: [] Yes [] No    Rosa Fritz  01/08/24, 08:38 EST

## 2024-01-10 DIAGNOSIS — R52 DIFFUSE PAIN: ICD-10-CM

## 2024-01-11 RX ORDER — GABAPENTIN 300 MG/1
300 CAPSULE ORAL NIGHTLY
Qty: 90 CAPSULE | Refills: 1 | OUTPATIENT
Start: 2024-01-11

## 2024-01-11 RX ORDER — GABAPENTIN 300 MG/1
300 CAPSULE ORAL NIGHTLY
Qty: 90 CAPSULE | Refills: 1 | Status: SHIPPED | OUTPATIENT
Start: 2024-01-11

## 2024-02-09 DIAGNOSIS — I10 ESSENTIAL HYPERTENSION: ICD-10-CM

## 2024-02-12 RX ORDER — LOSARTAN POTASSIUM 100 MG/1
TABLET ORAL
Qty: 90 TABLET | Refills: 1 | Status: SHIPPED | OUTPATIENT
Start: 2024-02-12

## 2024-02-13 ENCOUNTER — OFFICE VISIT (OUTPATIENT)
Dept: INTERNAL MEDICINE | Facility: CLINIC | Age: 89
End: 2024-02-13
Payer: MEDICARE

## 2024-02-13 VITALS
HEIGHT: 62 IN | WEIGHT: 134 LBS | HEART RATE: 73 BPM | BODY MASS INDEX: 24.66 KG/M2 | OXYGEN SATURATION: 97 % | DIASTOLIC BLOOD PRESSURE: 86 MMHG | SYSTOLIC BLOOD PRESSURE: 126 MMHG

## 2024-02-13 DIAGNOSIS — E03.9 ACQUIRED HYPOTHYROIDISM: Chronic | ICD-10-CM

## 2024-02-13 DIAGNOSIS — I10 PRIMARY HYPERTENSION: Chronic | ICD-10-CM

## 2024-02-13 DIAGNOSIS — F33.9 MONOPOLAR DEPRESSION: Chronic | ICD-10-CM

## 2024-02-13 DIAGNOSIS — Z00.00 MEDICARE ANNUAL WELLNESS VISIT, SUBSEQUENT: Primary | ICD-10-CM

## 2024-02-13 DIAGNOSIS — E55.9 VITAMIN D DEFICIENCY: Chronic | ICD-10-CM

## 2024-02-13 PROBLEM — R07.9 CHEST PAIN: Status: RESOLVED | Noted: 2023-09-30 | Resolved: 2024-02-13

## 2024-02-13 LAB
25(OH)D3+25(OH)D2 SERPL-MCNC: 57.9 NG/ML (ref 30–100)
ALBUMIN SERPL-MCNC: 4.2 G/DL (ref 3.5–5.2)
ALBUMIN/GLOB SERPL: 1.8 G/DL
ALP SERPL-CCNC: 74 U/L (ref 39–117)
ALT SERPL-CCNC: 12 U/L (ref 1–33)
AST SERPL-CCNC: 18 U/L (ref 1–32)
BILIRUB SERPL-MCNC: 0.4 MG/DL (ref 0–1.2)
BUN SERPL-MCNC: 24 MG/DL (ref 8–23)
BUN/CREAT SERPL: 30.4 (ref 7–25)
CALCIUM SERPL-MCNC: 9.8 MG/DL (ref 8.6–10.5)
CHLORIDE SERPL-SCNC: 104 MMOL/L (ref 98–107)
CO2 SERPL-SCNC: 28.9 MMOL/L (ref 22–29)
CREAT SERPL-MCNC: 0.79 MG/DL (ref 0.57–1)
EGFRCR SERPLBLD CKD-EPI 2021: 72.1 ML/MIN/1.73
ERYTHROCYTE [DISTWIDTH] IN BLOOD BY AUTOMATED COUNT: 11.5 % (ref 12.3–15.4)
GLOBULIN SER CALC-MCNC: 2.4 GM/DL
GLUCOSE SERPL-MCNC: 84 MG/DL (ref 65–99)
HCT VFR BLD AUTO: 37.8 % (ref 34–46.6)
HGB BLD-MCNC: 12.3 G/DL (ref 12–15.9)
MCH RBC QN AUTO: 30.4 PG (ref 26.6–33)
MCHC RBC AUTO-ENTMCNC: 32.5 G/DL (ref 31.5–35.7)
MCV RBC AUTO: 93.3 FL (ref 79–97)
PLATELET # BLD AUTO: 194 10*3/MM3 (ref 140–450)
POTASSIUM SERPL-SCNC: 4 MMOL/L (ref 3.5–5.2)
PROT SERPL-MCNC: 6.6 G/DL (ref 6–8.5)
RBC # BLD AUTO: 4.05 10*6/MM3 (ref 3.77–5.28)
SODIUM SERPL-SCNC: 142 MMOL/L (ref 136–145)
TSH SERPL DL<=0.005 MIU/L-ACNC: 2.22 UIU/ML (ref 0.27–4.2)
WBC # BLD AUTO: 5.64 10*3/MM3 (ref 3.4–10.8)

## 2024-03-23 DIAGNOSIS — F33.9 MONOPOLAR DEPRESSION: Chronic | ICD-10-CM

## 2024-03-25 RX ORDER — ESCITALOPRAM OXALATE 10 MG/1
TABLET ORAL
Qty: 135 TABLET | Refills: 0 | Status: SHIPPED | OUTPATIENT
Start: 2024-03-25

## 2024-06-10 ENCOUNTER — OFFICE VISIT (OUTPATIENT)
Dept: INTERNAL MEDICINE | Facility: CLINIC | Age: 89
End: 2024-06-10
Payer: MEDICARE

## 2024-06-10 VITALS
HEART RATE: 92 BPM | TEMPERATURE: 97.1 F | WEIGHT: 132 LBS | DIASTOLIC BLOOD PRESSURE: 84 MMHG | OXYGEN SATURATION: 100 % | SYSTOLIC BLOOD PRESSURE: 138 MMHG | BODY MASS INDEX: 24.29 KG/M2 | HEIGHT: 62 IN

## 2024-06-10 DIAGNOSIS — R30.0 DYSURIA: Primary | ICD-10-CM

## 2024-06-10 DIAGNOSIS — N30.00 ACUTE CYSTITIS WITHOUT HEMATURIA: ICD-10-CM

## 2024-06-10 DIAGNOSIS — R35.0 FREQUENCY OF URINATION: ICD-10-CM

## 2024-06-10 DIAGNOSIS — K64.9 HEMORRHOIDS, UNSPECIFIED HEMORRHOID TYPE: ICD-10-CM

## 2024-06-10 LAB
BILIRUB BLD-MCNC: NEGATIVE MG/DL
CLARITY, POC: CLEAR
COLOR UR: YELLOW
EXPIRATION DATE: ABNORMAL
GLUCOSE UR STRIP-MCNC: NEGATIVE MG/DL
KETONES UR QL: NEGATIVE
LEUKOCYTE EST, POC: ABNORMAL
Lab: ABNORMAL
NITRITE UR-MCNC: NEGATIVE MG/ML
PH UR: 5.5 [PH] (ref 5–8)
PROT UR STRIP-MCNC: NEGATIVE MG/DL
RBC # UR STRIP: NEGATIVE /UL
SP GR UR: 1.01 (ref 1–1.03)
UROBILINOGEN UR QL: ABNORMAL

## 2024-06-10 PROCEDURE — 99214 OFFICE O/P EST MOD 30 MIN: CPT

## 2024-06-10 PROCEDURE — 1160F RVW MEDS BY RX/DR IN RCRD: CPT

## 2024-06-10 PROCEDURE — 1159F MED LIST DOCD IN RCRD: CPT

## 2024-06-10 PROCEDURE — 81003 URINALYSIS AUTO W/O SCOPE: CPT

## 2024-06-10 PROCEDURE — 1126F AMNT PAIN NOTED NONE PRSNT: CPT

## 2024-06-10 RX ORDER — HYDROCORTISONE ACETATE 25 MG/1
25 SUPPOSITORY RECTAL 2 TIMES DAILY
Qty: 14 SUPPOSITORY | Refills: 0 | Status: SHIPPED | OUTPATIENT
Start: 2024-06-10 | End: 2024-06-10

## 2024-06-10 RX ORDER — HYDROCORTISONE ACETATE 25 MG/1
25 SUPPOSITORY RECTAL 2 TIMES DAILY
Qty: 14 SUPPOSITORY | Refills: 0 | Status: SHIPPED | OUTPATIENT
Start: 2024-06-10

## 2024-06-10 RX ORDER — CEFDINIR 300 MG/1
300 CAPSULE ORAL 2 TIMES DAILY
Qty: 10 CAPSULE | Refills: 0 | Status: CANCELLED | OUTPATIENT
Start: 2024-06-10

## 2024-06-10 NOTE — PROGRESS NOTES
"Chief Complaint  Urinary Frequency (Burning x2 weeks ) and Hemorrhoids    Subjective        Kajal JV Severino presents to [facility]  History of Present Illness  Ms Severino is a patient of HAILY Kingston. She is here with her daughter today as added historian. She is having some burning with Urination and frequency. She has chronic back pain and denies spasms. She denies malodorous urine. She is present with her daughter today as added historian. She was unable to leave a urine sample prior to visit she is going to drink water and try to leave a sample today with lab.    Hemorrhoids:   She can feel them when she wipes. She is using suppositories and creams. She denies blood when she wipes. She has not had a decent bowel movement in three days. When she does got to the bathroom is a soft movement.     Objective   Vital Signs:  /84 (BP Location: Right arm, Patient Position: Sitting)   Pulse 92   Temp 97.1 °F (36.2 °C) (Temporal)   Ht 157.5 cm (62\")   Wt 59.9 kg (132 lb)   SpO2 100%   BMI 24.14 kg/m²   Estimated body mass index is 24.14 kg/m² as calculated from the following:    Height as of this encounter: 157.5 cm (62\").    Weight as of this encounter: 59.9 kg (132 lb).       BMI is within normal parameters. No other follow-up for BMI required.      Physical Exam  Vitals reviewed.   Constitutional:       Appearance: Normal appearance.   HENT:      Head: Normocephalic.      Right Ear: Hearing normal.      Left Ear: Hearing normal.      Nose: Nose normal.      Mouth/Throat:      Lips: Pink.      Mouth: Mucous membranes are moist.   Eyes:      General: Lids are normal.   Cardiovascular:      Rate and Rhythm: Normal rate and regular rhythm.      Pulses: Normal pulses.      Heart sounds: Normal heart sounds.   Pulmonary:      Effort: Pulmonary effort is normal.      Breath sounds: Normal breath sounds.   Abdominal:      General: Abdomen is flat. Bowel sounds are normal.      " Palpations: Abdomen is soft.      Tenderness: There is no abdominal tenderness. There is no right CVA tenderness, left CVA tenderness, guarding or rebound.   Musculoskeletal:      Right lower leg: No edema.      Left lower leg: No edema.   Lymphadenopathy:      Cervical: No cervical adenopathy.   Skin:     General: Skin is warm and dry.      Capillary Refill: Capillary refill takes less than 2 seconds.   Neurological:      General: No focal deficit present.      Mental Status: She is alert. Mental status is at baseline.      Motor: Weakness present.      Gait: Gait abnormal.      Comments: Patient shuffles when walking.    Psychiatric:         Mood and Affect: Mood normal.         Behavior: Behavior normal. Behavior is cooperative.         Cognition and Memory: She exhibits impaired recent memory.        Result Review :    The following data was reviewed by: HAILY Hatch on 06/10/2024:  UA          8/8/2023    11:56 6/10/2024    11:55   Urinalysis   Squamous Epithelial Cells, UA 3-6     Specific Gravity, UA 1.010     Ketones, UA Negative  Negative    Blood, UA Negative     Leukocytes, UA Small (1+)  Small (1+)    Nitrite, UA Positive     RBC, UA None Seen     WBC, UA 3-5     Bacteria, UA 4+                    Assessment and Plan     Diagnoses and all orders for this visit:    1. Dysuria (Primary)  -     POCT urinalysis dipstick, automated  -     Urine Culture - Urine, Urine, Clean Catch    2. Acute cystitis without hematuria  -     Urine Culture - Urine, Urine, Clean Catch    3. Frequency of urination  -     Urine Culture - Urine, Urine, Clean Catch    4. Hemorrhoids, unspecified hemorrhoid type    Other orders  -     Discontinue: hydrocortisone (ANUSOL-HC) 25 MG suppository; Insert 1 suppository into the rectum 2 (Two) Times a Day for 7 days.  Dispense: 14 suppository; Refill: 0  -     hydrocortisone (ANUSOL-HC) 25 MG suppository; Insert 1 suppository into the rectum 2 (Two) Times a Day.  Dispense: 14  suppository; Refill: 0     Please leave urine sample today. We will call you with the results of the Urine culture and if a antibiotic is needed.     You have been diagnosed with a urinary tract infection (UTI). An antibiotic has been prescribed for you that needs to be taken until gone, even if you feel better prior to completing the medication. It is recommended that you take a probiotic AFTER completing your antibiotic course for about 30 days; probiotics are available over the counter in pill form and can be found in certain yogurt brands. Increase you intake of water; you may also drink low sugar cranberry juice. Avoid caffeine as this tends to irritate the bladder wall.     If culture done on urine and started on antibiotic, you will be notified if bacteria is not sensitive to antibiotic and needs to be changed.     You may take over the counter AZO for no more than 3 days for bladder pain; AZO will turn you urine very orange or red. Use Motrin or Tylenol for fever/pain if no contradictions .   For recurrent infections it is best to avoid bathing in a tub, always wipe from front to back, urinate before and after sexual intercourse, avoid tight fitting pants, and wear cotton underwear.  If you develop fever, nausea, vomiting, flank pain notify your provider.     Thank you for allowing us to care for you,  HAILY Hatch           Follow Up     Return if symptoms worsen or fail to improve.  Patient was given instructions and counseling regarding her condition or for health maintenance advice. Please see specific information pulled into the AVS if appropriate.

## 2024-06-12 ENCOUNTER — TELEPHONE (OUTPATIENT)
Dept: INTERNAL MEDICINE | Facility: CLINIC | Age: 89
End: 2024-06-12
Payer: MEDICARE

## 2024-06-12 LAB
BACTERIA UR CULT: ABNORMAL

## 2024-06-14 RX ORDER — AMPICILLIN 500 MG/1
500 CAPSULE ORAL 2 TIMES DAILY
Qty: 14 CAPSULE | Refills: 0 | Status: SHIPPED | OUTPATIENT
Start: 2024-06-14 | End: 2024-06-21

## 2024-06-20 ENCOUNTER — OFFICE VISIT (OUTPATIENT)
Dept: INTERNAL MEDICINE | Facility: CLINIC | Age: 89
End: 2024-06-20
Payer: MEDICARE

## 2024-06-20 VITALS
TEMPERATURE: 97.6 F | DIASTOLIC BLOOD PRESSURE: 86 MMHG | HEART RATE: 76 BPM | BODY MASS INDEX: 24.18 KG/M2 | SYSTOLIC BLOOD PRESSURE: 134 MMHG | WEIGHT: 132.2 LBS | OXYGEN SATURATION: 97 %

## 2024-06-20 DIAGNOSIS — J30.2 SEASONAL ALLERGIC RHINITIS, UNSPECIFIED TRIGGER: Chronic | ICD-10-CM

## 2024-06-20 DIAGNOSIS — I10 PRIMARY HYPERTENSION: Chronic | ICD-10-CM

## 2024-06-20 DIAGNOSIS — M99.53 INTERVERTEBRAL DISC STENOSIS OF NEURAL CANAL OF LUMBAR REGION: Chronic | ICD-10-CM

## 2024-06-20 DIAGNOSIS — E55.9 VITAMIN D DEFICIENCY: Primary | Chronic | ICD-10-CM

## 2024-06-20 DIAGNOSIS — E03.9 ACQUIRED HYPOTHYROIDISM: Chronic | ICD-10-CM

## 2024-06-20 PROBLEM — Z79.899 CONTROLLED SUBSTANCE AGREEMENT SIGNED: Status: ACTIVE | Noted: 2024-06-20

## 2024-06-20 LAB
25(OH)D3+25(OH)D2 SERPL-MCNC: 58.2 NG/ML (ref 30–100)
ALBUMIN SERPL-MCNC: 4 G/DL (ref 3.5–5.2)
ALBUMIN/GLOB SERPL: 1.8 G/DL
ALP SERPL-CCNC: 71 U/L (ref 39–117)
ALT SERPL-CCNC: 11 U/L (ref 1–33)
AST SERPL-CCNC: 16 U/L (ref 1–32)
BILIRUB SERPL-MCNC: 0.4 MG/DL (ref 0–1.2)
BUN SERPL-MCNC: 25 MG/DL (ref 8–23)
BUN/CREAT SERPL: 35.7 (ref 7–25)
CALCIUM SERPL-MCNC: 9.1 MG/DL (ref 8.6–10.5)
CHLORIDE SERPL-SCNC: 108 MMOL/L (ref 98–107)
CHOLEST SERPL-MCNC: 155 MG/DL (ref 0–200)
CO2 SERPL-SCNC: 26.8 MMOL/L (ref 22–29)
CREAT SERPL-MCNC: 0.7 MG/DL (ref 0.57–1)
EGFRCR SERPLBLD CKD-EPI 2021: 83.3 ML/MIN/1.73
ERYTHROCYTE [DISTWIDTH] IN BLOOD BY AUTOMATED COUNT: 12 % (ref 12.3–15.4)
GLOBULIN SER CALC-MCNC: 2.2 GM/DL
GLUCOSE SERPL-MCNC: 73 MG/DL (ref 65–99)
HCT VFR BLD AUTO: 39.1 % (ref 34–46.6)
HDLC SERPL-MCNC: 50 MG/DL (ref 40–60)
HGB BLD-MCNC: 12.7 G/DL (ref 12–15.9)
LDLC SERPL CALC-MCNC: 79 MG/DL (ref 0–100)
MCH RBC QN AUTO: 31.1 PG (ref 26.6–33)
MCHC RBC AUTO-ENTMCNC: 32.5 G/DL (ref 31.5–35.7)
MCV RBC AUTO: 95.6 FL (ref 79–97)
PLATELET # BLD AUTO: 211 10*3/MM3 (ref 140–450)
POTASSIUM SERPL-SCNC: 4.2 MMOL/L (ref 3.5–5.2)
PROT SERPL-MCNC: 6.2 G/DL (ref 6–8.5)
RBC # BLD AUTO: 4.09 10*6/MM3 (ref 3.77–5.28)
SODIUM SERPL-SCNC: 146 MMOL/L (ref 136–145)
TRIGL SERPL-MCNC: 150 MG/DL (ref 0–150)
TSH SERPL DL<=0.005 MIU/L-ACNC: 0.42 UIU/ML (ref 0.27–4.2)
VLDLC SERPL CALC-MCNC: 26 MG/DL (ref 5–40)
WBC # BLD AUTO: 5.85 10*3/MM3 (ref 3.4–10.8)

## 2024-06-20 PROCEDURE — 1159F MED LIST DOCD IN RCRD: CPT | Performed by: NURSE PRACTITIONER

## 2024-06-20 PROCEDURE — 99214 OFFICE O/P EST MOD 30 MIN: CPT | Performed by: NURSE PRACTITIONER

## 2024-06-20 PROCEDURE — 1125F AMNT PAIN NOTED PAIN PRSNT: CPT | Performed by: NURSE PRACTITIONER

## 2024-06-20 PROCEDURE — 1160F RVW MEDS BY RX/DR IN RCRD: CPT | Performed by: NURSE PRACTITIONER

## 2024-06-20 PROCEDURE — G2211 COMPLEX E/M VISIT ADD ON: HCPCS | Performed by: NURSE PRACTITIONER

## 2024-06-22 NOTE — PROGRESS NOTES
"Chief Complaint  Hypertension (4 mos f/u) and Back Pain (Had to skip epidural due to abx)  Subjective        Kajal Severino presents to [facility]  History of Present Illness  History of Present Illness  The patient presents for evaluation of multiple medical concerns. She is accompanied by her son.    The patient was scheduled for an epidural injection on 07/18/2024 which was cancelled due to a bladder infection. She has rescheduled her appointment for July 18. She notes a significant amount of pain with decreased mobility since then.     The patient continues to experience discomfort from hemorrhoids, albeit not significantly bothersome. She denies experiencing constipation or diarrhea. She has been utilizing Preparation H for treatment, which she believes is improving her symptoms. She was given Anusol supp but the cost was prohibitive.    The patient is currently on a regimen of 125 mcg of thyroid medication, with Synthroid 112 mcg on Friday, Saturday, and Sunday. She reports no symptoms related to her thyroid medication.    The patient experienced a headache this morning, accompanied by a runny nose. She denies experiencing blurred vision or dizziness. She reports mild shortness of breath, which she attributes to the weather.    Objective   Vital Signs:  /86 (BP Location: Left arm, Patient Position: Sitting, Cuff Size: Adult)   Pulse 76   Temp 97.6 °F (36.4 °C)   Wt 60 kg (132 lb 3.2 oz)   SpO2 97%   BMI 24.18 kg/m²   Estimated body mass index is 24.18 kg/m² as calculated from the following:    Height as of 6/10/24: 157.5 cm (62\").    Weight as of this encounter: 60 kg (132 lb 3.2 oz).    BMI is within normal parameters. No other follow-up for BMI required.      Physical Exam  Constitutional:       Appearance: She is well-developed. She is not ill-appearing.   HENT:      Head: Normocephalic.      Right Ear: Hearing, tympanic membrane and external ear normal.      Left " Ear: Hearing, tympanic membrane and external ear normal.      Nose: Nose normal. No nasal deformity, mucosal edema or rhinorrhea.      Right Sinus: No maxillary sinus tenderness or frontal sinus tenderness.      Left Sinus: No maxillary sinus tenderness or frontal sinus tenderness.      Mouth/Throat:      Dentition: Normal dentition.      Pharynx: Posterior oropharyngeal erythema present.   Eyes:      General: Lids are normal.         Right eye: No discharge.         Left eye: No discharge.      Conjunctiva/sclera: Conjunctivae normal.      Right eye: No exudate.     Left eye: No exudate.  Neck:      Thyroid: No thyroid mass or thyromegaly.      Vascular: No carotid bruit.      Trachea: Trachea normal.   Cardiovascular:      Rate and Rhythm: Regular rhythm.      Pulses: Normal pulses.      Heart sounds: Normal heart sounds. No murmur heard.  Pulmonary:      Effort: No respiratory distress.      Breath sounds: Normal breath sounds. No decreased breath sounds, wheezing, rhonchi or rales.   Abdominal:      General: Bowel sounds are normal.      Palpations: Abdomen is soft.      Tenderness: There is no abdominal tenderness.   Musculoskeletal:      Cervical back: Normal range of motion. No edema.   Lymphadenopathy:      Head:      Right side of head: No submental, submandibular, tonsillar, preauricular, posterior auricular or occipital adenopathy.      Left side of head: No submental, submandibular, tonsillar, preauricular, posterior auricular or occipital adenopathy.   Skin:     General: Skin is warm and dry.      Nails: There is no clubbing.   Neurological:      Mental Status: She is alert.   Psychiatric:         Behavior: Behavior is cooperative.        Physical Exam  The back of the patient's throat is red due to postnasal drainage.    Result Review :  The following data was reviewed by: HAILY Pisano on 06/20/2024:  Common labs          10/1/2023    03:35 2/13/2024    11:11 6/20/2024    10:52   Common Labs    Glucose 85  84  73    BUN 17  24  25    Creatinine 0.48  0.79  0.70    Sodium 141  142  146    Potassium 3.6  4.0  4.2    Chloride 106  104  108    Calcium 9.2  9.8  9.1    Total Protein  6.6  6.2    Albumin  4.2  4.0    Total Bilirubin  0.4  0.4    Alkaline Phosphatase  74  71    AST (SGOT)  18  16    ALT (SGPT)  12  11    WBC 7.27  5.64  5.85    Hemoglobin 12.4  12.3  12.7    Hematocrit 38.4  37.8  39.1    Platelets 217  194  211    Total Cholesterol   155    Triglycerides   150    HDL Cholesterol   50    LDL Cholesterol    79      Data reviewed : Radiologic studies DEXA 10/19/23      Results  Imaging  Bone density scan showed osteopenia in the right hip.             Assessment and Plan   Diagnoses and all orders for this visit:    1. Vitamin D deficiency (Primary)  Assessment & Plan:  She is currently taking Vitamin D daily, recheck level.    Orders:  -     Vitamin D,25-Hydroxy    2. Acquired hypothyroidism  Assessment & Plan:  She is on Synthroid 125 mcg daily x5 days and Synthroid 112 mcg the other days. Recheck TSH.    Orders:  -     TSH    3. Seasonal allergic rhinitis, unspecified trigger  Assessment & Plan:  She is currently managed on Flonase NS and Zyrtec daily which has been helpful, notes increased drainage over the past few weeks.      4. Intervertebral disc stenosis of neural canal of lumbar region  Assessment & Plan:  She notes increased pain with decreased mobility, has rescheduled lumbar epidural for better control of symptoms.      5. Primary hypertension  Assessment & Plan:  Hypertension is stable and controlled  Continue current treatment regimen.  Blood pressure will be reassessed in 6 months.  Continue losartan along with a low sodium diet.    Orders:  -     CBC (No Diff)  -     Comprehensive Metabolic Panel  -     Lipid Panel      Assessment & Plan           Follow Up   Return in about 6 months (around 12/20/2024) for wellness.  Patient was given instructions and counseling regarding her  condition or for health maintenance advice. Please see specific information pulled into the AVS if appropriate.     Patient or patient representative verbalized consent for the use of Ambient Listening during the visit with  HAILY Pisano for chart documentation. 6/22/2024  11:49 EDT

## 2024-06-22 NOTE — ASSESSMENT & PLAN NOTE
She is currently managed on Flonase NS and Zyrtec daily which has been helpful, notes increased drainage over the past few weeks.

## 2024-06-22 NOTE — ASSESSMENT & PLAN NOTE
She notes increased pain with decreased mobility, has rescheduled lumbar epidural for better control of symptoms.

## 2024-07-18 ENCOUNTER — ANESTHESIA EVENT (OUTPATIENT)
Dept: PAIN MEDICINE | Facility: HOSPITAL | Age: 89
End: 2024-07-18
Payer: MEDICARE

## 2024-07-18 ENCOUNTER — HOSPITAL ENCOUNTER (OUTPATIENT)
Dept: GENERAL RADIOLOGY | Facility: HOSPITAL | Age: 89
Discharge: HOME | End: 2024-07-18
Payer: MEDICARE

## 2024-07-18 ENCOUNTER — HOSPITAL ENCOUNTER (OUTPATIENT)
Dept: PAIN MEDICINE | Facility: HOSPITAL | Age: 89
Discharge: HOME | End: 2024-07-18
Payer: MEDICARE

## 2024-07-18 ENCOUNTER — ANESTHESIA (OUTPATIENT)
Dept: PAIN MEDICINE | Facility: HOSPITAL | Age: 89
End: 2024-07-18
Payer: MEDICARE

## 2024-07-18 VITALS
TEMPERATURE: 97.1 F | DIASTOLIC BLOOD PRESSURE: 83 MMHG | RESPIRATION RATE: 16 BRPM | OXYGEN SATURATION: 95 % | SYSTOLIC BLOOD PRESSURE: 133 MMHG | HEART RATE: 60 BPM

## 2024-07-18 DIAGNOSIS — M54.16 LUMBAR RADICULOPATHY: Primary | ICD-10-CM

## 2024-07-18 DIAGNOSIS — R52 PAIN: ICD-10-CM

## 2024-07-18 PROCEDURE — 25010000002 BUPIVACAINE (PF) 0.25 % SOLUTION: Performed by: ANESTHESIOLOGY

## 2024-07-18 PROCEDURE — 25010000002 DEXAMETHASONE SODIUM PHOSPHATE 10 MG/ML SOLUTION: Performed by: ANESTHESIOLOGY

## 2024-07-18 PROCEDURE — 77003 FLUOROGUIDE FOR SPINE INJECT: CPT

## 2024-07-18 PROCEDURE — 25510000001 IOPAMIDOL 41 % SOLUTION: Performed by: ANESTHESIOLOGY

## 2024-07-18 RX ORDER — FENTANYL CITRATE 50 UG/ML
50 INJECTION, SOLUTION INTRAMUSCULAR; INTRAVENOUS ONCE
Status: DISCONTINUED | OUTPATIENT
Start: 2024-07-18 | End: 2024-07-19 | Stop reason: HOSPADM

## 2024-07-18 RX ORDER — BUPIVACAINE HYDROCHLORIDE 2.5 MG/ML
10 INJECTION, SOLUTION EPIDURAL; INFILTRATION; INTRACAUDAL ONCE
Status: COMPLETED | OUTPATIENT
Start: 2024-07-18 | End: 2024-07-18

## 2024-07-18 RX ORDER — MIDAZOLAM HYDROCHLORIDE 1 MG/ML
2 INJECTION INTRAMUSCULAR; INTRAVENOUS ONCE
Status: DISCONTINUED | OUTPATIENT
Start: 2024-07-18 | End: 2024-07-19 | Stop reason: HOSPADM

## 2024-07-18 RX ORDER — IOPAMIDOL 408 MG/ML
2 INJECTION, SOLUTION INTRATHECAL
Status: COMPLETED | OUTPATIENT
Start: 2024-07-18 | End: 2024-07-18

## 2024-07-18 RX ORDER — DEXAMETHASONE SODIUM PHOSPHATE 10 MG/ML
10 INJECTION, SOLUTION INTRAMUSCULAR; INTRAVENOUS ONCE
Status: COMPLETED | OUTPATIENT
Start: 2024-07-18 | End: 2024-07-18

## 2024-07-18 RX ORDER — LIDOCAINE HYDROCHLORIDE 10 MG/ML
1 INJECTION, SOLUTION INFILTRATION; PERINEURAL ONCE
Status: COMPLETED | OUTPATIENT
Start: 2024-07-18 | End: 2024-07-18

## 2024-07-18 RX ADMIN — BUPIVACAINE HYDROCHLORIDE 10 ML: 2.5 INJECTION, SOLUTION EPIDURAL; INFILTRATION; INTRACAUDAL; PERINEURAL at 10:50

## 2024-07-18 RX ADMIN — LIDOCAINE HYDROCHLORIDE 5 ML: 10 INJECTION, SOLUTION EPIDURAL; INFILTRATION; INTRACAUDAL; PERINEURAL at 10:50

## 2024-07-18 RX ADMIN — IOPAMIDOL 10 ML: 408 INJECTION, SOLUTION INTRATHECAL at 10:50

## 2024-07-18 RX ADMIN — DEXAMETHASONE SODIUM PHOSPHATE 10 MG: 10 INJECTION, SOLUTION INTRAMUSCULAR; INTRAVENOUS at 10:50

## 2024-07-18 NOTE — DISCHARGE INSTRUCTIONS

## 2024-07-18 NOTE — ANESTHESIA PROCEDURE NOTES
Transforaminal epidural, right, lumbar 4/5 and lumbar 5/ sacral 1    Pre-sedation assessment completed: 7/18/2024 10:46 AM    Patient reassessed immediately prior to procedure    Patient location during procedure: pain clinic  Start Time: 7/18/2024 10:46 AM  Stop Time: 7/18/2024 11:01 AM  Indication:at surgeon's request and procedure for pain  Performed By  Anesthesiologist: Theodore Lo MD  Preanesthetic Checklist  Completed: patient identified, IV checked, site marked, risks and benefits discussed, surgical consent, monitors and equipment checked, pre-op evaluation and timeout performed  Additional Notes  Dx : Post-Op Diagnosis Codes:     * Lumbar radiculopathy [M54.16]     * 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  Needle Type:Other (blunt tip epimed needle 22 G)  Needle Gauge:22 G  Aspiration:negative  Test Dose:negative  Medications:  Isovue:2mL  Comments:Dexamethasone 10 mg  Bupivacaine 0.25%- 2cc  Post Assessment:  Pt Tolerance:patient tolerated the procedure well with no apparent complications  Complications:no

## 2024-07-18 NOTE — H&P
INTERVAL HISTORY:    The patient returns for another transforaminal epidural, right, lumbar 4/5 and lumbar 5/ sacral 1  today.  They have received 95% improvement since their last injection with a pain level of 10/10 at its worst recently.    Conservative measures tried in the interim. Daily activities are still affected by the pain.    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  Physical therapy exercises at home as prescribed by physical therapy or from the pain clinic handout (given to the patient).  Continuation of these exercises every day, or multiple times per week, even when the patient has good pain relief, was stressed to the patient as a preventative measure to decrease the frequency and severity of future pain episodes.  2.  Continue pain medicines as already prescribed.  If patient not currently taking any, it is recommended to begin Acetaminophen 1000 mg po q 8 hours.  If other medicines containing Acetaminophen are currently prescribed, maintain daily dose at 3000mg.    3.  If they can tolerate NSAIDS, it is recommended to take Ibuprofen 600 mg po q 6 hours for 7 days during pain exacerbations.   Alternatively, they may substitute an NSAID of their choice (e.g. Aleve)  4.  Heat and ice to the affected area as tolerated for pain control.  It was discussed that heating pads can  cause burns.  5.  Low impact exercise such as walking or water exercise was recommended to maintain overall health and aid in weight control.   6.  Follow up as needed for subsequent injections.  7.  Patient was counseled to abstain from tobacco products.    Time :  22  min

## 2024-07-22 DIAGNOSIS — R52 DIFFUSE PAIN: ICD-10-CM

## 2024-07-23 RX ORDER — GABAPENTIN 300 MG/1
300 CAPSULE ORAL NIGHTLY
Qty: 90 CAPSULE | Refills: 0 | Status: SHIPPED | OUTPATIENT
Start: 2024-07-23

## 2024-07-23 NOTE — TELEPHONE ENCOUNTER
I am covering for India Alanis APRN , who is off today.     Medication refilled for patient.  Gabapentin.   Generally filled every 90 days.   Leandro reviewed.

## 2024-08-15 DIAGNOSIS — I10 ESSENTIAL HYPERTENSION: ICD-10-CM

## 2024-08-15 RX ORDER — LOSARTAN POTASSIUM 100 MG/1
TABLET ORAL
Qty: 90 TABLET | Refills: 1 | Status: SHIPPED | OUTPATIENT
Start: 2024-08-15

## 2024-08-19 ENCOUNTER — OFFICE VISIT (OUTPATIENT)
Dept: INTERNAL MEDICINE | Facility: CLINIC | Age: 89
End: 2024-08-19
Payer: MEDICARE

## 2024-08-19 ENCOUNTER — HOSPITAL ENCOUNTER (OUTPATIENT)
Facility: HOSPITAL | Age: 89
Discharge: HOME | End: 2024-08-19
Payer: MEDICARE

## 2024-08-19 VITALS
OXYGEN SATURATION: 95 % | SYSTOLIC BLOOD PRESSURE: 136 MMHG | DIASTOLIC BLOOD PRESSURE: 78 MMHG | HEIGHT: 62 IN | HEART RATE: 71 BPM | TEMPERATURE: 96.9 F | WEIGHT: 132 LBS | BODY MASS INDEX: 24.29 KG/M2

## 2024-08-19 DIAGNOSIS — M79.604 RIGHT LEG PAIN: Primary | ICD-10-CM

## 2024-08-19 PROCEDURE — 99214 OFFICE O/P EST MOD 30 MIN: CPT

## 2024-08-19 PROCEDURE — 73590 X-RAY EXAM OF LOWER LEG: CPT

## 2024-08-19 PROCEDURE — 73560 X-RAY EXAM OF KNEE 1 OR 2: CPT

## 2024-08-19 PROCEDURE — 1125F AMNT PAIN NOTED PAIN PRSNT: CPT

## 2024-08-19 NOTE — PROGRESS NOTES
"Chief Complaint  Leg Pain (Swelling and discomfort x4 days, no injury )    Subjective        Kajal Severino presents to [facility]  History of Present Illness  Kajal Severino is a patient of Inida Alanis APRN and presents today with complaints of right pain in her leg, her daughter is here today with her as historian. She is having painn for the past 4 days, pt has dementia and does not remember hitting it on anything, trauma or falling. Her daughter states she would tell them if she hit her leg. Her daughter does not notice any vein protrusion, color changes or her rubbing her leg in a specific spot. She states it is hurting when she is walking sitting and is a shooting pain. Her gait is not as good as it usually is.       Objective   Vital Signs:  /78 (BP Location: Left arm, Patient Position: Sitting, Cuff Size: Adult)   Pulse 71   Temp 96.9 °F (36.1 °C) (Temporal)   Ht 157.5 cm (62.01\")   Wt 59.9 kg (132 lb)   SpO2 95%   BMI 24.14 kg/m²   Estimated body mass index is 24.14 kg/m² as calculated from the following:    Height as of this encounter: 157.5 cm (62.01\").    Weight as of this encounter: 59.9 kg (132 lb).    BMI is within normal parameters. No other follow-up for BMI required.      Physical Exam  Vitals reviewed.   Constitutional:       General: She is awake.      Appearance: Normal appearance.   HENT:      Head: Normocephalic.      Right Ear: Hearing normal.      Left Ear: Hearing normal.      Nose: Nose normal.      Mouth/Throat:      Lips: Pink.      Mouth: Mucous membranes are moist.   Eyes:      General: Lids are normal.   Cardiovascular:      Rate and Rhythm: Normal rate and regular rhythm.      Pulses: Normal pulses.      Heart sounds: Normal heart sounds.   Pulmonary:      Effort: Pulmonary effort is normal.      Breath sounds: Normal breath sounds.   Abdominal:      General: Abdomen is flat. Bowel sounds are normal.      Palpations: Abdomen is soft. "   Musculoskeletal:      Right knee: Swelling present. No deformity, ecchymosis or lacerations. Decreased range of motion.      Left knee: Normal.      Right lower leg: No swelling. No edema.      Left lower leg: No edema.   Skin:     General: Skin is warm and dry.      Capillary Refill: Capillary refill takes less than 2 seconds.   Neurological:      General: No focal deficit present.      Mental Status: She is alert. Mental status is at baseline.      Motor: Weakness present.      Gait: Gait abnormal.   Psychiatric:         Attention and Perception: Attention and perception normal.         Mood and Affect: Mood and affect normal.         Speech: Speech normal.         Behavior: Behavior normal. Behavior is cooperative.         Thought Content: Thought content normal.         Cognition and Memory: Cognition is impaired. Memory is impaired. She exhibits impaired recent memory.        Result Review :                Assessment and Plan   Diagnoses and all orders for this visit:    1. Right leg pain (Primary)  -     Cancel: XR Tibia Fibula 2 View Left  -     XR Knee 1 or 2 View Right  -     XR Tibia Fibula 2 View Right    A referral to another provider has been placed, please give the office staff time to place referral to appropriate provider.  If you have not heard anything within 2 weeks please contact my office to follow-up on status of the referral.    Please review added information under the Patient Instructions portion of your print out.    Thank you for allowing us to care for you,  HAILY Hatch           Follow Up   No follow-ups on file.  Patient was given instructions and counseling regarding her condition or for health maintenance advice. Please see specific information pulled into the AVS if appropriate.

## 2024-08-20 DIAGNOSIS — M25.561 CHRONIC PAIN OF RIGHT KNEE: Primary | ICD-10-CM

## 2024-08-20 DIAGNOSIS — G89.29 CHRONIC PAIN OF RIGHT KNEE: Primary | ICD-10-CM

## 2024-08-20 NOTE — PROGRESS NOTES
Ms. Marissa,   I have reviewed your X Ray of your leg. There is some degenerative changes, calcium deposits, soft tissue swelling. I can place a referral to Orthopedics for them to evaluate and treat if you would like or she can take OTC medication for the swelling and discomfort and revisit with India Alanis at your next appointment.   Please let me know how you would like to move forward.   Thank you for allowing us to care for you,  HAILY Hatch

## 2024-08-27 ENCOUNTER — OFFICE VISIT (OUTPATIENT)
Dept: ORTHOPEDIC SURGERY | Facility: CLINIC | Age: 89
End: 2024-08-27
Payer: MEDICARE

## 2024-08-27 VITALS — TEMPERATURE: 97.7 F | WEIGHT: 133 LBS | HEIGHT: 62 IN | BODY MASS INDEX: 24.48 KG/M2

## 2024-08-27 DIAGNOSIS — M17.11 PRIMARY OSTEOARTHRITIS OF RIGHT KNEE: Primary | ICD-10-CM

## 2024-08-27 RX ADMIN — METHYLPREDNISOLONE ACETATE 1 ML: 80 INJECTION, SUSPENSION INTRA-ARTICULAR; INTRALESIONAL; INTRAMUSCULAR; SOFT TISSUE at 14:50

## 2024-08-27 RX ADMIN — LIDOCAINE HYDROCHLORIDE 2 ML: 10 INJECTION, SOLUTION EPIDURAL; INFILTRATION; INTRACAUDAL; PERINEURAL at 14:50

## 2024-08-27 NOTE — PROGRESS NOTES
New Knee      Patient: Kajal Severino        YOB: 1935    Medical Record Number: [id number]        Chief Complaints: Right knee pain      History of Present Illness: This is a 89-year-old female who is here with family member who presents complaining of a several week history of right knee pain she states she has chronic back issues and gets epidurals the last was about a month ago that did resolve some of her lower extremity pain but she still has knee pain and knee pain now that goes down into the shin pain is anterior and medial no new history injury change in activity that she can recall she does have some swelling        Allergies: No Known Allergies    Medications:   Home Medications:  Current Outpatient Medications on File Prior to Visit   Medication Sig    acetaminophen (TYLENOL) 650 MG 8 hr tablet Take 1 tablet by mouth Every 8 (Eight) Hours As Needed for Mild Pain.    escitalopram (LEXAPRO) 10 MG tablet TAKE 1 AND 1/2 TABLETS BY MOUTH DAILY    fluticasone (FLONASE) 50 MCG/ACT nasal spray SHAKE LIQUID AND USE 2 SPRAYS IN EACH NOSTRIL DAILY    gabapentin (NEURONTIN) 300 MG capsule TAKE 1 CAPSULE BY MOUTH EVERY NIGHT    hydrocortisone (ANUSOL-HC) 25 MG suppository Insert 1 suppository into the rectum 2 (Two) Times a Day.    ibuprofen (ADVIL,MOTRIN) 200 MG tablet Take 1 tablet by mouth Every 6 (Six) Hours As Needed for Mild Pain.    levothyroxine (Synthroid) 112 MCG tablet Take 1 tablet Friday-Sunday    levothyroxine (SYNTHROID, LEVOTHROID) 125 MCG tablet TAKE 1 TABLET BY MOUTH DAILY    loratadine (CLARITIN) 5 MG chewable tablet Chew 1 tablet Daily.    losartan (COZAAR) 100 MG tablet TAKE 1 TABLET BY MOUTH DAILY     No current facility-administered medications on file prior to visit.     Current Medications:  Scheduled Meds:  Continuous Infusions:No current facility-administered medications for this visit.    PRN Meds:.    Past Medical History:   Diagnosis Date    Allergic rhinitis     Ankle  sprain     Arthritis of back     Arthritis of neck     Back pain, lumbosacral     Depression     Erythrocytosis     Fibrocystic breast     Fracture of ankle     Fracture, foot     Frozen shoulder     H/O bone density study 2014    Headache     Hip arthrosis     History of anemia     History of chest pain     History of colonic polyps     History of prior pregnancies     x7    Hyperlipidemia     Hypertension     Hypothyroidism     Intervertebral disc stenosis of neural canal of lumbar region 2022    Knee swelling     Low back pain     Osteoarthritis     Osteoporosis     Periarthritis of shoulder     Peripheral neuropathy     Potassium deficiency     Shortness of breath         Past Surgical History:   Procedure Laterality Date    BREAST BIOPSY Bilateral 1980    x3     SECTION N/A     x1    COLONOSCOPY N/A 10/05/2012    + Diverticulosis and polyps    EPIDURAL BLOCK      PAP SMEAR N/A 11/15/2006    TRIGGER POINT INJECTION      Epidurals        Social History     Occupational History     Employer: RETIRED   Tobacco Use    Smoking status: Former     Current packs/day: 0.00     Average packs/day: 0.3 packs/day for 15.0 years (3.8 ttl pk-yrs)     Types: Cigarettes     Start date: 1980     Quit date: 1995     Years since quittin.6     Passive exposure: Past    Smokeless tobacco: Never   Vaping Use    Vaping status: Never Used   Substance and Sexual Activity    Alcohol use: No    Drug use: No    Sexual activity: Not Currently     Partners: Male      Social History     Social History Narrative    Not on file        Family History   Problem Relation Age of Onset    Heart disease Mother     Hypertension Mother     Diabetes Father     Heart disease Father     Hypertension Father     Breast cancer Sister     Leukemia Brother         CML    Breast cancer Daughter     Hypertension Daughter     Thyroid disease Daughter     Colon cancer Brother     Prostate cancer Brother     Prostate cancer Brother      Kidney cancer Brother     Hypertension Daughter     Hypertension Son              Review of Systems:     Review of Systems      Physical Exam: 89 y.o. female  General Appearance:    Alert, cooperative, in no acute distress                 There were no vitals filed for this visit.   Patient is alert and read ×3 no acute distress appears her above-listed at height weight and age.  Affect is normal respiratory rate is normal unlabored. Heart rate regular rate rhythm, sclera, dentition and hearing are normal for the purpose of this exam.        Ortho Exam  Physical exam of the right knee reveals no effusion, no erythema.  It mild loss of extension and full flexion  Patient has mild varus alignment.  They have mild tenderness to palpation about the medial compartment, no tenderness laterally..  The patient has a negative bounce home, negative Samantha and a stable ligamentous exam.  Quad tone is reasonable and symmetric.  There are no overlying skin changes no lymphedema no lymphadenopathy.  There is good hip range of motion which is full symmetric and asymptomatic and a normal ankle exam.   Large Joint Arthrocentesis: R knee  Date/Time: 8/27/2024 2:50 PM  Consent given by: patient  Site marked: site marked  Timeout: Immediately prior to procedure a time out was called to verify the correct patient, procedure, equipment, support staff and site/side marked as required   Supporting Documentation  Indications: pain   Procedure Details  Location: knee - R knee  Preparation: Patient was prepped and draped in the usual sterile fashion  Needle gauge: 21G.  Medications administered: 1 mL methylPREDNISolone acetate 80 MG/ML; 2 mL lidocaine PF 1% 1 %  Patient tolerance: patient tolerated the procedure well with no immediate complications               Radiology:   AP, Lateral and merchant views of the right knee  were /reviewed to evauateknee pain.  I reviewed these in epic she has severe medial compartment OA with narrowing  the joint space and osteophyte formation she is severe patellofemoral OA she has chondrocalcinosis medially and laterally  Imaging Results (Most Recent)       None          Assessment/Plan:    Right knee pain I think some of her issue is from her knee but I do think she still has a radicular component plan is to proceed with an injection of the knee as a diagnostic and therapeutic tool she understands that if this helps she could repeat this in 3 months

## 2024-08-28 RX ORDER — METHYLPREDNISOLONE ACETATE 80 MG/ML
1 INJECTION, SUSPENSION INTRA-ARTICULAR; INTRALESIONAL; INTRAMUSCULAR; SOFT TISSUE
Status: COMPLETED | OUTPATIENT
Start: 2024-08-27 | End: 2024-08-27

## 2024-08-28 RX ORDER — LIDOCAINE HYDROCHLORIDE 10 MG/ML
2 INJECTION, SOLUTION EPIDURAL; INFILTRATION; INTRACAUDAL; PERINEURAL
Status: COMPLETED | OUTPATIENT
Start: 2024-08-27 | End: 2024-08-27

## 2024-09-01 DIAGNOSIS — E03.9 ACQUIRED HYPOTHYROIDISM: ICD-10-CM

## 2024-09-02 DIAGNOSIS — E03.9 ACQUIRED HYPOTHYROIDISM: ICD-10-CM

## 2024-09-03 RX ORDER — LEVOTHYROXINE SODIUM 112 UG/1
TABLET ORAL
Qty: 30 TABLET | Refills: 5 | Status: SHIPPED | OUTPATIENT
Start: 2024-09-03

## 2024-09-03 RX ORDER — LEVOTHYROXINE SODIUM 112 UG/1
TABLET ORAL
Qty: 30 TABLET | Refills: 5 | OUTPATIENT
Start: 2024-09-03

## 2024-09-03 NOTE — PATIENT INSTRUCTIONS
Acute Knee Pain, Adult  Many things can cause knee pain. Sometimes, knee pain is sudden (acute) and may be caused by damage, swelling, or irritation of the muscles and tissues that support your knee.  The pain often goes away on its own with time and rest. If the pain does not go away, tests may be done to find out what is causing the pain.  Follow these instructions at home:  If you have a knee sleeve or brace:    Wear the knee sleeve or brace as told by your doctor. Take it off only as told by your doctor.  Loosen it if your toes:  Tingle.  Become numb.  Turn cold and blue.  Keep it clean.  If the knee sleeve or brace is not waterproof:  Do not let it get wet.  Cover it with a watertight covering when you take a bath or shower.  Activity  Rest your knee.  Do not do things that cause pain or make pain worse.  Avoid activities where both feet leave the ground at the same time (high-impact activities). Examples are running, jumping rope, and doing jumping jacks.  Work with a physical therapist to make a safe exercise program, as told by your doctor.  Managing pain, stiffness, and swelling    If told, put ice on the knee. To do this:  If you have a removable knee sleeve or brace, take it off as told by your doctor.  Put ice in a plastic bag.  Place a towel between your skin and the bag.  Leave the ice on for 20 minutes, 2-3 times a day.  Take off the ice if your skin turns bright red. This is very important. If you cannot feel pain, heat, or cold, you have a greater risk of damage to the area.  If told, use an elastic bandage to put pressure (compression) on your injured knee.  Raise your knee above the level of your heart while you are sitting or lying down.  Sleep with a pillow under your knee.  General instructions  Take over-the-counter and prescription medicines only as told by your doctor.  Do not smoke or use any products that contain nicotine or tobacco. If you need help quitting, ask your doctor.  If you are  overweight, work with your doctor and a food expert (dietitian) to set goals to lose weight. Being overweight can make your knee hurt more.  Watch for any changes in your symptoms.  Keep all follow-up visits.  Contact a doctor if:  The knee pain does not stop.  The knee pain changes or gets worse.  You have a fever along with knee pain.  Your knee is red or feels warm when you touch it.  Your knee gives out or locks up.  Get help right away if:  Your knee swells, and the swelling gets worse.  You cannot move your knee.  You have very bad knee pain that does not get better with pain medicine.  Summary  Many things can cause knee pain. The pain often goes away on its own with time and rest.  Your doctor may do tests to find out the cause of the pain.  Watch for any changes in your symptoms. Relieve your pain with rest, medicines, light activity, and use of ice.  Get help right away if you cannot move your knee or your knee pain is very bad.  This information is not intended to replace advice given to you by your health care provider. Make sure you discuss any questions you have with your health care provider.  Document Revised: 06/01/2021 Document Reviewed: 06/02/2021  Elsevier Patient Education © 2024 [employer]

## 2024-09-04 ENCOUNTER — OFFICE VISIT (OUTPATIENT)
Dept: ORTHOPEDIC SURGERY | Facility: CLINIC | Age: 89
End: 2024-09-04
Payer: MEDICARE

## 2024-09-04 VITALS — BODY MASS INDEX: 24.4 KG/M2 | HEIGHT: 62 IN | WEIGHT: 132.6 LBS | TEMPERATURE: 96.9 F

## 2024-09-04 DIAGNOSIS — M54.50 LUMBAR SPINE PAIN: ICD-10-CM

## 2024-09-04 DIAGNOSIS — M47.816 FACET ARTHRITIS OF LUMBAR REGION: Primary | ICD-10-CM

## 2024-09-04 PROCEDURE — 1160F RVW MEDS BY RX/DR IN RCRD: CPT | Performed by: NURSE PRACTITIONER

## 2024-09-04 PROCEDURE — 99213 OFFICE O/P EST LOW 20 MIN: CPT | Performed by: NURSE PRACTITIONER

## 2024-09-04 PROCEDURE — 1159F MED LIST DOCD IN RCRD: CPT | Performed by: NURSE PRACTITIONER

## 2024-09-04 NOTE — PROGRESS NOTES
Patient Name: Kajal Severino   YOB: 1935  Referring Primary Care Physician: India Alanis APRN      Chief Complaint:    Chief Complaint   Patient presents with    Lumbar Spine - Follow-up, Pain            HPI:  Kajal Severino is a 89 y.o. female who presents to [facility] for follow-up of low back pain.  Since last visit she has had lumbar epidural injections as well as transforaminal epidural injections through the hospital pain management.  At last visit we had updated lumbar MRI as she reported the most recent epidural injection prior to the November 2023 visit had not offered her the relief that she previously had gotten.  She wanted to try epidural injections at the hospital once again.  She does states she initially got benefit, but now states that the last injection after her no relief.  Today she is complaining mostly of lower lumbar axial pain.  She says the leg pain actually improved after getting an injection into her right knee recently.  She is accompanied by her daughter today.    PFSH:  See attached    ROS: As per HPI, otherwise negative    Objective:      89 y.o. female  Body mass index is 24.25 kg/m²., 60.1 kg (132 lb 9.6 oz), @HT@  Vitals:    09/04/24 1430   Temp: 96.9 °F (36.1 °C)     Pain Score    09/04/24 1430   PainSc:   8   PainLoc: Back            Spine Musculoskeletal Exam    Palpation    Thoracolumbar    Tenderness: present      Spinous process: lower lumbar    Strength    Thoracolumbar    Thoracolumbar motor exam is normal.       Sensory    Thoracolumbar    Thoracolumbar sensation is normal.        IMAGING:       No imaging in office today.  Lumbar [facility] at [facility] 11/18/2023 had previously been reviewed with the patient reveals multilevel lumbar spondylosis with severe facet arthropathy lower lumbar spine and right greater than left foraminal narrowing L5-S1 and moderate bilateral foraminal narrowing L4-5, no high-grade central stenosis at any  level.    Assessment:           Diagnoses and all orders for this visit:    1. Facet arthritis of lumbar region (Primary)  -     Ambulatory Referral to Pain Management Clinic    2. Lumbar spine pain  -     Epidural Block             Plan:    That her back pain is primarily axial in nature, she may benefit more from facet ablations.  The hospital does not offer these injections.  She is scheduled for another epidural in October.  She would like to complete that epidural but this time we will ask pain management to do an L5-S1 translaminar epidural.  Also advised her daughter to try lidocaine patches, ice and/or heat as needed.  Advised against utilizing heat with the lidocaine patches as this can blister the skin or cause burns.  She has been utilizing Tylenol and ibuprofen without difficulty.  Also encouraged her once again to remain active and keep up with her home exercise program previously learned at physical therapy.  Will also make a referral to [facility] pain management for consideration of LMBB/RFA.  She is not interested in surgical management and likely not a good candidate for surgery so we will plan on follow-up only as needed.      Return if symptoms worsen or fail to improve.    EMR Dragon/Transcription Disclaimer:   Much of this encounter note is an electronic transcription/translation of spoken language to printed text. The electronic translation of spoken language may permit erroneous, or at times, nonsensical words or phrases to be inadvertently transcribed; Although I have reviewed the note for such errors, some may still exist.  Red flags have been discussed at this or previous visits to include but not limited weakness in extremities, worsening pain that does not respond to conservative treatment and bowel or bladder dysfunction. These are reasons to present to ER and patient has been informed.    The diagnosis(es), natural history, pathophysiology and treatment for diagnosis(es) were  discussed. Opportunity given and questions answered. Biomechanics of pertinent body areas discussed.    EXERCISES:  Advice on benefits of, and types of regular/moderate exercise pertaining to diagnosis.  Continue HEP. For back or neck pain, recommend pilates and or yoga as tolerated. Generally it is best to start any new exercise under the guidance of a  or therapist.   MEDICATIONS:  When prescribe, the risks, benefits, warnings,side effects and alternatives of medications discussed. Advised against long term use of narcotics.   PAIN CONTROL:  Cold, heat, OTC lidocaine patches and/or ointment as needed. Avoid direct skin contact with ice. Ice 15-20 minutes 3-4 times daily as needed. For SI joint pain, recommend ice bath in water about 50 degrees for 5 consecutive days, add ice slowly to help with adjustment and may cover with warm towel or robe to help with cold tolerance. If using lidocaine, do not apply heat in conjunction as this can cause a burn.   MEDICAL RECORDS reviewed from other provider(s) for past and current medical history pertinent to this visit..

## 2024-09-09 DIAGNOSIS — E03.9 ACQUIRED HYPOTHYROIDISM: ICD-10-CM

## 2024-09-10 RX ORDER — LEVOTHYROXINE SODIUM 125 UG/1
TABLET ORAL
Qty: 90 TABLET | Refills: 1 | Status: SHIPPED | OUTPATIENT
Start: 2024-09-10

## 2024-10-22 ENCOUNTER — ANESTHESIA EVENT (OUTPATIENT)
Dept: PAIN MEDICINE | Facility: HOSPITAL | Age: 89
End: 2024-10-22
Payer: MEDICARE

## 2024-10-22 ENCOUNTER — ANESTHESIA (OUTPATIENT)
Dept: PAIN MEDICINE | Facility: HOSPITAL | Age: 89
End: 2024-10-22
Payer: MEDICARE

## 2024-10-22 ENCOUNTER — HOSPITAL ENCOUNTER (OUTPATIENT)
Dept: PAIN MEDICINE | Facility: HOSPITAL | Age: 89
Discharge: HOME | End: 2024-10-22
Payer: MEDICARE

## 2024-10-22 ENCOUNTER — HOSPITAL ENCOUNTER (OUTPATIENT)
Dept: GENERAL RADIOLOGY | Facility: HOSPITAL | Age: 89
Discharge: HOME | End: 2024-10-22
Payer: MEDICARE

## 2024-10-22 VITALS
RESPIRATION RATE: 16 BRPM | DIASTOLIC BLOOD PRESSURE: 78 MMHG | OXYGEN SATURATION: 95 % | TEMPERATURE: 97.3 F | SYSTOLIC BLOOD PRESSURE: 117 MMHG | HEART RATE: 67 BPM

## 2024-10-22 DIAGNOSIS — R52 PAIN: ICD-10-CM

## 2024-10-22 DIAGNOSIS — M54.16 LUMBAR RADICULOPATHY: ICD-10-CM

## 2024-10-22 PROCEDURE — 77003 FLUOROGUIDE FOR SPINE INJECT: CPT

## 2024-10-22 PROCEDURE — 25010000002 METHYLPREDNISOLONE PER 80 MG: Performed by: ANESTHESIOLOGY

## 2024-10-22 PROCEDURE — 25510000001 IOPAMIDOL 41 % SOLUTION: Performed by: ANESTHESIOLOGY

## 2024-10-22 RX ORDER — MIDAZOLAM HYDROCHLORIDE 1 MG/ML
1 INJECTION INTRAMUSCULAR; INTRAVENOUS ONCE AS NEEDED
Status: DISCONTINUED | OUTPATIENT
Start: 2024-10-22 | End: 2024-10-23 | Stop reason: HOSPADM

## 2024-10-22 RX ORDER — METHYLPREDNISOLONE ACETATE 80 MG/ML
80 INJECTION, SUSPENSION INTRA-ARTICULAR; INTRALESIONAL; INTRAMUSCULAR; SOFT TISSUE ONCE
Status: COMPLETED | OUTPATIENT
Start: 2024-10-22 | End: 2024-10-22

## 2024-10-22 RX ORDER — FENTANYL CITRATE 50 UG/ML
50 INJECTION, SOLUTION INTRAMUSCULAR; INTRAVENOUS ONCE
Status: DISCONTINUED | OUTPATIENT
Start: 2024-10-22 | End: 2024-10-23 | Stop reason: HOSPADM

## 2024-10-22 RX ORDER — IOPAMIDOL 408 MG/ML
10 INJECTION, SOLUTION INTRATHECAL
Status: COMPLETED | OUTPATIENT
Start: 2024-10-22 | End: 2024-10-22

## 2024-10-22 RX ORDER — LIDOCAINE HYDROCHLORIDE 10 MG/ML
1 INJECTION, SOLUTION INFILTRATION; PERINEURAL ONCE
Status: DISCONTINUED | OUTPATIENT
Start: 2024-10-22 | End: 2024-10-23 | Stop reason: HOSPADM

## 2024-10-22 RX ADMIN — METHYLPREDNISOLONE ACETATE 80 MG: 80 INJECTION, SUSPENSION INTRA-ARTICULAR; INTRALESIONAL; INTRAMUSCULAR; SOFT TISSUE at 10:53

## 2024-10-22 RX ADMIN — IOPAMIDOL 10 ML: 408 INJECTION, SOLUTION INTRATHECAL at 10:53

## 2024-10-22 NOTE — ANESTHESIA PROCEDURE NOTES
PAIN Epidural block      Patient reassessed immediately prior to procedure    Patient location during procedure: pain clinic  Indication:procedure for pain  Performed By  Anesthesiologist: Clark Poole MD  Preanesthetic Checklist  Completed: patient identified and risks and benefits discussed  Additional Notes  Diagnosis:     Post-Op Diagnosis Codes:     * Lumbar radiculopathy [M54.16]    Sedation:  none    Sedation time:    A lumbar epidural steroid injection with fluoroscopic guidance and an Isovue dye epidurogram was performed.  Under fluoroscopic guidance, the epidural space was identified and accessed, confirmed by loss of resistance to saline followed by injection of Isovue dye, which shows a good epidurogram.  The above medications were injected uneventfully.    Prep:  Pt Position:prone  Sterile Tech:cap, gloves, mask and sterile barrier  Prep:chlorhexidine gluconate and isopropyl alcohol  Monitoring:blood pressure monitoring, continuous pulse oximetry and EKG  Procedure:Sedation: no     Approach:midline  Guidance: fluoroscopy  Location:lumbar  Interspace: L5-S1.  Needle Type:Tuohy  Needle Gauge:20  Aspiration:negative  Medications:  Preservative Free Saline:1mL  Isovue:1mL  Comments:Isovue dye reveals good epidurogram  Depomedrol 80 mg  Post Assessment:  Pt Tolerance:patient tolerated the procedure well with no apparent complications  Complications:no

## 2024-10-22 NOTE — H&P
INTERVAL HISTORY:    She has had previous transforaminal injections which did not offer much relief.  She returns for another translaminar epidural today.  She has had this previously which gave her some relief.  She is going to be evaluated for facet injections after this.  Her MRI does show central spinal and foraminal stenosis  Current Outpatient Medications on File Prior to Encounter   Medication Sig Dispense Refill    acetaminophen (TYLENOL) 650 MG 8 hr tablet Take 1 tablet by mouth Every 8 (Eight) Hours As Needed for Mild Pain.      escitalopram (LEXAPRO) 10 MG tablet TAKE 1 AND 1/2 TABLETS BY MOUTH DAILY 135 tablet 0    fluticasone (FLONASE) 50 MCG/ACT nasal spray SHAKE LIQUID AND USE 2 SPRAYS IN EACH NOSTRIL DAILY 48 g 1    gabapentin (NEURONTIN) 300 MG capsule TAKE 1 CAPSULE BY MOUTH EVERY NIGHT 90 capsule 0    hydrocortisone (ANUSOL-HC) 25 MG suppository Insert 1 suppository into the rectum 2 (Two) Times a Day. 14 suppository 0    ibuprofen (ADVIL,MOTRIN) 200 MG tablet Take 1 tablet by mouth Every 6 (Six) Hours As Needed for Mild Pain.      levothyroxine (SYNTHROID, LEVOTHROID) 112 MCG tablet TAKE 1 TABLET BY MOUTH FRIDAY-SUNDAY 30 tablet 5    levothyroxine (SYNTHROID, LEVOTHROID) 125 MCG tablet TAKE 1 TABLET BY MOUTH DAILY 90 tablet 1    loratadine (CLARITIN) 5 MG chewable tablet Chew 1 tablet Daily.      losartan (COZAAR) 100 MG tablet TAKE 1 TABLET BY MOUTH DAILY 90 tablet 1     No current facility-administered medications on file prior to encounter.       Past Medical History:   Diagnosis Date    Allergic rhinitis     Ankle sprain     Arthritis of back     Arthritis of neck     Back pain, lumbosacral     Depression     Erythrocytosis     Fibrocystic breast     Fracture of ankle     Fracture, foot     Frozen shoulder     H/O bone density study 11/14/2014    Headache     Hip arthrosis     History of anemia     History of chest pain     History of colonic polyps     History of prior pregnancies     x7     Hyperlipidemia     Hypertension     Hypothyroidism     Intervertebral disc stenosis of neural canal of lumbar region 12/20/2022    Knee swelling     Low back pain     Osteoarthritis     Osteoporosis     Periarthritis of shoulder     Peripheral neuropathy     Potassium deficiency     Shortness of breath        No hematologic infectious or constitutional symptoms  Negative screen for OLAYINKA      Exam:  LMP  (LMP Unknown)   Airway Mallampatti 2  Alert and oriented      Diagnosis:  Post-Op Diagnosis Codes:     * Lumbar radiculopathy [M54.16]    Plan:  Lumbar 5 epidural steroid injection under fluoroscopic guidance    I have encouraged them to continue:  1.  Physical therapy exercises at home as prescribed by physical therapy or from the pain clinic handout.  Continuation of these exercises every day, or multiple times per week, even when the patient has good pain relief, was stressed to the patient as a preventative measure to decrease the frequency and severity of future pain episodes.  2.  Continue pain medicines as already prescribed.  If patient not currently taking any, it is recommended to begin Acetaminophen 1000 mg po q 8 hours.  If other medicines containing Acetaminophen are currently prescribed, maintain daily dose at 3000mg.    3.  If they can tolerate NSAIDS, it is recommended to take Ibuprofen 600 mg po q 6 hours for 7 days during pain exacerbations.   Alternatively, they may substitute an NSAID of their choice (e.g. Aleve)  4.  Heat and ice to the affected area as tolerated for pain control.   5.  Low impact exercise such as walking or water exercise was recommended to maintain overall health and aid in weight control.   6.  Follow up as needed for subsequent injections.

## 2024-10-22 NOTE — DISCHARGE INSTRUCTIONS

## 2024-11-01 DIAGNOSIS — R52 DIFFUSE PAIN: ICD-10-CM

## 2024-11-04 NOTE — TELEPHONE ENCOUNTER
Rx Refill Note  Requested Prescriptions     Pending Prescriptions Disp Refills    gabapentin (NEURONTIN) 300 MG capsule [Pharmacy Med Name: GABAPENTIN 300MG CAPSULES] 90 capsule      Sig: TAKE 1 CAPSULE BY MOUTH EVERY NIGHT      Last office visit with prescribing clinician: Visit date not found  Next office visit with prescribing clinician: Visit date not found         Jimena Cuba MA  11/04/24, 10:26 EST

## 2024-11-05 RX ORDER — GABAPENTIN 300 MG/1
300 CAPSULE ORAL NIGHTLY
Qty: 90 CAPSULE | Refills: 0 | Status: SHIPPED | OUTPATIENT
Start: 2024-11-05

## 2024-11-08 ENCOUNTER — OFFICE VISIT (OUTPATIENT)
Dept: INTERNAL MEDICINE | Facility: CLINIC | Age: 89
End: 2024-11-08
Payer: MEDICARE

## 2024-11-08 VITALS
OXYGEN SATURATION: 97 % | HEART RATE: 81 BPM | SYSTOLIC BLOOD PRESSURE: 122 MMHG | HEIGHT: 62 IN | DIASTOLIC BLOOD PRESSURE: 76 MMHG | BODY MASS INDEX: 24.8 KG/M2 | TEMPERATURE: 97.8 F | WEIGHT: 134.8 LBS

## 2024-11-08 DIAGNOSIS — I10 PRIMARY HYPERTENSION: Primary | Chronic | ICD-10-CM

## 2024-11-08 DIAGNOSIS — M99.53 INTERVERTEBRAL DISC STENOSIS OF NEURAL CANAL OF LUMBAR REGION: Chronic | ICD-10-CM

## 2024-11-08 DIAGNOSIS — R30.0 DYSURIA: Primary | ICD-10-CM

## 2024-11-08 DIAGNOSIS — Z23 NEED FOR INFLUENZA VACCINATION: ICD-10-CM

## 2024-11-08 DIAGNOSIS — R30.0 DYSURIA: ICD-10-CM

## 2024-11-08 DIAGNOSIS — E03.9 ACQUIRED HYPOTHYROIDISM: Chronic | ICD-10-CM

## 2024-11-08 LAB
ALBUMIN SERPL-MCNC: 4.2 G/DL (ref 3.5–5.2)
ALBUMIN/GLOB SERPL: 1.6 G/DL
ALP SERPL-CCNC: 71 U/L (ref 39–117)
ALT SERPL-CCNC: 14 U/L (ref 1–33)
AST SERPL-CCNC: 18 U/L (ref 1–32)
BILIRUB BLD-MCNC: NEGATIVE MG/DL
BILIRUB SERPL-MCNC: 0.3 MG/DL (ref 0–1.2)
BUN SERPL-MCNC: 20 MG/DL (ref 8–23)
BUN/CREAT SERPL: 29.9 (ref 7–25)
CALCIUM SERPL-MCNC: 9.6 MG/DL (ref 8.6–10.5)
CHLORIDE SERPL-SCNC: 103 MMOL/L (ref 98–107)
CLARITY, POC: CLEAR
CO2 SERPL-SCNC: 28.9 MMOL/L (ref 22–29)
COLOR UR: YELLOW
CREAT SERPL-MCNC: 0.67 MG/DL (ref 0.57–1)
EGFRCR SERPLBLD CKD-EPI 2021: 84.2 ML/MIN/1.73
EXPIRATION DATE: ABNORMAL
GLOBULIN SER CALC-MCNC: 2.7 GM/DL
GLUCOSE SERPL-MCNC: 70 MG/DL (ref 65–99)
GLUCOSE UR STRIP-MCNC: NEGATIVE MG/DL
KETONES UR QL: NEGATIVE
LEUKOCYTE EST, POC: ABNORMAL
Lab: ABNORMAL
NITRITE UR-MCNC: NEGATIVE MG/ML
PH UR: 5.5 [PH] (ref 5–8)
POTASSIUM SERPL-SCNC: 4.6 MMOL/L (ref 3.5–5.2)
PROT SERPL-MCNC: 6.9 G/DL (ref 6–8.5)
PROT UR STRIP-MCNC: NEGATIVE MG/DL
RBC # UR STRIP: NEGATIVE /UL
SODIUM SERPL-SCNC: 140 MMOL/L (ref 136–145)
SP GR UR: 1.02 (ref 1–1.03)
TSH SERPL DL<=0.005 MIU/L-ACNC: 3.08 UIU/ML (ref 0.27–4.2)
UROBILINOGEN UR QL: ABNORMAL

## 2024-11-09 NOTE — ASSESSMENT & PLAN NOTE
She reports that the recent epidural injection did not significantly alleviate her back pain. She is advised to continue with her current medication regimen, including Tylenol and gabapentin. Discussed safety concerns with ibuprofen, recheck kidney function today.

## 2024-11-09 NOTE — PROGRESS NOTES
"Chief Complaint  Vitamin D Deficiency (6 month follow up), Headache, Urinary Tract Infection (Burning with urination), and Back Pain (Last epidural didn't help)  Subjective        Kajal Severino presents to [facility]  Headache  Urinary Tract Infection     Back Pain  Associated symptoms include headaches.     History of Present Illness  The patient presents for evaluation of multiple medical concerns. She is accompanied by her son whom she has given permission to be present.    She reports experiencing mild allergies and with symptoms of nasal congestion and drainage. She c/o a headache upon awakening this morning that has since resolved. She denies any dizziness and/or lightheadedness. Her appetite remains good.    She continues to experience back pain despite receiving an epidural injection two weeks ago. She manages her pain with Tylenol, ibuprofen, and gabapentin at night. She reports no leg pain, burning, or tingling sensations. Her sleep is not disturbed by the pain. She has been less active recently, walking less than usual.    She experiences intermittent burning during urination. She maintains good hydration throughout the day.    Objective   Vital Signs:  /76 (BP Location: Left arm, Patient Position: Sitting, Cuff Size: Adult)   Pulse 81   Temp 97.8 °F (36.6 °C)   Ht 157.5 cm (62\")   Wt 61.1 kg (134 lb 12.8 oz)   SpO2 97%   BMI 24.66 kg/m²   Estimated body mass index is 24.66 kg/m² as calculated from the following:    Height as of this encounter: 157.5 cm (62\").    Weight as of this encounter: 61.1 kg (134 lb 12.8 oz).    BMI is within normal parameters. No other follow-up for BMI required.      Physical Exam  Constitutional:       Appearance: She is well-developed. She is not ill-appearing.   HENT:      Head: Normocephalic.      Right Ear: Hearing, tympanic membrane and external ear normal.      Left Ear: Hearing, tympanic membrane and external ear normal.      " Nose: Nose normal. No nasal deformity, mucosal edema or rhinorrhea.      Right Sinus: No maxillary sinus tenderness or frontal sinus tenderness.      Left Sinus: No maxillary sinus tenderness or frontal sinus tenderness.      Mouth/Throat:      Dentition: Normal dentition.   Eyes:      General: Lids are normal.         Right eye: No discharge.         Left eye: No discharge.      Conjunctiva/sclera: Conjunctivae normal.      Right eye: No exudate.     Left eye: No exudate.  Neck:      Thyroid: No thyroid mass or thyromegaly.      Vascular: No carotid bruit.      Trachea: Trachea normal.   Cardiovascular:      Rate and Rhythm: Regular rhythm.      Pulses: Normal pulses.      Heart sounds: Normal heart sounds. No murmur heard.  Pulmonary:      Effort: No respiratory distress.      Breath sounds: Normal breath sounds. No decreased breath sounds, wheezing, rhonchi or rales.   Abdominal:      General: Bowel sounds are normal.      Palpations: Abdomen is soft.      Tenderness: There is no abdominal tenderness.   Musculoskeletal:      Cervical back: Normal range of motion. No edema.   Lymphadenopathy:      Head:      Right side of head: No submental, submandibular, tonsillar, preauricular, posterior auricular or occipital adenopathy.      Left side of head: No submental, submandibular, tonsillar, preauricular, posterior auricular or occipital adenopathy.   Skin:     General: Skin is warm and dry.      Nails: There is no clubbing.   Neurological:      Mental Status: She is alert.   Psychiatric:         Behavior: Behavior is cooperative.        Physical Exam  Throat exhibits slight redness with some postnasal drainage.    Vital Signs  Blood pressure measures 122/76.    Result Review :  The following data was reviewed by: HAILY Pisano on 11/08/2024:  Common labs          2/13/2024    11:11 6/20/2024    10:52 11/8/2024    11:42   Common Labs   Glucose 84  73  70    BUN 24  25  20    Creatinine 0.79  0.70  0.67     Sodium 142  146  140    Potassium 4.0  4.2  4.6    Chloride 104  108  103    Calcium 9.8  9.1  9.6    Total Protein 6.6  6.2  6.9    Albumin 4.2  4.0  4.2    Total Bilirubin 0.4  0.4  0.3    Alkaline Phosphatase 74  71  71    AST (SGOT) 18  16  18    ALT (SGPT) 12  11  14    WBC 5.64  5.85     Hemoglobin 12.3  12.7     Hematocrit 37.8  39.1     Platelets 194  211     Total Cholesterol  155     Triglycerides  150     HDL Cholesterol  50     LDL Cholesterol   79       Data reviewed : Consultant notes pain mgmt 10/22/24      Results  Laboratory Studies  Blood sugar was 73. Kidney and liver function were normal. Blood count was normal. Vitamin D level was good.             Assessment and Plan   Diagnoses and all orders for this visit:    1. Primary hypertension (Primary)  Assessment & Plan:  Her blood pressure was slightly elevated, which could be a result of the steroid injection administered two weeks ago. It was rechecked during the visit and recorded as 122/76 which is within normal range. Continue losartan daily.    Orders:  -     Comprehensive Metabolic Panel    2. Dysuria  Comments:  Urine sent for culture to further evaluate  Orders:  -     POC Urinalysis Dipstick, Automated    3. Acquired hypothyroidism  Assessment & Plan:  She is currently alternating Synthroid 112 mcg with 125 mcg for thyroid replacement, recheck TSH.    Orders:  -     TSH Rfx On Abnormal To Free T4    4. Intervertebral disc stenosis of neural canal of lumbar region  Assessment & Plan:  She reports that the recent epidural injection did not significantly alleviate her back pain. She is advised to continue with her current medication regimen, including Tylenol and gabapentin. Discussed safety concerns with ibuprofen, recheck kidney function today.      5. Need for influenza vaccination  -     Fluzone High-Dose 65+yrs      Assessment & Plan           Follow Up   Return in about 4 months (around 3/8/2025).  Patient was given instructions and  counseling regarding her condition or for health maintenance advice. Please see specific information pulled into the AVS if appropriate.     Patient or patient representative verbalized consent for the use of Ambient Listening during the visit with  HAILY Pisano for chart documentation. 11/9/2024  09:46 EST  Answers submitted by the patient for this visit:  Primary Reason for Visit (Submitted on 11/7/2024)  What is the primary reason for your visit?: Painful Urination

## 2024-11-09 NOTE — ASSESSMENT & PLAN NOTE
Her blood pressure was slightly elevated, which could be a result of the steroid injection administered two weeks ago. It was rechecked during the visit and recorded as 122/76 which is within normal range. Continue losartan daily.

## 2024-11-10 LAB
BACTERIA #/AREA URNS HPF: ABNORMAL /HPF
BACTERIA UR CULT: NORMAL
BACTERIA UR CULT: NORMAL
CASTS URNS MICRO: ABNORMAL
EPI CELLS #/AREA URNS HPF: ABNORMAL /HPF
RBC #/AREA URNS HPF: ABNORMAL /HPF
WBC #/AREA URNS HPF: ABNORMAL /HPF

## 2024-12-23 DIAGNOSIS — F33.9 MONOPOLAR DEPRESSION: Chronic | ICD-10-CM

## 2024-12-23 RX ORDER — ESCITALOPRAM OXALATE 10 MG/1
TABLET ORAL
Qty: 135 TABLET | Refills: 0 | Status: SHIPPED | OUTPATIENT
Start: 2024-12-23

## 2025-02-07 DIAGNOSIS — R52 DIFFUSE PAIN: ICD-10-CM

## 2025-02-07 DIAGNOSIS — I10 ESSENTIAL HYPERTENSION: ICD-10-CM

## 2025-02-07 NOTE — TELEPHONE ENCOUNTER
Rx Refill Note  Requested Prescriptions     Pending Prescriptions Disp Refills    gabapentin (NEURONTIN) 300 MG capsule 90 capsule 0     Sig: Take 1 capsule by mouth Every Night.      Last office visit with prescribing clinician: Visit date not found  Next office visit with prescribing clinician: Visit date not found         Jimena Cuba MA  02/07/25, 12:03 EST

## 2025-02-10 DIAGNOSIS — I10 ESSENTIAL HYPERTENSION: ICD-10-CM

## 2025-02-10 RX ORDER — GABAPENTIN 300 MG/1
300 CAPSULE ORAL NIGHTLY
Qty: 90 CAPSULE | Refills: 1 | Status: SHIPPED | OUTPATIENT
Start: 2025-02-10

## 2025-02-10 RX ORDER — GABAPENTIN 300 MG/1
300 CAPSULE ORAL NIGHTLY
Qty: 90 CAPSULE | OUTPATIENT
Start: 2025-02-10

## 2025-02-10 RX ORDER — LOSARTAN POTASSIUM 100 MG/1
TABLET ORAL
Qty: 90 TABLET | Refills: 1 | OUTPATIENT
Start: 2025-02-10

## 2025-02-10 RX ORDER — LOSARTAN POTASSIUM 100 MG/1
100 TABLET ORAL DAILY
Qty: 90 TABLET | Refills: 1 | Status: SHIPPED | OUTPATIENT
Start: 2025-02-10

## 2025-02-24 ENCOUNTER — HOSPITAL ENCOUNTER (OUTPATIENT)
Dept: PAIN MEDICINE | Facility: HOSPITAL | Age: 89
Discharge: HOME | End: 2025-02-24
Payer: MEDICARE

## 2025-02-24 ENCOUNTER — ANESTHESIA EVENT (OUTPATIENT)
Dept: PAIN MEDICINE | Facility: HOSPITAL | Age: 89
End: 2025-02-24
Payer: MEDICARE

## 2025-02-24 ENCOUNTER — HOSPITAL ENCOUNTER (OUTPATIENT)
Dept: GENERAL RADIOLOGY | Facility: HOSPITAL | Age: 89
Discharge: HOME | End: 2025-02-24
Payer: MEDICARE

## 2025-02-24 ENCOUNTER — ANESTHESIA (OUTPATIENT)
Dept: PAIN MEDICINE | Facility: HOSPITAL | Age: 89
End: 2025-02-24
Payer: MEDICARE

## 2025-02-24 VITALS
HEART RATE: 70 BPM | RESPIRATION RATE: 14 BRPM | OXYGEN SATURATION: 95 % | SYSTOLIC BLOOD PRESSURE: 125 MMHG | DIASTOLIC BLOOD PRESSURE: 86 MMHG | TEMPERATURE: 97.3 F

## 2025-02-24 DIAGNOSIS — R52 PAIN: ICD-10-CM

## 2025-02-24 DIAGNOSIS — M54.16 LUMBAR RADICULOPATHY: Primary | ICD-10-CM

## 2025-02-24 PROCEDURE — 25510000001 IOPAMIDOL 41 % SOLUTION: Performed by: ANESTHESIOLOGY

## 2025-02-24 PROCEDURE — 25010000002 METHYLPREDNISOLONE PER 80 MG: Performed by: ANESTHESIOLOGY

## 2025-02-24 PROCEDURE — 77003 FLUOROGUIDE FOR SPINE INJECT: CPT

## 2025-02-24 RX ORDER — FENTANYL CITRATE 50 UG/ML
50 INJECTION, SOLUTION INTRAMUSCULAR; INTRAVENOUS ONCE
Status: DISCONTINUED | OUTPATIENT
Start: 2025-02-24 | End: 2025-02-25 | Stop reason: HOSPADM

## 2025-02-24 RX ORDER — IOPAMIDOL 408 MG/ML
10 INJECTION, SOLUTION INTRATHECAL
Status: COMPLETED | OUTPATIENT
Start: 2025-02-24 | End: 2025-02-24

## 2025-02-24 RX ORDER — MIDAZOLAM HYDROCHLORIDE 1 MG/ML
1 INJECTION, SOLUTION INTRAMUSCULAR; INTRAVENOUS ONCE AS NEEDED
Status: DISCONTINUED | OUTPATIENT
Start: 2025-02-24 | End: 2025-02-25 | Stop reason: HOSPADM

## 2025-02-24 RX ORDER — METHYLPREDNISOLONE ACETATE 80 MG/ML
80 INJECTION, SUSPENSION INTRA-ARTICULAR; INTRALESIONAL; INTRAMUSCULAR; SOFT TISSUE ONCE
Status: COMPLETED | OUTPATIENT
Start: 2025-02-24 | End: 2025-02-24

## 2025-02-24 RX ORDER — LIDOCAINE HYDROCHLORIDE 10 MG/ML
1 INJECTION, SOLUTION INFILTRATION; PERINEURAL ONCE
Status: DISCONTINUED | OUTPATIENT
Start: 2025-02-24 | End: 2025-02-25 | Stop reason: HOSPADM

## 2025-02-24 RX ADMIN — METHYLPREDNISOLONE ACETATE 80 MG: 80 INJECTION, SUSPENSION INTRA-ARTICULAR; INTRALESIONAL; INTRAMUSCULAR; SOFT TISSUE at 12:26

## 2025-02-24 RX ADMIN — IOPAMIDOL 10 ML: 408 INJECTION, SOLUTION INTRATHECAL at 12:26

## 2025-02-24 NOTE — DISCHARGE INSTRUCTIONS
EPIDURAL STEROID INJECTION          An epidural steroid injection is a shot of steroid medicine and numbing medicine that is given into the space between the spinal cord and the bones of the back (epidural space).  The injection helps relieve pain by an irritated or swollen nerve root.    TELL YOUR HEALTH CARE PROVIDER ABOUT:  Any allergies you have  All medicines you are taking including any over the counter medicines  Any blood disorders you have  Any surgeries you have had  Any medical conditions you have  Whether you are pregnant or may be pregnant    WHAT ARE THE RISK?  Generally, this is a safe procedure. However,problems may occur, including  Headache  Bleeding  Infection  Allergic Reaction  Nerve Damage    WHAT CAN I EXPECT AFTER THE PROCEDURE?    INJECTION SITE  Remove the Band-Aid/s after 24 hours  Check your injection site every day for signs of infection.  Check for:             Redness             Bleeding (small amt is normal)             Warmth             Pus or bad odor  Some numbness may be experienced for several hours following the procedure.  Avoid using heat on the injection site for 24 hours. You may use ice intermittently if needed by placing a         towel between your skin and the ice bag and using the ice for 20 minutes 2-3 times a day.  Do not take baths, swim or use a hot tub for 24 hours.    ACTIVITY  No strenuous activity for 24 hours then return to normal activity as tolerated.  If your leg is numb, no driving until full sensation and strength has returned.    GENERAL INSTRUCTIONS:  The injection site may feel numb, use ice with caution if numbness is present and no heat for 24 hours or until numbness is gone.   If you have numbness or weakness in your arm or leg, use those areas with caution until normal sensation returns.  It is not uncommon to notice an increase in discomfort or a change in the location of discomfort for 3-4 days after the procedure.  If discomfort is noticed  at the injection site, ice may be            applied to that area for 20 min 2-3 times a day.  Take the pain medicine your physician has prescribed or over the counter pain relievers as long as you do not have any contraindications.  If you are a diabetic, monitor your blood sugar closely.  The steroids used in your procedure may increase your blood sugar level up to 36 hours after the injection.  If your blood sugar is greater than 250, call the physician that helps you monitor your blood sugar.  Keep all follow-up visits as scheduled by your health care provider. This is important.    CONTACT OUR OFFICE IF:  You have any of these signs of infection            -Redness, swelling, or warmth around your injection site.            -Fluid or blood coming from your injection site (small amt of blood is normal)            -Pus or a bad odor from your injection site            -A fever  You develop a severe headache or a stiff neck  You lose control of your bladder or bowel movements      PAIN MANAGEMENT CENTER HOURS   Monday-Friday 7:30 am. - 4:00 pm.  For any problem related to your procedure we can be reached at [phone].  If you experience an emergency with your procedure, call [phone] or go to the emergency room.      What is [employer]?  [employer] is a fitness and wellness program offered at no additional cost to seniors 65+ on eligible Medicare plans that helps you get active, get fit, and connect with others.  The program is designed for all levels and abilities and provides access to online and in-person classes, over 15,000 fitness locations, and health & wellness discounts.  Before starting any exercise program, consult with your primary care provider.  For additional information and to check eligibility:  [email or web address]

## 2025-02-24 NOTE — ANESTHESIA PROCEDURE NOTES
PAIN Epidural block    Pre-sedation assessment completed: 2/24/2025 12:23 PM    Patient reassessed immediately prior to procedure    Patient location during procedure: pain clinic  Start Time: 2/24/2025 12:25 PM  Stop Time: 2/24/2025 12:33 PM  Indication:at surgeon's request and procedure for pain  Performed By  Anesthesiologist: Arjun Sullivan MD  Preanesthetic Checklist  Completed: patient identified, risks and benefits discussed, surgical consent, monitors and equipment checked, pre-op evaluation and timeout performed  Additional Notes  Post-Op Diagnosis Codes:     * Lumbar radiculopathy [M54.16]    Prep:  Pt Position:prone  Sterile Tech:sterile barrier, mask and gloves  Prep:chlorhexidine gluconate and isopropyl alcohol  Monitoring:blood pressure monitoring, continuous pulse oximetry and EKG  Procedure:Sedation: no     Approach:right paramedian  Guidance: fluoroscopy  Location:lumbar  Level:L5-S1  Needle Gauge:20 G  Aspiration:negative  Test Dose:negative  Medications:  Depomedrol:80mg  Post Assessment:  Pt Tolerance:patient tolerated the procedure well with no apparent complications  Complications:no

## 2025-02-24 NOTE — H&P
[facility]    History and Physical    Patient Name: Kajal Severino  :  1935  MRN:  [id number]  Date of Admission: 2025    Subjective     Patient is a 89 y.o. female presents with chief complaint of chronic low back and hips: right pain.  Onset of symptoms was gradual starting several years ago.  Symptoms are associated/aggravated by activity or standing. Symptoms improve with injection    The following portions of the patients history were reviewed and updated as appropriate: current medications, allergies, past medical history, past surgical history, past family history, past social history, and problem list        She has had both translaminar and transforaminal epidural steroid injections.  She has had suboptimal results from each of these at times.  Today we will look into a translaminar epidural steroid injection however if there is any difficulty getting in we will try to go transforaminal.  In reviewing her x-rays she does seem to have very tight interspaces.        Objective     Past Medical History:   Past Medical History:   Diagnosis Date    Allergic rhinitis     Ankle sprain     Anxiety     Arthritis of back     Arthritis of neck     Back pain, lumbosacral     Colon polyp     Depression     Diverticulosis     Erythrocytosis     Fibrocystic breast     Fracture of ankle     Fracture, foot     Frozen shoulder     GERD (gastroesophageal reflux disease)     H/O bone density study 2014    Headache     Hip arthrosis     History of anemia     History of chest pain     History of colonic polyps     History of prior pregnancies     x7    Hyperlipidemia     Hypertension     Hypothyroidism     Intervertebral disc stenosis of neural canal of lumbar region 2022    Knee swelling     Low back pain     Osteoarthritis     Osteopenia     Osteoporosis     Periarthritis of shoulder     Peripheral neuropathy     Potassium deficiency     Scoliosis     Shortness of breath      Past Surgical History:    Past Surgical History:   Procedure Laterality Date    BREAST BIOPSY Bilateral 1980    x3     SECTION N/A     x1    COLONOSCOPY N/A 10/05/2012    + Diverticulosis and polyps    EPIDURAL BLOCK      PAP SMEAR N/A 11/15/2006    TONSILLECTOMY      TRIGGER POINT INJECTION      Epidurals     Family History:   Family History   Problem Relation Age of Onset    Heart disease Mother     Hypertension Mother     Diabetes Father     Heart disease Father     Hypertension Father     Breast cancer Sister     Leukemia Brother         CML    Breast cancer Daughter     Hypertension Daughter     Thyroid disease Daughter     Colon cancer Brother     Prostate cancer Brother     Prostate cancer Brother     Kidney cancer Brother     Hypertension Daughter     Hypertension Son      Social History:   Social History     Socioeconomic History    Marital status:     Number of children: 4    Years of education: High School   Tobacco Use    Smoking status: Former     Current packs/day: 0.00     Average packs/day: 0.3 packs/day for 15.0 years (3.8 ttl pk-yrs)     Types: Cigarettes     Start date: 1980     Quit date: 1995     Years since quittin.1     Passive exposure: Past    Smokeless tobacco: Never   Vaping Use    Vaping status: Never Used   Substance and Sexual Activity    Alcohol use: No    Drug use: No    Sexual activity: Not Currently     Partners: Male       Vital Signs Range for the last 24 hours  Temperature: Temp:  [36.3 °C (97.3 °F)] 36.3 °C (97.3 °F)   Temp Source:     BP: BP: (139)/(86) 139/86   Pulse: Heart Rate:  [65] 65   Respirations: Resp:  [16] 16   SPO2: SpO2:  [96 %] 96 %   O2 Amount (l/min):     O2 Devices     Weight:           --------------------------------------------------------------------------------    Current Outpatient Medications   Medication Sig Dispense Refill    acetaminophen (TYLENOL) 650 MG 8 hr tablet Take 1 tablet by mouth Every 8 (Eight) Hours As Needed for Mild Pain.       escitalopram (LEXAPRO) 10 MG tablet TAKE 1 AND 1/2 TABLETS BY MOUTH DAILY 135 tablet 0    fluticasone (FLONASE) 50 MCG/ACT nasal spray SHAKE LIQUID AND USE 2 SPRAYS IN EACH NOSTRIL DAILY 48 g 1    gabapentin (NEURONTIN) 300 MG capsule Take 1 capsule by mouth Every Night. 90 capsule 1    ibuprofen (ADVIL,MOTRIN) 200 MG tablet Take 1 tablet by mouth Every 6 (Six) Hours As Needed for Mild Pain.      levothyroxine (SYNTHROID, LEVOTHROID) 112 MCG tablet TAKE 1 TABLET BY MOUTH FRIDAY-SUNDAY 30 tablet 5    levothyroxine (SYNTHROID, LEVOTHROID) 125 MCG tablet TAKE 1 TABLET BY MOUTH DAILY 90 tablet 1    loratadine (CLARITIN) 5 MG chewable tablet Chew 1 tablet Daily.      losartan (COZAAR) 100 MG tablet Take 1 tablet by mouth Daily. 90 tablet 1     No current facility-administered medications for this encounter.       --------------------------------------------------------------------------------  Assessment & Plan      Anesthesia Evaluation           Pain impairs ability to perform ADLs: Ambulation and Exercise/Activity  Modalities previously tried to control pain with limited effectiveness within the last 4-6 weeks: OTC medications     Airway   Mallampati: II  TM distance: >3 FB  Neck ROM: full  No difficulty expected  Dental      Pulmonary    (+) ,shortness of breath  (-) wheezes  Cardiovascular     Rhythm: regular    (+) hypertension, hyperlipidemia    PE comment: Posterior tibial pulses are palpable bilaterally    Neuro/Psych  (+) numbness, psychiatric history  (-) normal sensory deficit, left straight leg raise test, right straight leg raise test  GI/Hepatic/Renal/Endo    (+) GERD, thyroid problem     Musculoskeletal     Abdominal    Substance History      OB/GYN          Other                 Diagnosis and Plan    Treatment Plan  ASA 3   Patient has had previous injection/procedure with 25-50% improvement.   Procedures: Lumbar Epidural Steroid Injection(LESI), With fluoroscopy,      Anesthetic plan and risks discussed  "with patient.      Discussed with patient risk and benefits of MARIMAR including but not limited to : Bleeding, infection, PDPH, inadvertant spinal anesthetic, worsening pain, hyperglycemia, hypertension, CHF, nerve damage, steroid \"toxicity\" and AVN of hips.  Pt agrees to proceed.  Diagnosis     * Lumbar radiculopathy [M54.16]                      "

## 2025-03-07 DIAGNOSIS — E03.9 ACQUIRED HYPOTHYROIDISM: ICD-10-CM

## 2025-03-07 RX ORDER — LEVOTHYROXINE SODIUM 112 UG/1
TABLET ORAL
Qty: 30 TABLET | Refills: 5 | Status: SHIPPED | OUTPATIENT
Start: 2025-03-07

## 2025-03-07 RX ORDER — LEVOTHYROXINE SODIUM 125 UG/1
TABLET ORAL
Qty: 90 TABLET | Refills: 1 | Status: SHIPPED | OUTPATIENT
Start: 2025-03-07

## 2025-03-12 ENCOUNTER — OFFICE VISIT (OUTPATIENT)
Dept: INTERNAL MEDICINE | Facility: CLINIC | Age: 89
End: 2025-03-12
Payer: MEDICARE

## 2025-03-12 VITALS
DIASTOLIC BLOOD PRESSURE: 70 MMHG | HEIGHT: 62 IN | WEIGHT: 138 LBS | OXYGEN SATURATION: 93 % | HEART RATE: 89 BPM | TEMPERATURE: 96.8 F | SYSTOLIC BLOOD PRESSURE: 98 MMHG | BODY MASS INDEX: 25.4 KG/M2

## 2025-03-12 DIAGNOSIS — M54.41 CHRONIC BILATERAL LOW BACK PAIN WITH RIGHT-SIDED SCIATICA: Chronic | ICD-10-CM

## 2025-03-12 DIAGNOSIS — E03.9 ACQUIRED HYPOTHYROIDISM: Chronic | ICD-10-CM

## 2025-03-12 DIAGNOSIS — G89.29 CHRONIC BILATERAL LOW BACK PAIN WITH RIGHT-SIDED SCIATICA: Chronic | ICD-10-CM

## 2025-03-12 DIAGNOSIS — I10 PRIMARY HYPERTENSION: Primary | Chronic | ICD-10-CM

## 2025-03-23 NOTE — PROGRESS NOTES
Chief Complaint  Back Pain (Last injection 2/2024, first it was helping not so much. Pt family looking to do ablation per Pain Clinic recommendation ) and Headache  Subjective        Kajal Severino presents to [facility]    Symptoms include: joint pain, headaches and myalgias.   Pertinent negative symptoms include no abdominal pain, no anorexia, no change in stool, no chest pain, no chills, no congestion, no diaphoresis, no fatigue, no fever, no joint swelling, no nausea, no neck pain, no numbness, no rash, no sore throat, no swollen glands, no dysuria, no vertigo, no visual change, no vomiting and no weakness.   Treatment and/or Medications comments include: ibuprofen, tylenol     History of Present Illness  The patient presents for evaluation of back pain, thyroid management, and sleep issues. She presents with her son whom she has given permission to be present.    She reports persistent back pain, which has not been significantly alleviated by the recent epidural injection. Initially, the epidurals provided relief within 2 days, but their efficacy has since diminished. She experiences pain across her lower back but does not report any associated leg pain. She recalls a brief period of lower leg discomfort, which has since resolved. She maintains an active lifestyle, including walking and performing pelvic lifts to strengthen her core. She continues to manage her pain with Tylenol and ibuprofen as well as gabapentin at night. She has a scheduled appointment with pain management on 05/29/2025 for another epidural injection.    She typically sleeps well, although she occasionally experiences difficulty settling down, resulting in sleep deprivation. However, she compensates for this by sleeping well the following night. She takes melatonin 5 mg as needed to aid sleep.    She is currently on a regimen of thyroid medication, taking 112 mcg during the week and 125 mcg on weekends. She  "expresses confusion about this dosage variation and questions whether it would be beneficial to maintain a consistent dosage of 125 mcg throughout the week.    Supplemental Information  She reports no urinary symptoms or infections. She maintains good hydration by drinking plenty of water. She reports no neck pain, dizziness, or falls. She also reports no leg pain or ankle swelling. She experiences occasional headaches and a runny nose, which she does not consider problematic.    Objective   Vital Signs:  BP 98/70 (BP Location: Left arm, Patient Position: Sitting, Cuff Size: Adult)   Pulse 89   Temp 96.8 °F (36 °C) (Temporal)   Ht 157.5 cm (62.01\")   Wt 62.6 kg (138 lb)   SpO2 93%   BMI 25.23 kg/m²   Estimated body mass index is 25.23 kg/m² as calculated from the following:    Height as of this encounter: 157.5 cm (62.01\").    Weight as of this encounter: 62.6 kg (138 lb).    BMI is >= 25 and <30. (Overweight) The following options were offered after discussion;: exercise counseling/recommendations and nutrition counseling/recommendations      Physical Exam  Constitutional:       Appearance: She is well-developed. She is not ill-appearing.   HENT:      Head: Normocephalic.      Right Ear: Hearing, tympanic membrane and external ear normal.      Left Ear: Hearing, tympanic membrane and external ear normal.      Nose: Nose normal. No nasal deformity, mucosal edema or rhinorrhea.      Right Sinus: No maxillary sinus tenderness or frontal sinus tenderness.      Left Sinus: No maxillary sinus tenderness or frontal sinus tenderness.      Mouth/Throat:      Dentition: Normal dentition.   Eyes:      General: Lids are normal.         Right eye: No discharge.         Left eye: No discharge.      Conjunctiva/sclera: Conjunctivae normal.      Right eye: No exudate.     Left eye: No exudate.  Neck:      Thyroid: No thyroid mass or thyromegaly.      Vascular: No carotid bruit.      Trachea: Trachea normal.   Cardiovascular: "      Rate and Rhythm: Regular rhythm.      Pulses: Normal pulses.      Heart sounds: Normal heart sounds. No murmur heard.  Pulmonary:      Effort: No respiratory distress.      Breath sounds: Normal breath sounds. No decreased breath sounds, wheezing, rhonchi or rales.   Abdominal:      General: Bowel sounds are normal.      Palpations: Abdomen is soft.      Tenderness: There is no abdominal tenderness.   Musculoskeletal:      Cervical back: Normal range of motion. No edema.   Lymphadenopathy:      Head:      Right side of head: No submental, submandibular, tonsillar, preauricular, posterior auricular or occipital adenopathy.      Left side of head: No submental, submandibular, tonsillar, preauricular, posterior auricular or occipital adenopathy.   Skin:     General: Skin is warm and dry.      Nails: There is no clubbing.   Neurological:      Mental Status: She is alert.   Psychiatric:         Behavior: Behavior is cooperative.        Physical Exam      Result Review :  The following data was reviewed by: HAILY Pisano on 03/12/2025:  Common labs          6/20/2024    10:52 11/8/2024    11:42   Common Labs   Glucose 73  70    BUN 25  20    Creatinine 0.70  0.67    Sodium 146  140    Potassium 4.2  4.6    Chloride 108  103    Calcium 9.1  9.6    Albumin 4.0  4.2    Total Bilirubin 0.4  0.3    Alkaline Phosphatase 71  71    AST (SGOT) 16  18    ALT (SGPT) 11  14    WBC 5.85     Hemoglobin 12.7     Hematocrit 39.1     Platelets 211     Total Cholesterol 155     Triglycerides 150     HDL Cholesterol 50     LDL Cholesterol  79       Data reviewed : Consultant notes pain mgmt 2/24/25      Results  Laboratory Studies  Labs from November 2024 revealed TSH was 3. Blood sugar was 70. Kidney function was normal. Liver enzymes were normal.             Assessment and Plan   Diagnoses and all orders for this visit:    1. Primary hypertension (Primary)  Assessment & Plan:  BP is low in the office today (previously  elevate din the office), denies dizziness and/or lightheadedness. She will monitor BP at home and call if readings remain low.      2. Acquired hypothyroidism  Assessment & Plan:  Her TSH level was recorded as 3 in November 2024. The patient will transition to a consistent dosage of 125 mcg of thyroid medication daily, discontinuing the 112 mcg dosage. Potential side effects of excessive thyroid medication, such as nervousness, jitteriness, and weight loss, have been discussed. She has been advised to monitor for these symptoms and report any changes. A follow-up appointment is scheduled for July 2025 to recheck her thyroid levels.      3. Chronic bilateral low back pain with right-sided sciatica  Assessment & Plan:  The patient reports that the recent epidural did not provide significant relief. She has been informed that the upcoming nerve block procedure on 05/29/2025 will serve as a trial to assess the potential effectiveness of ablation therapy. If the nerve block does not provide relief, ablation will not be pursued. She continues to take Tylenol and ibuprofen along with gabapentin for pain management.        Assessment & Plan    Sleep issues.  The patient occasionally experiences difficulty sleeping, which she attributes to anxiety about medical appointments. She takes 5 mg of melatonin on nights when she has trouble sleeping.         Follow Up   Return if symptoms worsen or fail to improve, for Next scheduled follow up.  Patient was given instructions and counseling regarding her condition or for health maintenance advice. Please see specific information pulled into the AVS if appropriate.     Patient or patient representative verbalized consent for the use of Ambient Listening during the visit with  HAILY Pisano for chart documentation. 3/23/2025  15:41 EDT

## 2025-03-23 NOTE — ASSESSMENT & PLAN NOTE
The patient reports that the recent epidural did not provide significant relief. She has been informed that the upcoming nerve block procedure on 05/29/2025 will serve as a trial to assess the potential effectiveness of ablation therapy. If the nerve block does not provide relief, ablation will not be pursued. She continues to take Tylenol and ibuprofen along with gabapentin for pain management.

## 2025-03-23 NOTE — ASSESSMENT & PLAN NOTE
BP is low in the office today (previously elevate din the office), denies dizziness and/or lightheadedness. She will monitor BP at home and call if readings remain low.

## 2025-03-23 NOTE — ASSESSMENT & PLAN NOTE
Her TSH level was recorded as 3 in November 2024. The patient will transition to a consistent dosage of 125 mcg of thyroid medication daily, discontinuing the 112 mcg dosage. Potential side effects of excessive thyroid medication, such as nervousness, jitteriness, and weight loss, have been discussed. She has been advised to monitor for these symptoms and report any changes. A follow-up appointment is scheduled for July 2025 to recheck her thyroid levels.

## 2025-04-01 ENCOUNTER — ANESTHESIA EVENT (OUTPATIENT)
Dept: PAIN MEDICINE | Facility: HOSPITAL | Age: 89
End: 2025-04-01

## 2025-04-01 ENCOUNTER — HOSPITAL ENCOUNTER (OUTPATIENT)
Dept: PAIN MEDICINE | Facility: HOSPITAL | Age: 89
Discharge: HOME | End: 2025-04-01
Admitting: ANESTHESIOLOGY
Payer: MEDICARE

## 2025-04-01 ENCOUNTER — ANESTHESIA (OUTPATIENT)
Dept: PAIN MEDICINE | Facility: HOSPITAL | Age: 89
End: 2025-04-01

## 2025-04-01 DIAGNOSIS — M47.816 SPONDYLOSIS OF LUMBAR REGION WITHOUT MYELOPATHY OR RADICULOPATHY: Primary | ICD-10-CM

## 2025-04-01 PROCEDURE — G0463 HOSPITAL OUTPT CLINIC VISIT: HCPCS

## 2025-04-01 NOTE — H&P
[facility]    History and Physical    Patient Name: Kajal Severino  :  1935  MRN:  [id number]  Date of Admission: 2025    Subjective     Patient is a 89 y.o. female presents with chief complaint of chronic low back pain.  Onset of symptoms was gradual starting unknown years ago.  Symptoms are associated/aggravated by activity, standing, or twisting. Symptoms improve with nothing    The following portions of the patients history were reviewed and updated as appropriate: current medications, allergies, past medical history, past surgical history, past family history, past social history, and problem list    She has low back pain.  Is primarily axial in nature and may radiate to the top of her waist but does not go down into her buttocks or the back of her legs.  She has had both translaminar and transforaminal epidural steroid injections.  The used to be fairly helpful however they have become less and less effective.  She had a translaminar epidural steroid injection recently which was just minimally to not helpful at all.    She has MRI from  which shows moderate facet disease bilaterally at L3-4 severe facet disease at L4-5 and advanced facet disease at L5-S1.  She also has some foraminal stenosis and degenerative disc disease as well.    She rates her pain as high as an 8 out of 10 whenever it is bad.  Getting up and ambulating seems to exacerbate her pain greatly.  She occasionally takes Tylenol but they try to avoid any other medications if at all possible        Objective     Past Medical History:   Past Medical History:   Diagnosis Date   • Allergic rhinitis    • Ankle sprain    • Anxiety    • Arthritis of back    • Arthritis of neck    • Back pain, lumbosacral    • Colon polyp    • Depression    • Diverticulosis    • Erythrocytosis    • Fibrocystic breast    • Fracture of ankle    • Fracture, foot    • Frozen shoulder    • GERD (gastroesophageal reflux disease)    • H/O bone density study  2014   • Headache    • Hip arthrosis    • History of anemia    • History of chest pain    • History of colonic polyps    • History of prior pregnancies     x7   • Hyperlipidemia    • Hypertension    • Hypothyroidism    • Intervertebral disc stenosis of neural canal of lumbar region 2022   • Knee swelling    • Low back pain    • Osteoarthritis    • Osteopenia    • Osteoporosis    • Periarthritis of shoulder    • Peripheral neuropathy    • Potassium deficiency    • Scoliosis    • Shortness of breath      Past Surgical History:   Past Surgical History:   Procedure Laterality Date   • BREAST BIOPSY Bilateral 1980    x3   •  SECTION N/A     x1   • COLONOSCOPY N/A 10/05/2012    + Diverticulosis and polyps   • EPIDURAL BLOCK     • PAP SMEAR N/A 11/15/2006   • TONSILLECTOMY     • TRIGGER POINT INJECTION      Epidurals     Family History:   Family History   Problem Relation Age of Onset   • Heart disease Mother    • Hypertension Mother    • Diabetes Father    • Heart disease Father    • Hypertension Father    • Breast cancer Sister    • Leukemia Brother         CML   • Breast cancer Daughter    • Hypertension Daughter    • Thyroid disease Daughter    • Colon cancer Brother    • Prostate cancer Brother    • Prostate cancer Brother    • Kidney cancer Brother    • Hypertension Daughter    • Hypertension Son      Social History:   Social History     Socioeconomic History   • Marital status:    • Number of children: 4   • Years of education: High School   Tobacco Use   • Smoking status: Former     Current packs/day: 0.00     Average packs/day: 0.3 packs/day for 17.3 years (4.5 ttl pk-yrs)     Types: Cigarettes     Start date: 1980     Quit date: 1995     Years since quittin.2     Passive exposure: Past   • Smokeless tobacco: Never   Vaping Use   • Vaping status: Never Used   Substance and Sexual Activity   • Alcohol use: No   • Drug use: No   • Sexual activity: Not Currently     Partners:  Male       Vital Signs Range for the last 24 hours  Temperature:     Temp Source:     BP:     Pulse:     Respirations:     SPO2:     O2 Amount (l/min):     O2 Devices     Weight:           --------------------------------------------------------------------------------    Current Outpatient Medications   Medication Sig Dispense Refill   • acetaminophen (TYLENOL) 650 MG 8 hr tablet Take 1 tablet by mouth Every 8 (Eight) Hours As Needed for Mild Pain.     • escitalopram (LEXAPRO) 10 MG tablet TAKE 1 AND 1/2 TABLETS BY MOUTH DAILY 135 tablet 0   • fluticasone (FLONASE) 50 MCG/ACT nasal spray SHAKE LIQUID AND USE 2 SPRAYS IN EACH NOSTRIL DAILY 48 g 1   • gabapentin (NEURONTIN) 300 MG capsule Take 1 capsule by mouth Every Night. 90 capsule 1   • ibuprofen (ADVIL,MOTRIN) 200 MG tablet Take 1 tablet by mouth Every 6 (Six) Hours As Needed for Mild Pain.     • levothyroxine (SYNTHROID, LEVOTHROID) 125 MCG tablet TAKE 1 TABLET BY MOUTH DAILY 90 tablet 1   • loratadine (CLARITIN) 5 MG chewable tablet Chew 1 tablet Daily.     • losartan (COZAAR) 100 MG tablet Take 1 tablet by mouth Daily. 90 tablet 1     No current facility-administered medications for this encounter.       --------------------------------------------------------------------------------  Assessment & Plan      Anesthesia Evaluation           Pain impairs ability to perform ADLs: Ambulation  Modalities previously tried to control pain with limited effectiveness within the last 4-6 weeks: Rest and OTC medications     Airway   Mallampati: II  Neck ROM: limited  Possible difficult intubation  Dental      Pulmonary    (+) ,shortness of breath  (-) wheezes  Cardiovascular     Rhythm: regular    (+) hypertension    ROS comment: She has normal myocardial perfusion scan  Her echo shows good ejection fraction with no valvular disease.  PE comment: Lower extremities are warm without edema..  Posterior tibial pulses are palpable bilaterally    Neuro/Psych  (-) normal  sensory deficit, left straight leg raise test, right straight leg raise test  GI/Hepatic/Renal/Endo    (+) GERD, thyroid problem     ROS Comment: He has not normal GFR    Musculoskeletal         PE comment: Station gait is antalgic.  She ambulates with a walker.  She has some difficulty getting up from a sitting position.  She has moderate pain with tilting from side-to-side.  She has modest pain with rotating from side-to-side.  She has minimal pain with extension.  He has no obvious focal motor deficits in her lower extremities  Abdominal    Substance History      OB/GYN          Other                 Diagnosis and Plan    Treatment Plan  ASA 3      Procedures: With fluoroscopy,      Anesthetic plan and risks discussed with patient.      Is here with her son we spoke about having a trial of medial branch blocks to address her axial back pain.  I feel like she likely has a significant component that is facet agenic.  She has minimal to no radicular symptoms.  She is not responding well to epidural steroid injections.  They are interested in a trial of medial branch blocks to evaluate how much of this pain is coming from her facets.  I would endeavor to block her medial branch at the transverse process of L3, L4, L5 and the sacral ala bilaterally.  She understands this is just a diagnostic procedure that we will only last a few hours.  She understands that the radiofrequency neurotomy can be expected to last for 6 months to 9 months.  She understands that there will be denervation and no curing of the underlying cause.  She understands that the multifidus muscle becomes denervated and causes multifidus atrophy and this is of unknown clinical significance.  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

## 2025-05-09 DIAGNOSIS — F33.9 MONOPOLAR DEPRESSION: Chronic | ICD-10-CM

## 2025-05-09 RX ORDER — ESCITALOPRAM OXALATE 10 MG/1
15 TABLET ORAL DAILY
Qty: 135 TABLET | Refills: 0 | Status: SHIPPED | OUTPATIENT
Start: 2025-05-09

## 2025-05-15 ENCOUNTER — HOSPITAL ENCOUNTER (OUTPATIENT)
Dept: PAIN MEDICINE | Facility: HOSPITAL | Age: 89
Discharge: HOME | End: 2025-05-15
Payer: MEDICARE

## 2025-05-15 ENCOUNTER — ANESTHESIA EVENT (OUTPATIENT)
Dept: PAIN MEDICINE | Facility: HOSPITAL | Age: 89
End: 2025-05-15
Payer: MEDICARE

## 2025-05-15 ENCOUNTER — HOSPITAL ENCOUNTER (OUTPATIENT)
Dept: GENERAL RADIOLOGY | Facility: HOSPITAL | Age: 89
Discharge: HOME | End: 2025-05-15
Payer: MEDICARE

## 2025-05-15 ENCOUNTER — ANESTHESIA (OUTPATIENT)
Dept: PAIN MEDICINE | Facility: HOSPITAL | Age: 89
End: 2025-05-15
Payer: MEDICARE

## 2025-05-15 VITALS
TEMPERATURE: 97.3 F | HEART RATE: 67 BPM | OXYGEN SATURATION: 97 % | SYSTOLIC BLOOD PRESSURE: 153 MMHG | DIASTOLIC BLOOD PRESSURE: 89 MMHG | RESPIRATION RATE: 16 BRPM

## 2025-05-15 DIAGNOSIS — M47.816 SPONDYLOSIS OF LUMBAR REGION WITHOUT MYELOPATHY OR RADICULOPATHY: ICD-10-CM

## 2025-05-15 DIAGNOSIS — R52 PAIN: ICD-10-CM

## 2025-05-15 PROCEDURE — 77003 FLUOROGUIDE FOR SPINE INJECT: CPT

## 2025-05-15 PROCEDURE — 25510000001 IOPAMIDOL 41 % SOLUTION: Performed by: ANESTHESIOLOGY

## 2025-05-15 RX ORDER — BUPIVACAINE HYDROCHLORIDE AND EPINEPHRINE 2.5; 5 MG/ML; UG/ML
30 INJECTION, SOLUTION EPIDURAL; INFILTRATION; INTRACAUDAL; PERINEURAL ONCE
Status: COMPLETED | OUTPATIENT
Start: 2025-05-15 | End: 2025-05-15

## 2025-05-15 RX ORDER — IOPAMIDOL 408 MG/ML
10 INJECTION, SOLUTION INTRATHECAL
Status: COMPLETED | OUTPATIENT
Start: 2025-05-15 | End: 2025-05-15

## 2025-05-15 RX ORDER — LIDOCAINE HYDROCHLORIDE 10 MG/ML
1 INJECTION, SOLUTION INFILTRATION; PERINEURAL ONCE
Status: DISCONTINUED | OUTPATIENT
Start: 2025-05-15 | End: 2025-05-16 | Stop reason: HOSPADM

## 2025-05-15 RX ORDER — MIDAZOLAM HYDROCHLORIDE 1 MG/ML
1 INJECTION, SOLUTION INTRAMUSCULAR; INTRAVENOUS ONCE AS NEEDED
Status: DISCONTINUED | OUTPATIENT
Start: 2025-05-15 | End: 2025-05-16 | Stop reason: HOSPADM

## 2025-05-15 RX ORDER — FENTANYL CITRATE 50 UG/ML
50 INJECTION, SOLUTION INTRAMUSCULAR; INTRAVENOUS ONCE
Status: DISCONTINUED | OUTPATIENT
Start: 2025-05-15 | End: 2025-05-16 | Stop reason: HOSPADM

## 2025-05-15 RX ADMIN — BUPIVACAINE HYDROCHLORIDE AND EPINEPHRINE BITARTRATE 30 ML: 2.5; .005 INJECTION, SOLUTION EPIDURAL; INFILTRATION; INTRACAUDAL; PERINEURAL at 12:59

## 2025-05-15 RX ADMIN — IOPAMIDOL 10 ML: 408 INJECTION, SOLUTION INTRATHECAL at 12:59

## 2025-05-15 NOTE — NURSING NOTE
"Dr. Sullivan at bedside. Got pt up and walked with patient asked when asked patient she stated \"she was a lot better\".  "

## 2025-05-15 NOTE — ANESTHESIA PROCEDURE NOTES
Bilateral medial branch blocks at the transverse process of L3, L4, L5 and the sacral ala    Pre-sedation assessment completed: 5/15/2025 12:57 PM    Patient reassessed immediately prior to procedure    Patient location during procedure: Pain Clinic  Start time: 5/15/2025 12:58 PM  Stop Time: 5/15/2025 1:18 PM    Performed by  Anesthesiologist: Arjun Sullivan MD  Preanesthetic Checklist  Completed: patient identified, IV checked, site marked, risks and benefits discussed, surgical consent, monitors and equipment checked, pre-op evaluation and timeout performed  Prep:  Patient position: prone  Sterile barriers:cap, gloves, mask and sterile barrier  Prep: ChloraPrep  Patient monitoring: blood pressure monitoring, continuous pulse oximetry and EKG  Procedure:  Sedation:no  Approach:bilateral  Guidance:fluoroscopy  Location:lumbar  Interspace: The transverse process of L3, L4, L5 and the sacral ala.  Medications:  Isovue:2  Comment:Medial branch blocks were performed bilaterally.  I initially started the left-sided L3, using a slight paramedian approach I placed the needle tip at the junction of the transverse process and superior articular process.  I then moved caudally down the L4-L5 and the sacral ala.  Lateral imaging confirmed placements were not within the neuroforamen.  I then returned to the AP plane and injected 0.25 mL of contrast to define spread.  After that I then injected 0.25 mL of 0.25% bupivacaine plus epinephrine at each of the 4 levels.  The needles were withdrawn.  I repeated the same procedure on the right side starting at L3 and worked my way caudally to the sacral ala.  Imaging was used in the AP and paramedian plane to place the needles initially.  I then turned to the lateral plane to confirm placement was outside the neuroforamen.  And then I returned back to the AP plane and injected contrast.  After which I injected 0.25 mL of 0.25% bupivacaine plus epinephrine at each of the 4 levels.   The needles were withdrawn.  She tolerated the procedure well.  She will be reevaluated in recovery      In the recovery area she is able to stand up and twist with minimal to no pain.  She says she thinks it feels significantly better.  I spoke with both her and her son about evaluating her functionality and her pain over the next 4 to 6 hours and that will be critical for her to be able to gauge how much this helped.  They will report back tomorrow.    Additional Notes  Diagnosis:  Post-Op Diagnosis Codes:     * Spondylosis without myelopathy or radiculopathy, lumbar region [M47.816]

## 2025-05-15 NOTE — H&P
[facility]    History and Physical    Patient Name: Kajal Severino  :  1935  MRN:  [id number]  Date of Admission: 5/15/2025    Subjective     Patient is a 89 y.o. female presents with chief complaint of chronic low back pain.  Onset of symptoms was gradual starting unknown years ago.  Symptoms are associated/aggravated by nothing in particular, activity, or twisting. Symptoms improve with nothing    The following portions of the patients history were reviewed and updated as appropriate: current medications, allergies, past medical history, past surgical history, past family history, past social history, and problem list      I saw her in  in preparation for medial branch blocks with anticipated lumbar radiofrequency neurotomies.  She has primarily axial pain with radiation perhaps to her waist.  She does not have much in the way of radicular pain down her legs.  She has had epidural steroid injections with minimal relief of her discomfort.  She has MRI from  which shows moderate facet disease bilaterally at L3-4 severe facet disease at L4-5 and advanced facet disease at L5-S1. She also has some foraminal stenosis and degenerative disc disease as well.       Generally speaking she has difficulty ambulating and has to walk very slowly.  Activity exacerbates her pain, she says that early in the day she has a lot of pain which loosens up a bit if she goes long.  Walking in from the parking lot was somewhat problematic for her today.    Objective     Past Medical History:   Past Medical History:   Diagnosis Date    Allergic rhinitis     Ankle sprain     Anxiety     Arthritis of back     Arthritis of neck     Back pain, lumbosacral     Colon polyp     Depression     Diverticulosis     Erythrocytosis     Fibrocystic breast     Fracture of ankle     Fracture, foot     Frozen shoulder     GERD (gastroesophageal reflux disease)     H/O bone density study 2014    Headache     Hip arthrosis      History of anemia     History of chest pain     History of colonic polyps     History of prior pregnancies     x7    Hyperlipidemia     Hypertension     Hypothyroidism     Intervertebral disc stenosis of neural canal of lumbar region 2022    Knee swelling     Low back pain     Osteoarthritis     Osteopenia     Osteoporosis     Periarthritis of shoulder     Peripheral neuropathy     Potassium deficiency     Scoliosis     Shortness of breath      Past Surgical History:   Past Surgical History:   Procedure Laterality Date    BREAST BIOPSY Bilateral 1980    x3     SECTION N/A     x1    COLONOSCOPY N/A 10/05/2012    + Diverticulosis and polyps    EPIDURAL BLOCK      PAP SMEAR N/A 11/15/2006    TONSILLECTOMY      TRIGGER POINT INJECTION      Epidurals     Family History:   Family History   Problem Relation Age of Onset    Heart disease Mother     Hypertension Mother     Diabetes Father     Heart disease Father     Hypertension Father     Breast cancer Sister     Leukemia Brother         CML    Breast cancer Daughter     Hypertension Daughter     Thyroid disease Daughter     Colon cancer Brother     Prostate cancer Brother     Prostate cancer Brother     Kidney cancer Brother     Hypertension Daughter     Hypertension Son      Social History:   Social History     Socioeconomic History    Marital status:     Number of children: 4    Years of education: High School   Tobacco Use    Smoking status: Former     Current packs/day: 0.00     Average packs/day: 0.3 packs/day for 17.3 years (4.5 ttl pk-yrs)     Types: Cigarettes     Start date: 1980     Quit date: 1995     Years since quittin.3     Passive exposure: Past    Smokeless tobacco: Never   Vaping Use    Vaping status: Never Used   Substance and Sexual Activity    Alcohol use: No    Drug use: No    Sexual activity: Not Currently     Partners: Male       Vital Signs Range for the last 24 hours  Temperature: Temp:  [36.3 °C (97.3 °F)] 36.3  °C (97.3 °F)   Temp Source: Temp src: Infrared   BP: BP: (140)/(81) 140/81   Pulse: Heart Rate:  [91] 91   Respirations: Resp:  [14] 14   SPO2: SpO2:  [95 %] 95 %   O2 Amount (l/min):     O2 Devices Device (Oxygen Therapy): room air   Weight:           --------------------------------------------------------------------------------    Current Outpatient Medications   Medication Sig Dispense Refill    acetaminophen (TYLENOL) 650 MG 8 hr tablet Take 1 tablet by mouth Every 8 (Eight) Hours As Needed for Mild Pain.      escitalopram (LEXAPRO) 10 MG tablet TAKE 1 AND 1/2 TABLETS BY MOUTH DAILY 135 tablet 0    fluticasone (FLONASE) 50 MCG/ACT nasal spray SHAKE LIQUID AND USE 2 SPRAYS IN EACH NOSTRIL DAILY 48 g 1    gabapentin (NEURONTIN) 300 MG capsule Take 1 capsule by mouth Every Night. 90 capsule 1    ibuprofen (ADVIL,MOTRIN) 200 MG tablet Take 1 tablet by mouth Every 6 (Six) Hours As Needed for Mild Pain.      levothyroxine (SYNTHROID, LEVOTHROID) 125 MCG tablet TAKE 1 TABLET BY MOUTH DAILY 90 tablet 1    losartan (COZAAR) 100 MG tablet Take 1 tablet by mouth Daily. 90 tablet 1    loratadine (CLARITIN) 5 MG chewable tablet Chew 1 tablet Daily.       Current Facility-Administered Medications   Medication Dose Route Frequency Provider Last Rate Last Admin    bupivacaine-EPINEPHrine PF (MARCAINE w/EPI) 0.25% -[id number] injection 30 mL  30 mL Infiltration Once Arjun Sullivan MD        fentaNYL citrate (PF) (SUBLIMAZE) injection 50 mcg  50 mcg Intravenous Once RenderArjun MD        iopamidol (ISOVUE-M 200) injection 41%  10 mL Epidural Once in imaging Arjun Sullivan MD        lidocaine (XYLOCAINE) 1 % injection 1 mL  1 mL Intradermal Once RenderArjun MD        midazolam (VERSED) injection 1 mg  1 mg Intravenous Once PRN Arjun Sullivan MD           --------------------------------------------------------------------------------  Assessment & Plan      Anesthesia Evaluation           Pain impairs  ability to perform ADLs: Ambulation  Modalities previously tried to control pain with limited effectiveness within the last 4-6 weeks: Rest and OTC medications     Airway   Mallampati: II  TM distance: >3 FB  Neck ROM: full  Dental      Pulmonary    (+) ,shortness of breath  (-) wheezes  Cardiovascular     Rhythm: regular    (+) hypertension, hyperlipidemia      Neuro/Psych  (+) headaches  (-) normal sensory deficit, left straight leg raise test, right straight leg raise test  GI/Hepatic/Renal/Endo    (+) GERD, thyroid problem     Musculoskeletal         PE comment: There is no obvious focal motor weakness.  She says ambulating causes her the most difficulty.  Twisting and tilting causes her minimal pain today  Abdominal    Substance History      OB/GYN          Other               Diagnosis and Plan    Treatment Plan  ASA 3      Procedures: Nerve block type: Diagnostic medial branch blocks from L3 to the sacral ala bilaterally., With fluoroscopy,      Anesthetic plan and risks discussed with patient.      I discussed with her and her son they will need to pay close attention to her pain levels and functionality for the next 4 to 6 hours after we finished the procedure today in order to assess the efficacy of this procedure.  She does not have specifically a lot of pain with maneuvers that I perform in her physical exam  Diagnosis     * Spondylosis without myelopathy or radiculopathy, lumbar region [M47.816]

## 2025-05-16 ENCOUNTER — TELEPHONE (OUTPATIENT)
Dept: PAIN MEDICINE | Facility: HOSPITAL | Age: 89
End: 2025-05-16
Payer: MEDICARE

## 2025-05-16 NOTE — TELEPHONE ENCOUNTER
On the day of your procedure are you comfortable saying that your pain in the area that was treated was relieved temporarily 80% or more?  Yes or No      YES  80% RELIEF FOR 8 HOURS

## 2025-06-19 ENCOUNTER — HOSPITAL ENCOUNTER (OUTPATIENT)
Dept: PAIN MEDICINE | Facility: HOSPITAL | Age: 89
Discharge: HOME | End: 2025-06-19
Payer: MEDICARE

## 2025-06-19 ENCOUNTER — ANESTHESIA (OUTPATIENT)
Dept: PAIN MEDICINE | Facility: HOSPITAL | Age: 89
End: 2025-06-19
Payer: MEDICARE

## 2025-06-19 ENCOUNTER — ANESTHESIA EVENT (OUTPATIENT)
Dept: PAIN MEDICINE | Facility: HOSPITAL | Age: 89
End: 2025-06-19
Payer: MEDICARE

## 2025-06-19 ENCOUNTER — HOSPITAL ENCOUNTER (OUTPATIENT)
Dept: GENERAL RADIOLOGY | Facility: HOSPITAL | Age: 89
Discharge: HOME | End: 2025-06-19
Payer: MEDICARE

## 2025-06-19 VITALS
HEART RATE: 69 BPM | RESPIRATION RATE: 14 BRPM | OXYGEN SATURATION: 94 % | DIASTOLIC BLOOD PRESSURE: 81 MMHG | SYSTOLIC BLOOD PRESSURE: 129 MMHG | TEMPERATURE: 97.1 F

## 2025-06-19 DIAGNOSIS — R52 PAIN: ICD-10-CM

## 2025-06-19 DIAGNOSIS — M47.816 SPONDYLOSIS OF LUMBAR REGION WITHOUT MYELOPATHY OR RADICULOPATHY: ICD-10-CM

## 2025-06-19 PROCEDURE — 25510000001 IOPAMIDOL 41 % SOLUTION: Performed by: ANESTHESIOLOGY

## 2025-06-19 PROCEDURE — 77003 FLUOROGUIDE FOR SPINE INJECT: CPT

## 2025-06-19 RX ORDER — BUPIVACAINE HYDROCHLORIDE 2.5 MG/ML
10 INJECTION, SOLUTION EPIDURAL; INFILTRATION; INTRACAUDAL; PERINEURAL ONCE
Status: DISCONTINUED | OUTPATIENT
Start: 2025-06-19 | End: 2025-06-20 | Stop reason: HOSPADM

## 2025-06-19 RX ORDER — MIDAZOLAM HYDROCHLORIDE 1 MG/ML
1 INJECTION, SOLUTION INTRAMUSCULAR; INTRAVENOUS ONCE AS NEEDED
Status: DISCONTINUED | OUTPATIENT
Start: 2025-06-19 | End: 2025-06-20 | Stop reason: HOSPADM

## 2025-06-19 RX ORDER — FENTANYL CITRATE 50 UG/ML
50 INJECTION, SOLUTION INTRAMUSCULAR; INTRAVENOUS ONCE
Status: DISCONTINUED | OUTPATIENT
Start: 2025-06-19 | End: 2025-06-20 | Stop reason: HOSPADM

## 2025-06-19 RX ORDER — LIDOCAINE HYDROCHLORIDE 10 MG/ML
1 INJECTION, SOLUTION INFILTRATION; PERINEURAL ONCE
Status: DISCONTINUED | OUTPATIENT
Start: 2025-06-19 | End: 2025-06-20 | Stop reason: HOSPADM

## 2025-06-19 RX ORDER — BUPIVACAINE HYDROCHLORIDE AND EPINEPHRINE 2.5; 5 MG/ML; UG/ML
30 INJECTION, SOLUTION EPIDURAL; INFILTRATION; INTRACAUDAL; PERINEURAL ONCE
Status: COMPLETED | OUTPATIENT
Start: 2025-06-19 | End: 2025-06-19

## 2025-06-19 RX ORDER — IOPAMIDOL 408 MG/ML
10 INJECTION, SOLUTION INTRATHECAL
Status: COMPLETED | OUTPATIENT
Start: 2025-06-19 | End: 2025-06-19

## 2025-06-19 RX ADMIN — BUPIVACAINE HYDROCHLORIDE AND EPINEPHRINE BITARTRATE 30 ML: 2.5; .005 INJECTION, SOLUTION EPIDURAL; INFILTRATION; INTRACAUDAL; PERINEURAL at 13:07

## 2025-06-19 RX ADMIN — IOPAMIDOL 10 ML: 408 INJECTION, SOLUTION INTRATHECAL at 13:07

## 2025-06-19 NOTE — ANESTHESIA PROCEDURE NOTES
Bilateral medial branch blocks from L3 to the sacral ala      Patient reassessed immediately prior to procedure    Patient location during procedure: Pain Clinic    Performed by  Anesthesiologist: Arjun Sullivan MD  Preanesthetic Checklist  Completed: patient identified, site marked, risks and benefits discussed, surgical consent, monitors and equipment checked, pre-op evaluation and timeout performed  Prep:  Patient position: prone  Sterile barriers:gloves, mask and sterile barrier  Prep: ChloraPrep  Patient monitoring: continuous pulse oximetry, blood pressure monitoring and EKG  Procedure:  Sedation:no  Approach:bilateral  Location:lumbar  Interspace: At the transverse process of L3, L4, L5 and the sacral ala.  Needle Gauge:25 G  Medications:  Isovue:2  Comment:Bilateral medial branch blocks were performed to the transverse processes of L3, L4, L5 and the sacral ala.  I started on the left side at the L3 level.  Utilizing AP paramedian and lateral fluoroscopy I placed the needle tips at the junction of the transverse process and the superior articular process.  Lateral fluoroscopy was used to confirm that the needle tip was outside of the neuroforamen.  After that I returned to the AP plane and injected 0.25 mL of contrast to confirm spread.  I then injected 0.25 mL of 0.25% bupivacaine plus epinephrine.  The needles were withdrawn and then I returned to the right side starting at the L3 transverse process and worked my way down to the sacral ala.  The exact same process was performed.  Lateral fluoroscopy demonstrated the needle tips were outside of the neuroforamen.  I then returned to AP fluoroscopy and injected 0.25 mL of contrast and then injected 0.25 mL of 0.25% bupivacaine plus epinephrine.  She tolerated the procedure well and will be reevaluated in the recovery area.    Post Assessment  Patient Tolerance:comfortable throughout block  Complications:no  Additional Notes  Diagnosis:  Post-Op Diagnosis  Codes:     * Lumbar spondylosis [M47.816]    She did well and if she has the same response she had previously 2 medial branch blocks we will plan on doing radiofrequency neurotomy bilaterally at the transverse processes of L3, L4, L5 and the sacral ala

## 2025-06-19 NOTE — H&P
[facility]    History and Physical    Patient Name: Kajal Severino  :  1935  MRN:  [id number]  Date of Admission: 2025    Subjective     Patient is a 89 y.o. female presents with chief complaint of chronic low back pain.  Onset of symptoms was gradual starting unknown years ago.  Symptoms are associated/aggravated by activity. Symptoms improve with rest    The following portions of the patients history were reviewed and updated as appropriate: current medications, allergies, past medical history, past surgical history, past family history, past social history, and problem list    Low back pain with minimal radicular symptoms.  She had a previous trial of medial branch blocks from L3 to the sacral ala bilaterally.  She reports that she had about 80 to 90% relief of her discomfort and had significant increase in her functionality for about 8 hours after the procedure.  She says that in general activity and movement seem to exacerbate her pain.  Is difficult to specifically identify a movement that is painful for her however flexing forward seems to be the most uncomfortable thing for her as well as just ambulating.  She is here with her son who said that for about 8 hours after the last procedure she was able to move about with minimal to no pain and ambulate with much less discomfort.  The pain did come back as bad as it ever was in a timeframe that was compatible with that of bupivacaine.    She is here for repeat medial branch blocks.  She understands if she gets good relief of her pain we can proceed with radiofrequency neurotomy.  I again spoke with them about the treatment and that it is not permanent and they can expect to get 6 months to 9 months of good pain relief from it and then the pain may start coming back.  She understands they may not get as complete of a neurotomy with the radiofrequency of treatment as they do with local anesthetics and that the pain relief may be suboptimal despite  having good medial branch block response.  So understand that there is likely to be multifidus muscle dysfunction that is of unknown clinical significance            Objective     Past Medical History:   Past Medical History:   Diagnosis Date   • Allergic rhinitis    • Ankle sprain    • Anxiety    • Arthritis of back    • Arthritis of neck    • Back pain, lumbosacral    • Colon polyp    • Depression    • Diverticulosis    • Erythrocytosis    • Fibrocystic breast    • Fracture of ankle    • Fracture, foot    • Frozen shoulder    • GERD (gastroesophageal reflux disease)    • H/O bone density study 2014   • Headache    • Hip arthrosis    • History of anemia    • History of chest pain    • History of colonic polyps    • History of prior pregnancies     x7   • Hyperlipidemia    • Hypertension    • Hypothyroidism    • Intervertebral disc stenosis of neural canal of lumbar region 2022   • Knee swelling    • Low back pain    • Osteoarthritis    • Osteopenia    • Osteoporosis    • Periarthritis of shoulder    • Peripheral neuropathy    • Potassium deficiency    • Scoliosis    • Shortness of breath      Past Surgical History:   Past Surgical History:   Procedure Laterality Date   • BREAST BIOPSY Bilateral 1980    x3   •  SECTION N/A     x1   • COLONOSCOPY N/A 10/05/2012    + Diverticulosis and polyps   • EPIDURAL BLOCK     • MEDIAL BRANCH BLOCK Bilateral 05/15/2025   • PAP SMEAR N/A 11/15/2006   • TONSILLECTOMY     • TRIGGER POINT INJECTION      Epidurals     Family History:   Family History   Problem Relation Age of Onset   • Heart disease Mother    • Hypertension Mother    • Diabetes Father    • Heart disease Father    • Hypertension Father    • Breast cancer Sister    • Leukemia Brother         CML   • Breast cancer Daughter    • Hypertension Daughter    • Thyroid disease Daughter    • Colon cancer Brother    • Prostate cancer Brother    • Prostate cancer Brother    • Kidney cancer Brother    •  Hypertension Daughter    • Hypertension Son      Social History:   Social History     Socioeconomic History   • Marital status:    • Number of children: 4   • Years of education: High School   Tobacco Use   • Smoking status: Former     Current packs/day: 0.00     Average packs/day: 0.3 packs/day for 17.3 years (4.5 ttl pk-yrs)     Types: Cigarettes     Start date: 1980     Quit date: 1995     Years since quittin.4     Passive exposure: Past   • Smokeless tobacco: Never   Vaping Use   • Vaping status: Never Used   Substance and Sexual Activity   • Alcohol use: No   • Drug use: No   • Sexual activity: Not Currently     Partners: Male       Vital Signs Range for the last 24 hours  Temperature: Temp:  [36.2 °C (97.1 °F)] 36.2 °C (97.1 °F)   Temp Source: Temp src: Infrared   BP: BP: (142)/(79) 142/79   Pulse: Heart Rate:  [74] 74   Respirations: Resp:  [16] 16   SPO2: SpO2:  [93 %] 93 %   O2 Amount (l/min):     O2 Devices Device (Oxygen Therapy): room air   Weight:           --------------------------------------------------------------------------------    Current Outpatient Medications   Medication Sig Dispense Refill   • acetaminophen (TYLENOL) 650 MG 8 hr tablet Take 1 tablet by mouth Every 8 (Eight) Hours As Needed for Mild Pain.     • escitalopram (LEXAPRO) 10 MG tablet TAKE 1 AND 1/2 TABLETS BY MOUTH DAILY 135 tablet 0   • fluticasone (FLONASE) 50 MCG/ACT nasal spray SHAKE LIQUID AND USE 2 SPRAYS IN EACH NOSTRIL DAILY 48 g 1   • gabapentin (NEURONTIN) 300 MG capsule Take 1 capsule by mouth Every Night. 90 capsule 1   • levothyroxine (SYNTHROID, LEVOTHROID) 125 MCG tablet TAKE 1 TABLET BY MOUTH DAILY 90 tablet 1   • loratadine (CLARITIN) 5 MG chewable tablet Chew 1 tablet Daily.     • losartan (COZAAR) 100 MG tablet Take 1 tablet by mouth Daily. 90 tablet 1   • ibuprofen (ADVIL,MOTRIN) 200 MG tablet Take 1 tablet by mouth Every 6 (Six) Hours As Needed for Mild Pain.       No current  facility-administered medications for this encounter.       --------------------------------------------------------------------------------  Assessment & Plan      Anesthesia Evaluation                  Airway   Mallampati: II  Dental      Pulmonary    (-) wheezes  Cardiovascular     Rhythm: regular        Neuro/Psych  GI/Hepatic/Renal/Endo      Musculoskeletal     Abdominal    Substance History      OB/GYN          Other                 Diagnosis and Plan    Treatment Plan  ASA 3   Patient has had previous injection/procedure with % improvement.   Procedures: Nerve block type: Bilateral medial branch blocks from L3 to the sacral ala., With fluoroscopy,      Anesthetic plan and risks discussed with patient.      Diagnosis     * Lumbar spondylosis [M47.816]

## 2025-06-20 ENCOUNTER — TELEPHONE (OUTPATIENT)
Dept: PAIN MEDICINE | Facility: HOSPITAL | Age: 89
End: 2025-06-20
Payer: MEDICARE

## 2025-06-20 NOTE — TELEPHONE ENCOUNTER
TREATMENT AND DATE:  BERTA #2  6/19/25    At it's best, what percentage of pain relief did you receive after this treatment (0-100%):  70%  How long did you maintain this level of relief?    3 HOURS                        Pain rating on 0-10 scale  Prior to your last procedure:  9  At it's best following your last procedure:    Currently: 3        FOR REPEAT RFA:   Did you receive at least 50% relief for 6 months?

## 2025-06-30 ENCOUNTER — TELEPHONE (OUTPATIENT)
Dept: PAIN MEDICINE | Facility: HOSPITAL | Age: 89
End: 2025-06-30
Payer: MEDICARE

## 2025-06-30 ENCOUNTER — TELEPHONE (OUTPATIENT)
Dept: INTERNAL MEDICINE | Facility: CLINIC | Age: 89
End: 2025-06-30
Payer: MEDICARE

## 2025-06-30 NOTE — TELEPHONE ENCOUNTER
Caller: Travon Rider    Relationship: Emergency Contact    Best call back number: [phone]    Who are you requesting to speak with (clinical staff, provider,  specific staff member): CLINICAL STAFF    What was the call regarding: STATED THAT THEY ARE NEEDING TO SEE IF THEY NEED TO BRING THE PATIENT IN FOR AN APPOINTMENT GIVEN THE DETAILS IN THEIR [employer] MESSAGE. STATED THAT THEY WOULD LIKE TO KNOW SOMETHING AS SOON AS THEY CAN. PLEASE REACH OUT AND ADVISE     Is it okay if the provider responds through [facility]: YES

## 2025-06-30 NOTE — TELEPHONE ENCOUNTER
TREATMENT AND DATE: MBB #2 6/19/2025      At it's best, what percentage of pain relief did you receive after this treatment (0-100%):    How long did you maintain this level of relief?   S/W pt's daughter and POAKeena, who states pt had difficulty answering this question in percent amount of relief. Pt states 80% relief for 8 hours after MBB #1 5/15/2025 and daughter states she had the same relief after MBB but it did not last as long as 8 hours.                        Pain rating on 0-10 scale  Prior to your last procedure:    At it's best following your last procedure:    Currently:        FOR REPEAT RFA:   Did you receive at least 50% relief for 6 months?

## 2025-07-01 ENCOUNTER — HOSPITAL ENCOUNTER (OUTPATIENT)
Facility: HOSPITAL | Age: 89
Discharge: HOME | End: 2025-07-01
Payer: MEDICARE

## 2025-07-01 ENCOUNTER — OFFICE VISIT (OUTPATIENT)
Dept: INTERNAL MEDICINE | Facility: CLINIC | Age: 89
End: 2025-07-01
Payer: MEDICARE

## 2025-07-01 VITALS
SYSTOLIC BLOOD PRESSURE: 142 MMHG | DIASTOLIC BLOOD PRESSURE: 78 MMHG | HEIGHT: 62 IN | TEMPERATURE: 97.9 F | BODY MASS INDEX: 25.24 KG/M2 | OXYGEN SATURATION: 99 % | HEART RATE: 64 BPM
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
## 2025-07-01 DIAGNOSIS — M25.572 ACUTE LEFT ANKLE PAIN: Primary | ICD-10-CM

## 2025-07-01 DIAGNOSIS — S82.65XA CLOSED NONDISPLACED FRACTURE OF LATERAL MALLEOLUS OF LEFT FIBULA, INITIAL ENCOUNTER: ICD-10-CM

## 2025-07-01 PROCEDURE — 1160F RVW MEDS BY RX/DR IN RCRD: CPT | Performed by: NURSE PRACTITIONER

## 2025-07-01 PROCEDURE — 99213 OFFICE O/P EST LOW 20 MIN: CPT | Performed by: NURSE PRACTITIONER

## 2025-07-01 PROCEDURE — G2211 COMPLEX E/M VISIT ADD ON: HCPCS | Performed by: NURSE PRACTITIONER

## 2025-07-01 PROCEDURE — 73610 X-RAY EXAM OF ANKLE: CPT

## 2025-07-01 PROCEDURE — 1125F AMNT PAIN NOTED PAIN PRSNT: CPT | Performed by: NURSE PRACTITIONER

## 2025-07-01 PROCEDURE — 1159F MED LIST DOCD IN RCRD: CPT | Performed by: NURSE PRACTITIONER

## 2025-07-01 PROCEDURE — 73630 X-RAY EXAM OF FOOT: CPT

## 2025-07-07 ENCOUNTER — RESULTS FOLLOW-UP (OUTPATIENT)
Dept: INTERNAL MEDICINE | Facility: CLINIC | Age: 89
End: 2025-07-07
Payer: MEDICARE

## 2025-07-07 NOTE — PROGRESS NOTES
"Chief Complaint  Ankle Pain (About a week ago patient rolled her ankle//Walked ability has decreased over time//Alternating ibuprofen and tylenol)  Subjective        Kajal Severino presents to [facility]  Ankle Pain   Pertinent negatives include no numbness or tingling.   Extremity Pain  Injury: yes    Date of trauma:  6/22/2025  Progression since onset:  Worse  Injury location:  At home  Injury mechanism:  A fall  Foreign body present:  No foreign bodies  Pain location:  Right foot  Pain quality:  Other  Pain - numeric:  8/10  Associated symptoms: lower extremity swelling and redness    Associated symptoms: no numbness and no tingling    Additional information:  Pain, swelling, and bruising have been very bad for 8 days    History of Present Illness  The patient presents for evaluation of a left ankle injury. She presents with her son and daughter whom she has given permission to be present.    Eight days ago, she tripped over a rocking chair leg, resulting in a fall and left ankle injury. Despite bruising and swelling, she could bear weight on the left ankle and foot. Swelling and discoloration improved until three days ago when pain intensified and swelling increased. She uses a boot intermittently and sleeps without discomfort. Walking exacerbates pain. Manages pain with ibuprofen and Tylenol.     No dizziness, lightheadedness, or balance issues at the time of the fall. Appetite remains good. Denies other injuries during fall, no previous falls.    She does continue to c/o low back pain which has been chronic. Waiting for insurance approval for an ablation procedure for back pain, scheduled for 07/07/2025.     Objective   Vital Signs:  /78 (BP Location: Left arm, Patient Position: Sitting, Cuff Size: Adult)   Pulse 64   Temp 97.9 °F (36.6 °C)   Ht 157.5 cm (62\")   SpO2 99%   BMI 25.24 kg/m²   Estimated body mass index is 25.24 kg/m² as calculated from the following:    " "Height as of this encounter: 157.5 cm (62\").    Weight as of 3/12/25: 62.6 kg (138 lb).            Physical Exam  Constitutional:       Appearance: She is well-developed. She is not ill-appearing.   HENT:      Head: Normocephalic.      Right Ear: Hearing, tympanic membrane and external ear normal.      Left Ear: Hearing, tympanic membrane and external ear normal.      Nose: Nose normal. No nasal deformity, mucosal edema or rhinorrhea.      Right Sinus: No maxillary sinus tenderness or frontal sinus tenderness.      Left Sinus: No maxillary sinus tenderness or frontal sinus tenderness.      Mouth/Throat:      Dentition: Normal dentition.   Eyes:      General: Lids are normal.         Right eye: No discharge.         Left eye: No discharge.      Conjunctiva/sclera: Conjunctivae normal.      Right eye: No exudate.     Left eye: No exudate.  Neck:      Thyroid: No thyroid mass or thyromegaly.      Vascular: No carotid bruit.      Trachea: Trachea normal.   Cardiovascular:      Rate and Rhythm: Regular rhythm.      Pulses: Normal pulses.      Heart sounds: Normal heart sounds. No murmur heard.  Pulmonary:      Effort: No respiratory distress.      Breath sounds: Normal breath sounds. No decreased breath sounds, wheezing, rhonchi or rales.   Abdominal:      General: Bowel sounds are normal.      Palpations: Abdomen is soft.      Tenderness: There is no abdominal tenderness.   Musculoskeletal:      Cervical back: Normal range of motion. No edema.      Left ankle: Swelling present. No tenderness. Normal range of motion.      Comments: Pt is in a wheelchair for assistance  There is soft tissue swelling in the left ankle   Lymphadenopathy:      Head:      Right side of head: No submental, submandibular, tonsillar, preauricular, posterior auricular or occipital adenopathy.      Left side of head: No submental, submandibular, tonsillar, preauricular, posterior auricular or occipital adenopathy.   Skin:     General: Skin is warm " and dry.      Nails: There is no clubbing.   Neurological:      Mental Status: She is alert.   Psychiatric:         Behavior: Behavior is cooperative.            Result Review :  The following data was reviewed by: HAILY Pisano on 07/01/2025:  Common labs          11/8/2024    11:42   Common Labs   Glucose 70    BUN 20    Creatinine 0.67    Sodium 140    Potassium 4.6    Chloride 103    Calcium 9.6    Albumin 4.2    Total Bilirubin 0.3    Alkaline Phosphatase 71    AST (SGOT) 18    ALT (SGPT) 14           Results  Laboratory Studies  Normal kidney function in November 2024.             Assessment and Plan   Diagnoses and all orders for this visit:    1. Acute left ankle pain (Primary)  -     XR Foot 3+ View Left  -     XR Ankle 3+ View Left    2. Fall, initial encounter    3. Closed nondisplaced fracture of lateral malleolus of left fibula, initial encounter  -     Ambulatory Referral to Orthopedic Surgery      Assessment & Plan  Left ankle injury.  Possibly severe sprain, but fracture not ruled out without imaging. Ordered x-ray of left ankle and foot. Advise elevation and ice to reduce inflammation. Avoid weightbearing until results received. Ibuprofen up to three times daily (reviewed labs from 11/2024-kidney function stable but discussed need for caution, max six tablets, with food. Tylenol for pain management. Blood work on 07/14/2025.           Follow Up   Return if symptoms worsen or fail to improve, for Next scheduled follow up.  Patient was given instructions and counseling regarding her condition or for health maintenance advice. Please see specific information pulled into the AVS if appropriate.     Patient or patient representative verbalized consent for the use of Ambient Listening during the visit with  HAILY Pisano for chart documentation. 7/7/2025  07:17 EDT

## 2025-07-10 ENCOUNTER — OFFICE VISIT (OUTPATIENT)
Dept: ORTHOPEDIC SURGERY | Facility: CLINIC | Age: 89
End: 2025-07-10
Payer: MEDICARE

## 2025-07-10 VITALS — WEIGHT: 138 LBS | BODY MASS INDEX: 25.4 KG/M2 | HEIGHT: 62 IN | TEMPERATURE: 97.1 F

## 2025-07-10 DIAGNOSIS — S82.65XA CLOSED NONDISPLACED FRACTURE OF LATERAL MALLEOLUS OF LEFT FIBULA, INITIAL ENCOUNTER: Primary | ICD-10-CM

## 2025-07-10 NOTE — PROGRESS NOTES
New Patient Encounter      Patient: Kajal Severino  YOB: 1935 89 y.o. female  Medical Record Number: [id number]    Chief Complaints: I hurt my ankle    History of Present Illness:    Patient and her son are seen today reporting that she injured her left ankle about 2 weeks ago when she tripped over the leg of a rocking chair getting out of it.  She had no prior history of injury to the ankle.  She had some pain and swelling with difficulty walking and subsequently saw her PCP on 7/1/2025 with x-rays showing a distal fibula avulsion type fracture.  She reports mild pain in the lateral aspect of the left ankle    HPI    Allergies: No Known Allergies    Medications:   Current Outpatient Medications on File Prior to Visit   Medication Sig    acetaminophen (TYLENOL) 650 MG 8 hr tablet Take 1 tablet by mouth Every 8 (Eight) Hours As Needed for Mild Pain.    escitalopram (LEXAPRO) 10 MG tablet TAKE 1 AND 1/2 TABLETS BY MOUTH DAILY    fluticasone (FLONASE) 50 MCG/ACT nasal spray SHAKE LIQUID AND USE 2 SPRAYS IN EACH NOSTRIL DAILY    gabapentin (NEURONTIN) 300 MG capsule Take 1 capsule by mouth Every Night.    ibuprofen (ADVIL,MOTRIN) 200 MG tablet Take 1 tablet by mouth Every 6 (Six) Hours As Needed for Mild Pain.    levothyroxine (SYNTHROID, LEVOTHROID) 125 MCG tablet TAKE 1 TABLET BY MOUTH DAILY    loratadine (CLARITIN) 5 MG chewable tablet Chew 1 tablet Daily.    losartan (COZAAR) 100 MG tablet Take 1 tablet by mouth Daily.     No current facility-administered medications on file prior to visit.       Past Medical History:   Diagnosis Date    Allergic rhinitis     Ankle sprain     Anxiety     Arthritis of back     Arthritis of neck     Back pain, lumbosacral     Colon polyp     Depression     Diverticulosis     Erythrocytosis     Fibrocystic breast     Fracture of ankle     Fracture, fibula 6/29/25    twisted ankle getting out of chair, fell    Fracture, foot     Frozen shoulder     GERD (gastroesophageal  reflux disease)     H/O bone density study 2014    Headache     Hip arthrosis     History of anemia     History of chest pain     History of colonic polyps     History of prior pregnancies     x7    Hyperlipidemia     Hypertension     Hypothyroidism     Intervertebral disc stenosis of neural canal of lumbar region 2022    Knee swelling     Low back pain     Low back strain     Lumbosacral disc disease     Osteoarthritis     Osteopenia     Osteoporosis     Periarthritis of shoulder     Peripheral neuropathy     Potassium deficiency     Scoliosis     Shortness of breath      Past Surgical History:   Procedure Laterality Date    BREAST BIOPSY Bilateral 1980    x3     SECTION N/A     x1    COLONOSCOPY N/A 10/05/2012    + Diverticulosis and polyps    EPIDURAL BLOCK      MEDIAL BRANCH BLOCK Bilateral 05/15/2025    PAP SMEAR N/A 11/15/2006    TONSILLECTOMY      TRIGGER POINT INJECTION      Epidurals     Social History     Occupational History     Employer: RETIRED   Tobacco Use    Smoking status: Former     Current packs/day: 0.00     Average packs/day: 0.3 packs/day for 19.9 years (5.2 ttl pk-yrs)     Types: Cigarettes     Start date: 1980     Quit date: 1995     Years since quittin.5     Passive exposure: Past    Smokeless tobacco: Never   Vaping Use    Vaping status: Never Used   Substance and Sexual Activity    Alcohol use: No    Drug use: No    Sexual activity: Not Currently     Partners: Male      Social History     Social History Narrative    Not on file     Family History   Problem Relation Age of Onset    Heart disease Mother     Hypertension Mother     Diabetes Father     Heart disease Father     Hypertension Father     Breast cancer Sister     Leukemia Brother         CML    Breast cancer Daughter     Hypertension Daughter     Thyroid disease Daughter     Colon cancer Brother     Prostate cancer Brother     Prostate cancer Brother     Kidney cancer Brother     Hypertension  "Daughter     Hypertension Son        Review of Systems: 14 point review of systems performed, positive pertinent findings identified in HPI. All remaining systems negative     Review of Systems      Physical Exam:   Vitals:    07/10/25 1029   Temp: 97.1 °F (36.2 °C)   Weight: 62.6 kg (138 lb)   Height: 157.5 cm (62\")   PainSc: 8      Physical Exam   Constitutional: pleasant, well developed   Eyes: sclera non icteric  Hearing : adequate for exam  Cardiovascular: palpable pulses in left foot, left calf/ thigh NT without sign of DVT  Respiratoy: breathing unlabored   Neurological: grossly sensate to LT throughout left LE  Psychiatric: oriented with normal mood and affect.   Lymphatic: No palpable popliteal lymphadenopathy left LE  Skin: intact throughout left leg/foot  Musculoskeletal: She is with her son.  She has mild tenderness palpation of the distal aspect of the left fibula and anterolateral ligamentous structures with minimal if any tenderness on the peroneal tendons nontender over the medial ankle or dorsal midfoot.  Physical Exam  Ortho Exam    Radiology: 3 views left ankle ordered evaluate fracture reviewed and compared to previous x-rays in the [facility] from 7/1/2025 of the left foot and ankle previous x-rays of the foot had shown significant arthritis with hallux valgus of the first metatarsal phalangeal joint no obvious midfoot injury.  Ankle x-rays had shown avulsion fracture of the distal fibula without gross malalignment of the talus within the mortise or widening of the syndesmosis.    Today's x-rays show no change in alignment with comminuted fracture of the distal aspect of the fibula but no gross malalignment of the talus within the mortise.    Assessment/Plan: 1.  Left ankle anterolaterally commended sprain with distal fibular avulsion fracture with peroneal tendinitis.  We reviewed treatment going forward and I do not see anything that would prompt further workup or surgical treatment at " this time understanding that should this fail to heal in the symptomatic could require surgical treatment.    We discussed immobilization in a boot would be fairly cumbersome for her with high risk of fall so we will have her use an ASO brace and may sleep in an air stirrup brace.  She use a walker which she has.  She was counseled avoid anti-inflammatories other than Tylenol so to minimize bone healing in addition    Had a very pleasant conversation her son was previous in the [employer] working in [employer].    Will see her back in 3 weeks x-rays of her left ankle.

## 2025-07-14 ENCOUNTER — OFFICE VISIT (OUTPATIENT)
Dept: INTERNAL MEDICINE | Facility: CLINIC | Age: 89
End: 2025-07-14
Payer: MEDICARE

## 2025-07-14 VITALS
BODY MASS INDEX: 25.36 KG/M2 | WEIGHT: 137.8 LBS | HEIGHT: 62 IN | HEART RATE: 76 BPM | SYSTOLIC BLOOD PRESSURE: 138 MMHG | DIASTOLIC BLOOD PRESSURE: 82 MMHG | OXYGEN SATURATION: 97 %

## 2025-07-14 DIAGNOSIS — Z00.00 MEDICARE ANNUAL WELLNESS VISIT, SUBSEQUENT: Primary | ICD-10-CM

## 2025-07-14 DIAGNOSIS — M54.41 CHRONIC BILATERAL LOW BACK PAIN WITH RIGHT-SIDED SCIATICA: Chronic | ICD-10-CM

## 2025-07-14 DIAGNOSIS — E03.9 ACQUIRED HYPOTHYROIDISM: Chronic | ICD-10-CM

## 2025-07-14 DIAGNOSIS — R73.09 ELEVATED GLUCOSE: ICD-10-CM

## 2025-07-14 DIAGNOSIS — I10 PRIMARY HYPERTENSION: Chronic | ICD-10-CM

## 2025-07-14 DIAGNOSIS — R30.0 DYSURIA: ICD-10-CM

## 2025-07-14 DIAGNOSIS — G89.29 CHRONIC BILATERAL LOW BACK PAIN WITH RIGHT-SIDED SCIATICA: Chronic | ICD-10-CM

## 2025-07-14 DIAGNOSIS — E55.9 VITAMIN D DEFICIENCY: Chronic | ICD-10-CM

## 2025-07-14 LAB
25(OH)D3+25(OH)D2 SERPL-MCNC: 72.3 NG/ML (ref 30–100)
ALBUMIN SERPL-MCNC: 4.3 G/DL (ref 3.5–5.2)
ALBUMIN/GLOB SERPL: 1.6 G/DL
ALP SERPL-CCNC: 81 U/L (ref 39–117)
ALT SERPL-CCNC: 11 U/L (ref 1–33)
AST SERPL-CCNC: 15 U/L (ref 1–32)
BILIRUB BLD-MCNC: NEGATIVE MG/DL
BILIRUB SERPL-MCNC: 0.3 MG/DL (ref 0–1.2)
BUN SERPL-MCNC: 15 MG/DL (ref 8–23)
BUN/CREAT SERPL: 21.4 (ref 7–25)
CALCIUM SERPL-MCNC: 9.5 MG/DL (ref 8.6–10.5)
CHLORIDE SERPL-SCNC: 104 MMOL/L (ref 98–107)
CHOLEST SERPL-MCNC: 145 MG/DL (ref 0–200)
CLARITY, POC: CLEAR
CO2 SERPL-SCNC: 28.6 MMOL/L (ref 22–29)
COLOR UR: YELLOW
CREAT SERPL-MCNC: 0.7 MG/DL (ref 0.57–1)
EGFRCR SERPLBLD CKD-EPI 2021: 82.8 ML/MIN/1.73
ERYTHROCYTE [DISTWIDTH] IN BLOOD BY AUTOMATED COUNT: 12 % (ref 12.3–15.4)
EXPIRATION DATE: NORMAL
GLOBULIN SER CALC-MCNC: 2.7 GM/DL
GLUCOSE SERPL-MCNC: 85 MG/DL (ref 65–99)
GLUCOSE UR STRIP-MCNC: NEGATIVE MG/DL
HBA1C MFR BLD: 5.7 % (ref 4.8–5.6)
HCT VFR BLD AUTO: 38.3 % (ref 34–46.6)
HDLC SERPL-MCNC: 49 MG/DL (ref 40–60)
HGB BLD-MCNC: 12.3 G/DL (ref 12–15.9)
KETONES UR QL: NEGATIVE
LDLC SERPL CALC-MCNC: 74 MG/DL (ref 0–100)
LEUKOCYTE EST, POC: NEGATIVE
Lab: NORMAL
MCH RBC QN AUTO: 32 PG (ref 26.6–33)
MCHC RBC AUTO-ENTMCNC: 32.1 G/DL (ref 31.5–35.7)
MCV RBC AUTO: 99.7 FL (ref 79–97)
NITRITE UR-MCNC: NEGATIVE MG/ML
PH UR: 5.5 [PH] (ref 5–8)
PLATELET # BLD AUTO: 255 10*3/MM3 (ref 140–450)
POTASSIUM SERPL-SCNC: 4.3 MMOL/L (ref 3.5–5.2)
PROT SERPL-MCNC: 7 G/DL (ref 6–8.5)
PROT UR STRIP-MCNC: NEGATIVE MG/DL
RBC # BLD AUTO: 3.84 10*6/MM3 (ref 3.77–5.28)
RBC # UR STRIP: NEGATIVE /UL
SODIUM SERPL-SCNC: 142 MMOL/L (ref 136–145)
SP GR UR: 1.02 (ref 1–1.03)
TRIGL SERPL-MCNC: 127 MG/DL (ref 0–150)
TSH SERPL DL<=0.005 MIU/L-ACNC: 0.47 UIU/ML (ref 0.27–4.2)
UROBILINOGEN UR QL: NORMAL
VLDLC SERPL CALC-MCNC: 22 MG/DL (ref 5–40)
WBC # BLD AUTO: 5.92 10*3/MM3 (ref 3.4–10.8)

## 2025-07-14 NOTE — PROGRESS NOTES
Subjective   The ABCs of the Annual Wellness Visit  Medicare Wellness Visit      Kajal Severino is a 89 y.o. patient who presents for a Medicare Wellness Visit.    The following portions of the patient's history were reviewed and   updated as appropriate: allergies, current medications, past family history, past medical history, past social history, past surgical history, and problem list.    Compared to one year ago, the patient's physical   health is the same.  Compared to one year ago, the patient's mental   health is the same.    Recent Hospitalizations:  She was not admitted to the hospital during the last year.     Current Medical Providers:  Patient Care Team:  India Alanis APRN as PCP - General (Internal Medicine)  Danny Borjas MD as Consulting Physician (Orthopedic Surgery)  Radha Scruggs MD as Consulting Physician (Hematology and Oncology)  Jemima Calabrese MD (Inactive) as Consulting Physician (Neurology)    Outpatient Medications Prior to Visit   Medication Sig Dispense Refill    acetaminophen (TYLENOL) 650 MG 8 hr tablet Take 1 tablet by mouth Every 8 (Eight) Hours As Needed for Mild Pain.      escitalopram (LEXAPRO) 10 MG tablet TAKE 1 AND 1/2 TABLETS BY MOUTH DAILY 135 tablet 0    fluticasone (FLONASE) 50 MCG/ACT nasal spray SHAKE LIQUID AND USE 2 SPRAYS IN EACH NOSTRIL DAILY 48 g 1    gabapentin (NEURONTIN) 300 MG capsule Take 1 capsule by mouth Every Night. 90 capsule 1    ibuprofen (ADVIL,MOTRIN) 200 MG tablet Take 1 tablet by mouth Every 6 (Six) Hours As Needed for Mild Pain.      levothyroxine (SYNTHROID, LEVOTHROID) 125 MCG tablet TAKE 1 TABLET BY MOUTH DAILY 90 tablet 1    loratadine (CLARITIN) 5 MG chewable tablet Chew 1 tablet Daily.      losartan (COZAAR) 100 MG tablet Take 1 tablet by mouth Daily. 90 tablet 1     No facility-administered medications prior to visit.     No opioid medication identified on active medication list. I have reviewed chart for other potential   "high risk medication/s and harmful drug interactions in the elderly.      Aspirin is not on active medication list.  Aspirin use is not indicated based on review of current medical condition/s. Risk of harm outweighs potential benefits.  .    Patient Active Problem List   Diagnosis    Hypertension    History of colonic polyps    Hypothyroidism    Allergic rhinitis    Vitamin D deficiency    Bee sting allergy    Monopolar depression    Breast calcification, left    Chronic bilateral low back pain with right-sided sciatica    Nonintractable headache    Prediabetes    Intervertebral disc stenosis of neural canal of lumbar region    Hiatal hernia    Toenail fungus    Hemorrhoids    Controlled substance agreement signed     Advance Care Planning Advance Directive is on file.  ACP discussion was held with the patient during this visit. Patient has an advance directive in EMR which is still valid.             Objective   Vitals:    07/14/25 1025   BP: 138/82   BP Location: Left arm   Patient Position: Sitting   Cuff Size: Adult   Pulse: 76   SpO2: 97%   Weight: 62.5 kg (137 lb 12.8 oz)   Height: 157.5 cm (62\")   PainSc: 8    PainLoc: Generalized  Comment: UTI pain       Estimated body mass index is 25.2 kg/m² as calculated from the following:    Height as of this encounter: 157.5 cm (62\").    Weight as of this encounter: 62.5 kg (137 lb 12.8 oz).                Does the patient have evidence of cognitive impairment? No  Lab Results   Component Value Date    CHLPL 145 07/14/2025    TRIG 127 07/14/2025    HDL 49 07/14/2025    LDL 74 07/14/2025    VLDL 22 07/14/2025    HGBA1C 5.70 (H) 07/14/2025                                                                                                Health  Risk Assessment    Smoking Status:  Social History     Tobacco Use   Smoking Status Former    Current packs/day: 0.00    Average packs/day: 0.3 packs/day for 19.9 years (5.2 ttl pk-yrs)    Types: Cigarettes    Start date: 1/1/1980    " Quit date: 1995    Years since quittin.5    Passive exposure: Past   Smokeless Tobacco Never     Alcohol Consumption:  Social History     Substance and Sexual Activity   Alcohol Use No       Fall Risk Screen  POPADI Fall Risk Assessment was completed, and patient is at HIGH risk for falls. Assessment completed on:2025    Depression Screening   Little interest or pleasure in doing things? Not at all   Feeling down, depressed, or hopeless? Several days   PHQ-2 Total Score 1      Health Habits and Functional and Cognitive Screenin/12/2025     8:24 PM   Functional & Cognitive Status   Do you have difficulty preparing food and eating? Yes   Do you have difficulty bathing yourself, getting dressed or grooming yourself? Yes   Do you have difficulty using the toilet? Yes   Do you have difficulty moving around from place to place? Yes   Do you have trouble with steps or getting out of a bed or a chair? Yes   Current Diet Other   Dental Exam Not up to date   Eye Exam Unknown   Exercise (times per week) 0 times per week   Current Exercises Include No Regular Exercise   Do you need help using the phone?  No   Are you deaf or do you have serious difficulty hearing?  No   Do you need help to go to places out of walking distance? Yes   Do you need help shopping? Yes   Do you need help preparing meals?  Yes   Do you need help with housework?  Yes   Do you need help with laundry? Yes   Do you need help taking your medications? Yes   Do you need help managing money? Yes   Do you ever drive or ride in a car without wearing a seat belt? No   Have you felt unusual fatigue (could be tiredness), stress, anger or loneliness in the last month? No   Who do you live with? Child   If you need help, do you have trouble finding someone available to you? No   Have you been bothered in the last four weeks by sexual problems? No   Do you have difficulty concentrating, remembering or making decisions? Yes            Age-appropriate Screening Schedule:  Refer to the list below for future screening recommendations based on patient's age, sex and/or medical conditions. Orders for these recommended tests are listed in the plan section. The patient has been provided with a written plan.    Health Maintenance List  Health Maintenance   Topic Date Due    RSV Vaccine - Adults (1 - 1-dose 75+ series) Never done    ZOSTER VACCINE (2 of 3) 05/13/2013    TDAP/TD VACCINES (2 - Td or Tdap) 03/18/2023    ANNUAL WELLNESS VISIT  02/13/2025    DXA SCAN  10/19/2025    COVID-19 Vaccine (4 - 2024-25 season) 11/05/2025 (Originally 9/1/2024)    INFLUENZA VACCINE  10/01/2025    Pneumococcal Vaccine 50+  Completed    MAMMOGRAM  Discontinued                                                                                                                                                CMS Preventative Services Quick Reference  Risk Factors Identified During Encounter  Immunizations Discussed/Encouraged: Tdap, Shingrix, and RSV (Respiratory Syncytial Virus)    The above risks/problems have been discussed with the patient.  Pertinent information has been shared with the patient in the After Visit Summary.  An After Visit Summary and PPPS were made available to the patient.    Follow Up:   Next Medicare Wellness visit to be scheduled in 1 year.         Additional E&M Note during same encounter follows:  Patient has additional, significant, and separately identifiable condition(s)/problem(s) that require work above and beyond the Medicare Wellness Visit     Chief Complaint  Medicare Wellness-subsequent, Urinary Tract Infection (Burning with urination ), and Back Pain (Render is no longer doing ablations )    Subjective    HPI  Kajal is also being seen today for additional medical problem/s.       The patient presents for evaluation of ankle pain, urinary burning, back pain, blood pressure management, and headaches.    Left ankle pain gradually improving,  "has seen ortho for mgmt of fracture of left lateral malleolus. Using a compression sleeve and cane.     Intermittent urinary burning for a year, maintains good hydration. Reports vaginal itching.    Back pain managed with Tylenol, ibuprofen, and gabapentin. Ablation with Dr. Sullivan canceled, awaiting consultation with Dr. Sloan at [facility].    Daily losartan for blood pressure control. Occasional sleep disturbances, two episodes of dizziness when transitioning from sitting to standing. Elevated blood pressure during episodes, no blurred vision or ankle swelling.    Constant headaches, long-standing issue. Wakes up with headaches, takes ibuprofen at night for relief.  Review of Systems   HENT:  Positive for congestion and postnasal drip.    Respiratory:  Negative for cough, chest tightness and shortness of breath.    Cardiovascular:  Negative for chest pain, palpitations and leg swelling.   Gastrointestinal:  Negative for abdominal pain.   Musculoskeletal:  Positive for arthralgias and back pain.   Neurological:  Positive for dizziness and light-headedness.          Objective   Vital Signs:  /82 (BP Location: Left arm, Patient Position: Sitting, Cuff Size: Adult)   Pulse 76   Ht 157.5 cm (62\")   Wt 62.5 kg (137 lb 12.8 oz)   SpO2 97%   BMI 25.20 kg/m²   Physical Exam  Constitutional:       Appearance: She is well-developed. She is not ill-appearing.   HENT:      Head: Normocephalic.      Right Ear: Hearing, tympanic membrane and external ear normal.      Left Ear: Hearing, tympanic membrane and external ear normal.      Nose: Nose normal. No nasal deformity, mucosal edema or rhinorrhea.      Right Sinus: No maxillary sinus tenderness or frontal sinus tenderness.      Left Sinus: No maxillary sinus tenderness or frontal sinus tenderness.      Mouth/Throat:      Dentition: Normal dentition.   Eyes:      General: Lids are normal.         Right eye: No discharge.         Left eye: No discharge.      " Conjunctiva/sclera: Conjunctivae normal.      Right eye: No exudate.     Left eye: No exudate.  Neck:      Thyroid: No thyroid mass or thyromegaly.      Vascular: No carotid bruit.      Trachea: Trachea normal.   Cardiovascular:      Rate and Rhythm: Regular rhythm.      Pulses: Normal pulses.      Heart sounds: Normal heart sounds. No murmur heard.  Pulmonary:      Effort: No respiratory distress.      Breath sounds: Normal breath sounds. No decreased breath sounds, wheezing, rhonchi or rales.   Abdominal:      General: Bowel sounds are normal.      Palpations: Abdomen is soft.      Tenderness: There is no abdominal tenderness.   Musculoskeletal:      Cervical back: Normal range of motion. No edema.      Comments: Compression sleeve left ankle  Walking with rollator   Lymphadenopathy:      Head:      Right side of head: No submental, submandibular, tonsillar, preauricular, posterior auricular or occipital adenopathy.      Left side of head: No submental, submandibular, tonsillar, preauricular, posterior auricular or occipital adenopathy.   Skin:     General: Skin is warm and dry.      Nails: There is no clubbing.   Neurological:      Mental Status: She is alert.   Psychiatric:         Behavior: Behavior is cooperative.               The following data was reviewed by: HAILY Pisano on 07/14/2025:  Data reviewed : Consultant notes ortho 7/10/25  Common labs          11/8/2024    11:42 7/14/2025    11:34   Common Labs   Glucose 70  85    BUN 20  15.0    Creatinine 0.67  0.70    Sodium 140  142    Potassium 4.6  4.3    Chloride 103  104    Calcium 9.6  9.5    Albumin 4.2  4.3    Total Bilirubin 0.3  0.3    Alkaline Phosphatase 71  81    AST (SGOT) 18  15    ALT (SGPT) 14  11    WBC  5.92    Hemoglobin  12.3    Hematocrit  38.3    Platelets  255    Total Cholesterol  145    Triglycerides  127    HDL Cholesterol  49    LDL Cholesterol   74    Hemoglobin A1C  5.70        Results             Assessment and Plan  Additional age appropriate preventative wellness advice topics were discussed during today's preventative wellness exam(some topics already addressed during AWV portion of the note above):    Physical Activity: Advised cardiovascular activity 150 minutes per week as tolerated. (example brisk walk for 30 minutes, 5 days a week).     Nutrition: Discussed nutrition plan with patient. Information shared in after visit summary. Goal is for a well balanced diet to enhance overall health.         Health maintenance.  Advised to receive Shingrix and RSV vaccines at pharmacy. Influenza vaccine in fall.     Diagnoses and all orders for this visit:    1. Medicare annual wellness visit, subsequent (Primary)    2. Primary hypertension  Assessment & Plan:  BP mildly elevated in office today, continue losartan along with regular monitoring.    Orders:  -     CBC (No Diff)  -     Comprehensive Metabolic Panel  -     TSH    3. Dysuria  Comments:  Intermittent burning for a year. Collect urine sample today.  Orders:  -     POC Urinalysis Dipstick, Automated    4. Acquired hypothyroidism  Assessment & Plan:  She is currently managed on Synthroid 125 mcg daily for replacement, recheck TSH.    Orders:  -     Lipid Panel    5. Vitamin D deficiency  Assessment & Plan:  She is currently taking Vitamin D daily, recheck level.    Orders:  -     Vitamin D,25-Hydroxy    6. Elevated glucose  Comments:  Recheck glucose and A1c  Orders:  -     Hemoglobin A1c    7. Chronic bilateral low back pain with right-sided sciatica  Assessment & Plan:  She has a history of chronic low back pain, takes gabapentin daily and working with pain mgmt for possible ablation.             Follow Up   Return in about 4 months (around 11/14/2025).  Patient was given instructions and counseling regarding her condition or for health maintenance advice. Please see specific information pulled into the AVS if appropriate.  Patient or patient representative verbalized  consent for the use of Ambient Listening during the visit with  HAILY Pisano for chart documentation. 7/19/2025  09:12 EDT

## 2025-07-19 NOTE — PATIENT INSTRUCTIONS
Medicare Wellness  Personal Prevention Plan of Service     Date of Office Visit:    Encounter Provider:  HAILY Pisano  Place of Service:  [facility]  Patient Name: Kajal Severino  :  1935    As part of the Medicare Wellness portion of your visit today, we are providing you with this personalized preventive plan of services (PPPS). This plan is based upon recommendations of the [place] Preventive Services Task Force (USPSTF) and the Advisory Committee on Immunization Practices (ACIP).    This lists the preventive care services that should be considered, and provides dates of when you are due. Items listed as completed are up-to-date and do not require any further intervention.    Health Maintenance   Topic Date Due    RSV Vaccine - Adults (1 - 1-dose 75+ series) Never done    ZOSTER VACCINE (2 of 3) 2013    TDAP/TD VACCINES (2 - Td or Tdap) 2023    ANNUAL WELLNESS VISIT  2025    DXA SCAN  10/19/2025    COVID-19 Vaccine (2024- season) 2025 (Originally 2024)    INFLUENZA VACCINE  10/01/2025    Pneumococcal Vaccine 50+  Completed    MAMMOGRAM  Discontinued       Orders Placed This Encounter   Procedures    CBC (No Diff)     Release to patient:   Routine Release [[id number]]     [facility] Has the patient fasted?:   No    Comprehensive Metabolic Panel     Release to patient:   Routine Release [[id number]]     [facility] Has the patient fasted?:   No    Lipid Panel     Release to patient:   Routine Release [[id number]]     [facility] Has the patient fasted?:   No    TSH     Release to patient:   Routine Release [[id number]]     [facility] Has the patient fasted?:   No    Hemoglobin A1c     Release to patient:   Routine Release [[id number]]     [facility] Has the patient fasted?:   No    Vitamin D,25-Hydroxy     Release to patient:   Routine Release [[id number]]     [facility] Has the patient fasted?:   No    POC Urinalysis Dipstick, Automated      Release to patient:   Routine Release [[id number]]       Return in about 4 months (around 11/14/2025).

## 2025-08-01 DIAGNOSIS — I10 ESSENTIAL HYPERTENSION: ICD-10-CM

## 2025-08-04 RX ORDER — LOSARTAN POTASSIUM 100 MG/1
100 TABLET ORAL DAILY
Qty: 90 TABLET | Refills: 1 | Status: SHIPPED | OUTPATIENT
Start: 2025-08-04

## 2025-08-07 DIAGNOSIS — F33.9 MONOPOLAR DEPRESSION: Chronic | ICD-10-CM

## 2025-08-07 RX ORDER — ESCITALOPRAM OXALATE 10 MG/1
15 TABLET ORAL DAILY
Qty: 135 TABLET | Refills: 0 | Status: SHIPPED | OUTPATIENT
Start: 2025-08-07

## 2025-08-11 ENCOUNTER — TELEPHONE (OUTPATIENT)
Dept: PAIN MEDICINE | Facility: CLINIC | Age: 89
End: 2025-08-11
Payer: MEDICARE

## 2025-08-12 ENCOUNTER — OFFICE VISIT (OUTPATIENT)
Dept: PAIN MEDICINE | Facility: CLINIC | Age: 89
End: 2025-08-12
Payer: MEDICARE

## 2025-08-12 ENCOUNTER — PREP FOR SURGERY (OUTPATIENT)
Dept: SURGERY | Facility: SURGERY CENTER | Age: 89
End: 2025-08-12
Payer: MEDICARE

## 2025-08-12 VITALS
BODY MASS INDEX: 24.84 KG/M2 | HEART RATE: 90 BPM | TEMPERATURE: 97.1 F | OXYGEN SATURATION: 96 % | WEIGHT: 135 LBS | HEIGHT: 62 IN | SYSTOLIC BLOOD PRESSURE: 142 MMHG | DIASTOLIC BLOOD PRESSURE: 90 MMHG

## 2025-08-12 DIAGNOSIS — M99.53 INTERVERTEBRAL DISC STENOSIS OF NEURAL CANAL OF LUMBAR REGION: ICD-10-CM

## 2025-08-12 DIAGNOSIS — M47.816 LUMBAR FACET ARTHROPATHY: Primary | ICD-10-CM

## 2025-08-12 PROCEDURE — 1160F RVW MEDS BY RX/DR IN RCRD: CPT | Performed by: PHYSICIAN ASSISTANT

## 2025-08-12 PROCEDURE — 1126F AMNT PAIN NOTED NONE PRSNT: CPT | Performed by: PHYSICIAN ASSISTANT

## 2025-08-12 PROCEDURE — 99204 OFFICE O/P NEW MOD 45 MIN: CPT | Performed by: PHYSICIAN ASSISTANT

## 2025-08-12 PROCEDURE — 1159F MED LIST DOCD IN RCRD: CPT | Performed by: PHYSICIAN ASSISTANT

## 2025-08-14 ENCOUNTER — TRANSCRIBE ORDERS (OUTPATIENT)
Dept: SURGERY | Facility: SURGERY CENTER | Age: 89
End: 2025-08-14
Payer: MEDICARE

## 2025-08-14 DIAGNOSIS — Z41.9 SURGERY, ELECTIVE: Primary | ICD-10-CM

## 2025-08-22 DIAGNOSIS — R52 DIFFUSE PAIN: ICD-10-CM

## 2025-08-25 DIAGNOSIS — R52 DIFFUSE PAIN: ICD-10-CM

## 2025-08-25 RX ORDER — GABAPENTIN 300 MG/1
300 CAPSULE ORAL NIGHTLY
Qty: 90 CAPSULE | OUTPATIENT
Start: 2025-08-25

## 2025-08-28 RX ORDER — GABAPENTIN 300 MG/1
300 CAPSULE ORAL NIGHTLY
Qty: 90 CAPSULE | Refills: 1 | Status: SHIPPED | OUTPATIENT
Start: 2025-08-28